# Patient Record
Sex: FEMALE | Race: WHITE | Employment: FULL TIME | URBAN - NONMETROPOLITAN AREA
[De-identification: names, ages, dates, MRNs, and addresses within clinical notes are randomized per-mention and may not be internally consistent; named-entity substitution may affect disease eponyms.]

---
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
## 2018-09-16 ENCOUNTER — HOSPITAL ENCOUNTER (EMERGENCY)
Facility: HOSPITAL | Age: 18
Discharge: HOME | End: 2018-09-16
Admitting: EMERGENCY MEDICINE

## 2018-09-16 VITALS
BODY MASS INDEX: 32.1 KG/M2 | HEIGHT: 64 IN | SYSTOLIC BLOOD PRESSURE: 126 MMHG | TEMPERATURE: 98.9 F | WEIGHT: 188 LBS | DIASTOLIC BLOOD PRESSURE: 93 MMHG | OXYGEN SATURATION: 99 % | HEART RATE: 82 BPM | RESPIRATION RATE: 18 BRPM

## 2018-09-16 DIAGNOSIS — H60.392 OTHER INFECTIVE ACUTE OTITIS EXTERNA OF LEFT EAR: Primary | ICD-10-CM

## 2018-09-16 PROCEDURE — 99283 EMERGENCY DEPT VISIT LOW MDM: CPT

## 2018-09-16 RX ORDER — IBUPROFEN 800 MG/1
800 TABLET ORAL ONCE
Status: COMPLETED | OUTPATIENT
Start: 2018-09-16 | End: 2018-09-16

## 2018-09-16 RX ORDER — SULFAMETHOXAZOLE AND TRIMETHOPRIM 800; 160 MG/1; MG/1
1 TABLET ORAL 2 TIMES DAILY
Qty: 20 TABLET | Refills: 0 | Status: SHIPPED | OUTPATIENT
Start: 2018-09-16 | End: 2018-09-26

## 2018-09-16 RX ORDER — TRAMADOL HYDROCHLORIDE 50 MG/1
50 TABLET ORAL ONCE
Status: COMPLETED | OUTPATIENT
Start: 2018-09-16 | End: 2018-09-16

## 2018-09-16 RX ORDER — TRAMADOL HYDROCHLORIDE 50 MG/1
50 TABLET ORAL EVERY 8 HOURS PRN
Qty: 4 TABLET | Refills: 0 | Status: SHIPPED | OUTPATIENT
Start: 2018-09-16 | End: 2022-10-12 | Stop reason: SDUPTHER

## 2018-09-16 RX ORDER — SERTRALINE HYDROCHLORIDE 100 MG/1
100 TABLET, FILM COATED ORAL DAILY
COMMUNITY
End: 2022-12-29

## 2018-09-16 RX ORDER — CIPROFLOXACIN AND DEXAMETHASONE 3; 1 MG/ML; MG/ML
4 SUSPENSION/ DROPS AURICULAR (OTIC) 2 TIMES DAILY
Status: DISCONTINUED | OUTPATIENT
Start: 2018-09-16 | End: 2018-09-16 | Stop reason: HOSPADM

## 2018-09-16 RX ORDER — IBUPROFEN 800 MG/1
800 TABLET ORAL EVERY 8 HOURS PRN
Qty: 30 TABLET | Refills: 0 | Status: SHIPPED | OUTPATIENT
Start: 2018-09-16 | End: 2022-12-29

## 2018-09-16 RX ADMIN — IBUPROFEN 800 MG: 800 TABLET ORAL at 13:12

## 2018-09-16 RX ADMIN — TRAMADOL HYDROCHLORIDE 50 MG: 50 TABLET, COATED ORAL at 13:12

## 2018-09-16 RX ADMIN — CIPROFLOXACIN AND DEXAMETHASONE 4 DROP: 3; 1 SUSPENSION/ DROPS AURICULAR (OTIC) at 13:15

## 2018-09-16 NOTE — DISCHARGE INSTRUCTIONS
Continue to monitor the area, he should see improvement within the next 2448 hrs. after starting antibiotics.  If you continue to have symptoms or if there is a worsening of symptoms follow-up sooner with primary care provider.  Increase your fluid intake, take ibuprofen with food, you may use Ultram as needed for breakthrough pain at nighttime

## 2018-09-16 NOTE — ED PROVIDER NOTES
"Subjective   Mrs. Lloyd is a 18-year-old female patient who presents today for complaints of an acute on set of left ear pain and appearance of lymph nodes over this past week.  Patient states that she has recently got over a dental infection of the left lower molars, having taken amoxicillin.  She states that she began having some swelling to a ear piercing located just outside of the ear canal approximately 2 weeks ago.  She states that she has removed the jewelry.  She noticed that she had a \"knot\" behind her ear last week and saw her primary care provider.  She also had upper risk for infections that she was placed on Zithromax at that time.  She subsequently underwent ultrasound of the area and it was believed to be reactive lymph node.  She states that over this weekend the pain of her left ear has increased significantly in the ear canal itself is now swollen.  She also notices several other not she believes are lymph nodes as well around the left ear.  She denies any known fevers/chills, nausea/vomiting, sore throat and nasal congestion difficulty swallowing.  Patient states that her primary care provider did not start her on antibiotics last week because she feels that her prior amoxicillin and Zithromax would have cleared her from any skin or ear infection.        History provided by:  Patient   used: No        Review of Systems   Constitutional: Negative for chills and fever.   HENT: Positive for ear pain. Negative for congestion, rhinorrhea, sore throat and trouble swallowing.    Eyes: Negative.  Negative for visual disturbance.   Respiratory: Negative.  Negative for cough, chest tightness and shortness of breath.    Cardiovascular: Negative.  Negative for chest pain and palpitations.   Gastrointestinal: Negative for abdominal pain, diarrhea, nausea and vomiting.   Endocrine: Negative.    Genitourinary: Negative for decreased urine volume.   Musculoskeletal: Negative for back pain " "and neck stiffness.   Skin: Negative for wound.   Allergic/Immunologic: Negative.    Neurological: Negative.  Negative for dizziness, weakness and headaches.   Hematological: Negative.    Psychiatric/Behavioral: Negative.        Past Medical History:   Diagnosis Date   • Abdominal pain     r/t gallbladder   • Acid reflux    • Asthma    • Migraines    • Nausea    • Sinusitis        Allergies   Allergen Reactions   • Hydrocodone Itching       Past Surgical History:   Procedure Laterality Date   • CHOLECYSTECTOMY     • CHOLECYSTECTOMY WITH INTRAOPERATIVE CHOLANGIOGRAM N/A 1/20/2017    Procedure: CHOLECYSTECTOMY, LAPAROSCOPIC;  Surgeon: Lo De La Torre MD;  Location: [facility];  Service:        History reviewed. No pertinent family history.    Social History     Social History   • Marital status: Single     Social History Main Topics   • Smoking status: Never Smoker   • Smokeless tobacco: Never Used   • Alcohol use No   • Drug use: No   • Sexual activity: No     Other Topics Concern   • Not on file       /93 (BP Location: Right arm, Patient Position: Sitting)   Pulse 82   Temp 98.9 °F (37.2 °C) (Temporal Artery )   Resp 18   Ht 162.6 cm (64\")   Wt 85.3 kg (188 lb)   SpO2 99%   BMI 32.27 kg/m²       Objective   Physical Exam   Constitutional: She is oriented to person, place, and time. She appears well-developed and well-nourished. No distress.   HENT:   Head: Normocephalic and atraumatic.   Right Ear: External ear normal.   Left Ear: External ear normal. There is tenderness. No drainage. No foreign bodies.   Nose: Nose normal.   Mouth/Throat: Oropharynx is clear and moist.   There is moderate canal edema noted as well as swelling and some redness to the area of piercing just outside of the ear canal.  There is tenderness around this area as well.  They are are pre-and postauricular nodes that are tender to palpation.  There is also some tender left cervical nodes as well   Eyes: Pupils are equal, round, and " reactive to light. EOM are normal.   Neck: Normal range of motion. Neck supple.   Cardiovascular: Normal rate and regular rhythm.    Pulmonary/Chest: Effort normal and breath sounds normal.   Abdominal: Soft. Bowel sounds are normal. There is no tenderness. There is no rebound and no guarding.   Musculoskeletal: Normal range of motion.   Neurological: She is alert and oriented to person, place, and time.   Skin: Skin is warm and dry.   Psychiatric: She has a normal mood and affect.   Nursing note and vitals reviewed.      Procedures           ED Course  ED Course as of Sep 16 1422   Sun Sep 16, 2018   1300 Discussed that I believe that the piercing has caused a infection type reaction to the ear canal.  I recommend that we start a antibiotic it is more tailored to skin and soft tissue infection as well as eardrops for external otitis media.  Patient mother are in agreement.  We will go ahead and start eardrops here and have patient follow up with her primary care provider for reevaluation.  For any new or worsening symptoms May return to emergency room.  [BA]      ED Course User Index  [BA] Moriah Helms, KRISTEN                  [facility]      Final diagnoses:   Other infective acute otitis externa of left ear            Moriah Helms, KRISTEN  09/16/18 142

## 2018-11-05 ENCOUNTER — OFFICE VISIT (OUTPATIENT)
Dept: PRIMARY CARE CLINIC | Age: 18
End: 2018-11-05
Payer: COMMERCIAL

## 2018-11-05 VITALS
TEMPERATURE: 97.5 F | OXYGEN SATURATION: 98 % | DIASTOLIC BLOOD PRESSURE: 82 MMHG | SYSTOLIC BLOOD PRESSURE: 123 MMHG | BODY MASS INDEX: 31.86 KG/M2 | HEIGHT: 64 IN | RESPIRATION RATE: 18 BRPM | HEART RATE: 84 BPM | WEIGHT: 186.6 LBS

## 2018-11-05 DIAGNOSIS — N93.8 DUB (DYSFUNCTIONAL UTERINE BLEEDING): ICD-10-CM

## 2018-11-05 DIAGNOSIS — H92.01 RIGHT EAR PAIN: Primary | ICD-10-CM

## 2018-11-05 DIAGNOSIS — J45.901 MODERATE ASTHMA WITH EXACERBATION, UNSPECIFIED WHETHER PERSISTENT: ICD-10-CM

## 2018-11-05 DIAGNOSIS — F51.04 CHRONIC INSOMNIA: ICD-10-CM

## 2018-11-05 DIAGNOSIS — F41.8 DEPRESSION WITH ANXIETY: ICD-10-CM

## 2018-11-05 PROCEDURE — G8417 CALC BMI ABV UP PARAM F/U: HCPCS | Performed by: FAMILY MEDICINE

## 2018-11-05 PROCEDURE — 1036F TOBACCO NON-USER: CPT | Performed by: FAMILY MEDICINE

## 2018-11-05 PROCEDURE — 99214 OFFICE O/P EST MOD 30 MIN: CPT | Performed by: FAMILY MEDICINE

## 2018-11-05 PROCEDURE — G8484 FLU IMMUNIZE NO ADMIN: HCPCS | Performed by: FAMILY MEDICINE

## 2018-11-05 PROCEDURE — G8427 DOCREV CUR MEDS BY ELIG CLIN: HCPCS | Performed by: FAMILY MEDICINE

## 2018-11-05 RX ORDER — PREDNISONE 20 MG/1
TABLET ORAL
Qty: 7 TABLET | Refills: 0 | Status: SHIPPED | OUTPATIENT
Start: 2018-11-05 | End: 2019-01-04

## 2018-11-05 ASSESSMENT — PATIENT HEALTH QUESTIONNAIRE - PHQ9
SUM OF ALL RESPONSES TO PHQ QUESTIONS 1-9: 1
2. FEELING DOWN, DEPRESSED OR HOPELESS: 1
SUM OF ALL RESPONSES TO PHQ9 QUESTIONS 1 & 2: 1
SUM OF ALL RESPONSES TO PHQ QUESTIONS 1-9: 1
1. LITTLE INTEREST OR PLEASURE IN DOING THINGS: 0

## 2018-11-05 NOTE — PATIENT INSTRUCTIONS
in a peaceful, pleasant scene. Focus on the details and feelings of being in a place that is relaxing. · Get up and do a quiet or boring activity until you feel sleepy. · Don't drink any liquids after 6 p.m. if you wake up often because you have to go to the bathroom. Where can you learn more? Go to [email or web address] org and sign in to your [email or web address]. account. Enter Y031 in the Contract Cloud box to learn more about \"Learning About Sleeping Well. \"     If you do not have an account, please click on the \"Sign Up Now\" link. Current as of: December 7, 2017  Content Version: 11.7  © 8554-7655 [employer]. Care instructions adapted under license by [facility] ([facility]). If you have questions about a medical condition or this instruction, always ask your healthcare professional. [address] any warranty or liability for your use of this information.

## 2018-11-06 ENCOUNTER — NURSE ONLY (OUTPATIENT)
Dept: PRIMARY CARE CLINIC | Age: 18
End: 2018-11-06
Payer: COMMERCIAL

## 2018-11-06 DIAGNOSIS — N92.6 IRREGULAR MENSES: Primary | ICD-10-CM

## 2018-11-06 DIAGNOSIS — G43.009 MIGRAINE WITHOUT AURA AND WITHOUT STATUS MIGRAINOSUS, NOT INTRACTABLE: ICD-10-CM

## 2018-11-06 LAB
ALBUMIN SERPL-MCNC: 4.1 G/DL (ref 3.5–5.2)
ALP BLD-CCNC: 83 U/L (ref 35–104)
ALT SERPL-CCNC: 21 U/L (ref 5–33)
ANION GAP SERPL CALCULATED.3IONS-SCNC: 17 MMOL/L (ref 7–19)
AST SERPL-CCNC: 19 U/L (ref 5–32)
BILIRUB SERPL-MCNC: 0.3 MG/DL (ref 0.2–1.2)
BUN BLDV-MCNC: 8 MG/DL (ref 6–20)
CALCIUM SERPL-MCNC: 9.9 MG/DL (ref 8.6–10)
CHLORIDE BLD-SCNC: 104 MMOL/L (ref 98–111)
CO2: 20 MMOL/L (ref 22–29)
CREAT SERPL-MCNC: 0.6 MG/DL (ref 0.5–0.9)
GFR NON-AFRICAN AMERICAN: >60
GLUCOSE BLD-MCNC: 104 MG/DL (ref 74–109)
HCT VFR BLD CALC: 45.5 % (ref 37–47)
HEMOGLOBIN: 14.5 G/DL (ref 12–16)
MCH RBC QN AUTO: 30.7 PG (ref 27–31)
MCHC RBC AUTO-ENTMCNC: 31.9 G/DL (ref 33–37)
MCV RBC AUTO: 96.4 FL (ref 81–99)
PDW BLD-RTO: 12.7 % (ref 11.5–14.5)
PLATELET # BLD: 317 K/UL (ref 130–400)
PMV BLD AUTO: 9.3 FL (ref 9.4–12.3)
POTASSIUM SERPL-SCNC: 4.2 MMOL/L (ref 3.5–5)
RBC # BLD: 4.72 M/UL (ref 4.2–5.4)
SODIUM BLD-SCNC: 141 MMOL/L (ref 136–145)
TOTAL PROTEIN: 7.4 G/DL (ref 6.6–8.7)
TSH SERPL DL<=0.05 MIU/L-ACNC: 0.74 UIU/ML (ref 0.27–4.2)
WBC # BLD: 11.6 K/UL (ref 4.8–10.8)

## 2018-11-06 PROCEDURE — 36415 COLL VENOUS BLD VENIPUNCTURE: CPT | Performed by: NURSE PRACTITIONER

## 2018-11-18 ASSESSMENT — ENCOUNTER SYMPTOMS
TROUBLE SWALLOWING: 0
SINUS PAIN: 0
RESPIRATORY NEGATIVE: 1
GASTROINTESTINAL NEGATIVE: 1
RHINORRHEA: 0
SINUS PRESSURE: 0

## 2018-11-19 ENCOUNTER — TELEPHONE (OUTPATIENT)
Dept: PRIMARY CARE CLINIC | Age: 18
End: 2018-11-19

## 2018-11-19 NOTE — PROGRESS NOTES
behavior is normal. Judgment and thought content normal. Cognition and memory are normal.   Vitals reviewed. Assessment:      1. Right ear pain    2. DUB (dysfunctional uterine bleeding)    3. Moderate asthma with exacerbation, unspecified whether persistent    4. Depression with anxiety    5. Chronic insomnia            Plan:      MEDICATIONS:  Orders Placed This Encounter   Medications    predniSONE (DELTASONE) 20 MG tablet     Si tablet by mouth once a day for 7 days     Dispense:  7 tablet     Refill:  0   She has albuterol nebulizer solution at home. ORDERS:  No orders of the defined types were placed in this encounter. I feel that she is having a mild asthma flareup. We will treat with prednisone. I feel like she needs to see a counselor. I would like her to be in counseling before we start more medication. At this time I do not see anything in her ear. I really don't detect any enlarged lymph nodes. She could've had a virus. If these things recur she is to let me know. we talked about ways to improve her sleep. I am not a fan of sleeping pills in young people. We could consider Remeron though. Follow-up:  Return in about 5 weeks (around 12/10/2018) for recheck. PATIENT INSTRUCTIONS:  Patient Instructions       Patient Education        Asthma Attack: Care Instructions  Your Care Instructions    During an asthma attack, the airways swell and narrow. This makes it hard to breathe. Severe asthma attacks can be life-threatening, but you can help prevent them by keeping your asthma under control and treating symptoms before they get bad. Symptoms include being short of breath, having chest tightness, coughing, and wheezing. Noting and treating these symptoms can also help you avoid future trips to the emergency room. The doctor has checked you carefully, but problems can develop later. If you notice any problems or new symptoms, get medical treatment right away.   Follow-up care is a key part of your treatment and safety. Be sure to make and go to all appointments, and call your doctor if you are having problems. It's also a good idea to know your test results and keep a list of the medicines you take. How can you care for yourself at home? · Follow your asthma action plan to prevent and treat attacks. If you don't have an asthma action plan, work with your doctor to create one. · Take your asthma medicines exactly as prescribed. Talk to your doctor right away if you have any questions about how to take them. ¨ Use your quick-relief medicine when you have symptoms of an attack. Quick-relief medicine is usually an albuterol inhaler. Some people need to use quick-relief medicine before they exercise. ¨ Take your controller medicine every day, not just when you have symptoms. Controller medicine is usually an inhaled corticosteroid. The goal is to prevent problems before they occur. Don't use your controller medicine to treat an attack that has already started. It doesn't work fast enough to help. ¨ If your doctor prescribed corticosteroid pills to use during an attack, take them exactly as prescribed. It may take hours for the pills to work, but they may make the episode shorter and help you breathe better. ¨ Keep your quick-relief medicine with you at all times. · Talk to your doctor before using other medicines. Some medicines, such as aspirin, can cause asthma attacks in some people. · If you have a peak flow meter, use it to check how well you are breathing. This can help you predict when an asthma attack is going to occur. Then you can take medicine to prevent the asthma attack or make it less severe. · Do not smoke or allow others to smoke around you. Avoid smoky places. Smoking makes asthma worse. If you need help quitting, talk to your doctor about stop-smoking programs and medicines. These can increase your chances of quitting for good.   · Learn what triggers an asthma attack

## 2018-11-19 NOTE — TELEPHONE ENCOUNTER
----- Message from Joelle Valencia MD sent at 11/18/2018 11:09 PM CST -----  Please put this in a phone note. Call mother. I'm afraid that Luis is going to fall through the cracks. Who is she seeing for counseling? I was afraid with her insurance the only people that she could see would be [facility]. I started her on prednisone for her wheezing. Please ask how that is doing. As far as I can tell she is not on any medicine for anxiety and depression.

## 2018-11-26 NOTE — TELEPHONE ENCOUNTER
PT has not had time to go to counseling. States that she is going to call today. PT also states that her wheezing is better and correct she is not taking any medication for the depression and anxiety.

## 2019-01-04 ENCOUNTER — OFFICE VISIT (OUTPATIENT)
Dept: PRIMARY CARE CLINIC | Age: 19
End: 2019-01-04
Payer: COMMERCIAL

## 2019-01-04 VITALS
WEIGHT: 182 LBS | HEIGHT: 64 IN | RESPIRATION RATE: 16 BRPM | HEART RATE: 80 BPM | SYSTOLIC BLOOD PRESSURE: 116 MMHG | DIASTOLIC BLOOD PRESSURE: 86 MMHG | BODY MASS INDEX: 31.07 KG/M2 | TEMPERATURE: 97.3 F

## 2019-01-04 DIAGNOSIS — F41.8 DEPRESSION WITH ANXIETY: Primary | ICD-10-CM

## 2019-01-04 DIAGNOSIS — N94.6 DYSMENORRHEA: ICD-10-CM

## 2019-01-04 PROCEDURE — G8417 CALC BMI ABV UP PARAM F/U: HCPCS | Performed by: FAMILY MEDICINE

## 2019-01-04 PROCEDURE — 1036F TOBACCO NON-USER: CPT | Performed by: FAMILY MEDICINE

## 2019-01-04 PROCEDURE — G8427 DOCREV CUR MEDS BY ELIG CLIN: HCPCS | Performed by: FAMILY MEDICINE

## 2019-01-04 PROCEDURE — 99213 OFFICE O/P EST LOW 20 MIN: CPT | Performed by: FAMILY MEDICINE

## 2019-01-04 PROCEDURE — G8484 FLU IMMUNIZE NO ADMIN: HCPCS | Performed by: FAMILY MEDICINE

## 2019-01-04 RX ORDER — VENLAFAXINE HYDROCHLORIDE 75 MG/1
CAPSULE, EXTENDED RELEASE ORAL
Qty: 60 CAPSULE | Refills: 3 | Status: SHIPPED | OUTPATIENT
Start: 2019-01-04 | End: 2019-10-11

## 2019-01-04 RX ORDER — SUMATRIPTAN 50 MG/1
TABLET, FILM COATED ORAL
Qty: 9 TABLET | Refills: 2 | Status: SHIPPED | OUTPATIENT
Start: 2019-01-04 | End: 2021-09-29

## 2019-01-04 RX ORDER — NORGESTIMATE AND ETHINYL ESTRADIOL 0.25-0.035
KIT ORAL
Qty: 3 PACKET | Refills: 3 | Status: SHIPPED | OUTPATIENT
Start: 2019-01-04 | End: 2019-10-11

## 2019-01-05 ASSESSMENT — ENCOUNTER SYMPTOMS: RESPIRATORY NEGATIVE: 1

## 2019-01-10 ENCOUNTER — TELEPHONE (OUTPATIENT)
Dept: PRIMARY CARE CLINIC | Age: 19
End: 2019-01-10

## 2019-01-11 RX ORDER — ESCITALOPRAM OXALATE 10 MG/1
10 TABLET ORAL DAILY
Qty: 30 TABLET | Refills: 3 | Status: SHIPPED | OUTPATIENT
Start: 2019-01-11 | End: 2021-09-29

## 2019-02-27 ENCOUNTER — OFFICE VISIT (OUTPATIENT)
Dept: PRIMARY CARE CLINIC | Age: 19
End: 2019-02-27
Payer: COMMERCIAL

## 2019-02-27 VITALS
HEART RATE: 82 BPM | SYSTOLIC BLOOD PRESSURE: 100 MMHG | BODY MASS INDEX: 34.19 KG/M2 | DIASTOLIC BLOOD PRESSURE: 80 MMHG | WEIGHT: 185.8 LBS | OXYGEN SATURATION: 99 % | RESPIRATION RATE: 18 BRPM | HEIGHT: 62 IN | TEMPERATURE: 97.3 F

## 2019-02-27 DIAGNOSIS — K29.00 ACUTE GASTRITIS WITHOUT HEMORRHAGE, UNSPECIFIED GASTRITIS TYPE: ICD-10-CM

## 2019-02-27 DIAGNOSIS — F33.42 RECURRENT MAJOR DEPRESSIVE DISORDER, IN FULL REMISSION (HCC): ICD-10-CM

## 2019-02-27 DIAGNOSIS — J02.9 PHARYNGITIS, UNSPECIFIED ETIOLOGY: Primary | ICD-10-CM

## 2019-02-27 LAB — S PYO AG THROAT QL: NORMAL

## 2019-02-27 PROCEDURE — 87880 STREP A ASSAY W/OPTIC: CPT | Performed by: FAMILY MEDICINE

## 2019-02-27 PROCEDURE — 1036F TOBACCO NON-USER: CPT | Performed by: FAMILY MEDICINE

## 2019-02-27 PROCEDURE — G8427 DOCREV CUR MEDS BY ELIG CLIN: HCPCS | Performed by: FAMILY MEDICINE

## 2019-02-27 PROCEDURE — G8484 FLU IMMUNIZE NO ADMIN: HCPCS | Performed by: FAMILY MEDICINE

## 2019-02-27 PROCEDURE — 99214 OFFICE O/P EST MOD 30 MIN: CPT | Performed by: FAMILY MEDICINE

## 2019-02-27 PROCEDURE — G8417 CALC BMI ABV UP PARAM F/U: HCPCS | Performed by: FAMILY MEDICINE

## 2019-02-27 RX ORDER — PANTOPRAZOLE SODIUM 40 MG/1
1 TABLET, DELAYED RELEASE ORAL DAILY
COMMUNITY
Start: 2019-02-06 | End: 2021-09-29

## 2019-02-27 RX ORDER — SUCRALFATE 1 G/1
1 TABLET ORAL 3 TIMES DAILY
COMMUNITY
Start: 2019-02-06 | End: 2019-10-11

## 2019-02-27 RX ORDER — DICYCLOMINE HYDROCHLORIDE 10 MG/1
1 CAPSULE ORAL 3 TIMES DAILY
COMMUNITY
Start: 2019-02-06 | End: 2019-10-11

## 2019-02-27 ASSESSMENT — PATIENT HEALTH QUESTIONNAIRE - PHQ9
SUM OF ALL RESPONSES TO PHQ QUESTIONS 1-9: 0
1. LITTLE INTEREST OR PLEASURE IN DOING THINGS: 0
SUM OF ALL RESPONSES TO PHQ9 QUESTIONS 1 & 2: 0
SUM OF ALL RESPONSES TO PHQ QUESTIONS 1-9: 0
2. FEELING DOWN, DEPRESSED OR HOPELESS: 0

## 2019-02-28 ASSESSMENT — ENCOUNTER SYMPTOMS
RESPIRATORY NEGATIVE: 1
ABDOMINAL PAIN: 1

## 2019-10-11 ENCOUNTER — OFFICE VISIT (OUTPATIENT)
Dept: PRIMARY CARE CLINIC | Age: 19
End: 2019-10-11
Payer: COMMERCIAL

## 2019-10-11 VITALS
WEIGHT: 187.8 LBS | HEART RATE: 93 BPM | OXYGEN SATURATION: 98 % | TEMPERATURE: 98.4 F | RESPIRATION RATE: 18 BRPM | BODY MASS INDEX: 32.06 KG/M2 | HEIGHT: 64 IN

## 2019-10-11 DIAGNOSIS — Z01.419 WELL FEMALE EXAM WITH ROUTINE GYNECOLOGICAL EXAM: Primary | ICD-10-CM

## 2019-10-11 DIAGNOSIS — Z12.4 SCREENING FOR MALIGNANT NEOPLASM OF CERVIX: ICD-10-CM

## 2019-10-11 LAB
ALBUMIN SERPL-MCNC: 4.2 G/DL (ref 3.5–5.2)
ALP BLD-CCNC: 80 U/L (ref 35–104)
ALT SERPL-CCNC: 25 U/L (ref 5–33)
ANION GAP SERPL CALCULATED.3IONS-SCNC: 14 MMOL/L (ref 7–19)
AST SERPL-CCNC: 20 U/L (ref 5–32)
BASOPHILS ABSOLUTE: 0 K/UL (ref 0–0.2)
BASOPHILS RELATIVE PERCENT: 0.6 % (ref 0–1)
BILIRUB SERPL-MCNC: 0.4 MG/DL (ref 0.2–1.2)
BUN BLDV-MCNC: 9 MG/DL (ref 6–20)
CALCIUM SERPL-MCNC: 9.4 MG/DL (ref 8.6–10)
CHLORIDE BLD-SCNC: 105 MMOL/L (ref 98–111)
CO2: 21 MMOL/L (ref 22–29)
CREAT SERPL-MCNC: 0.7 MG/DL (ref 0.5–0.9)
EOSINOPHILS ABSOLUTE: 0.4 K/UL (ref 0–0.6)
EOSINOPHILS RELATIVE PERCENT: 4.8 % (ref 0–5)
GFR NON-AFRICAN AMERICAN: >60
GLUCOSE BLD-MCNC: 99 MG/DL (ref 74–109)
HCT VFR BLD CALC: 45.6 % (ref 37–47)
HEMOGLOBIN: 15.1 G/DL (ref 12–16)
IMMATURE GRANULOCYTES #: 0 K/UL
LYMPHOCYTES ABSOLUTE: 1.2 K/UL (ref 1.1–4.5)
LYMPHOCYTES RELATIVE PERCENT: 17 % (ref 20–40)
MCH RBC QN AUTO: 31 PG (ref 27–31)
MCHC RBC AUTO-ENTMCNC: 33.1 G/DL (ref 33–37)
MCV RBC AUTO: 93.6 FL (ref 81–99)
MONOCYTES ABSOLUTE: 0.6 K/UL (ref 0–0.9)
MONOCYTES RELATIVE PERCENT: 8.3 % (ref 0–10)
NEUTROPHILS ABSOLUTE: 5 K/UL (ref 1.5–7.5)
NEUTROPHILS RELATIVE PERCENT: 69 % (ref 50–65)
PDW BLD-RTO: 13.1 % (ref 11.5–14.5)
PLATELET # BLD: 317 K/UL (ref 130–400)
PMV BLD AUTO: 9.4 FL (ref 9.4–12.3)
POTASSIUM SERPL-SCNC: 3.4 MMOL/L (ref 3.5–5)
RBC # BLD: 4.87 M/UL (ref 4.2–5.4)
SODIUM BLD-SCNC: 140 MMOL/L (ref 136–145)
TOTAL PROTEIN: 7.7 G/DL (ref 6.6–8.7)
TSH SERPL DL<=0.05 MIU/L-ACNC: 1.76 UIU/ML (ref 0.27–4.2)
WBC # BLD: 7.2 K/UL (ref 4.8–10.8)

## 2019-10-11 PROCEDURE — 36415 COLL VENOUS BLD VENIPUNCTURE: CPT | Performed by: FAMILY MEDICINE

## 2019-10-11 PROCEDURE — G8484 FLU IMMUNIZE NO ADMIN: HCPCS | Performed by: FAMILY MEDICINE

## 2019-10-11 PROCEDURE — 99395 PREV VISIT EST AGE 18-39: CPT | Performed by: FAMILY MEDICINE

## 2019-10-11 RX ORDER — LEVONORGESTREL AND ETHINYL ESTRADIOL 0.1-0.02MG
KIT ORAL
Qty: 3 PACKET | Refills: 3 | Status: SHIPPED | OUTPATIENT
Start: 2019-10-11 | End: 2021-09-29

## 2020-09-29 ENCOUNTER — OFFICE VISIT (OUTPATIENT)
Dept: PRIMARY CARE CLINIC | Age: 20
End: 2020-09-29
Payer: MEDICAID

## 2020-09-29 VITALS
SYSTOLIC BLOOD PRESSURE: 130 MMHG | WEIGHT: 193 LBS | HEIGHT: 64 IN | OXYGEN SATURATION: 99 % | RESPIRATION RATE: 20 BRPM | DIASTOLIC BLOOD PRESSURE: 80 MMHG | HEART RATE: 101 BPM | TEMPERATURE: 98.5 F | BODY MASS INDEX: 32.95 KG/M2

## 2020-09-29 PROCEDURE — 99214 OFFICE O/P EST MOD 30 MIN: CPT | Performed by: NURSE PRACTITIONER

## 2020-09-29 RX ORDER — ALBUTEROL SULFATE 1.25 MG/3ML
1 SOLUTION RESPIRATORY (INHALATION) EVERY 6 HOURS PRN
Qty: 120 ML | Refills: 3 | Status: SHIPPED | OUTPATIENT
Start: 2020-09-29 | End: 2020-09-30 | Stop reason: SDUPTHER

## 2020-09-29 RX ORDER — MONTELUKAST SODIUM 10 MG/1
10 TABLET ORAL DAILY
Qty: 30 TABLET | Refills: 3 | Status: SHIPPED | OUTPATIENT
Start: 2020-09-29 | End: 2020-09-30 | Stop reason: SDUPTHER

## 2020-09-29 RX ORDER — LEVOFLOXACIN 500 MG/1
500 TABLET, FILM COATED ORAL DAILY
Qty: 7 TABLET | Refills: 0 | Status: SHIPPED | OUTPATIENT
Start: 2020-09-29 | End: 2020-09-30 | Stop reason: SDUPTHER

## 2020-09-29 RX ORDER — PREDNISONE 10 MG/1
10 TABLET ORAL 2 TIMES DAILY
Qty: 10 TABLET | Refills: 0 | Status: SHIPPED | OUTPATIENT
Start: 2020-09-29 | End: 2020-09-30 | Stop reason: SDUPTHER

## 2020-09-29 ASSESSMENT — ENCOUNTER SYMPTOMS
COUGH: 1
NAUSEA: 1
SHORTNESS OF BREATH: 1
WHEEZING: 1

## 2020-09-29 NOTE — LETTER
[place] [address] [address]  Phone: [phone]  Fax: [phone]    GLORIA Kent CNP        September 29, 2020     Patient: Colten Joshi   YOB: 2000   Date of Visit: 9/29/2020       To Whom It May Concern: It is my medical opinion that Wen Galvan should remain out of work until [employer] returns. If you have any questions or concerns, please don't hesitate to call.     Sincerely,        GLORIA Kent CNP

## 2020-09-29 NOTE — PROGRESS NOTES
[facility] PHYSICIAN SERVICES  [facility]  [address] [place] [address]  [address] [place] [address]  Dept: [phone]  Dept Fax: [phone]  Loc: [phone]    Fish Becerril is a 23 y.o. female who presents today for her medical conditions/complaints as noted below. Fish Becerril is c/o of Cough        HPI:     HPI   Chief Complaint   Patient presents with    Cough   patient works at day care. Fever and congestion last 2 days. Fatigue, body aches and chills. No loss taste or smell. Ear pain bilat. She does have history of asthma and thought at first flare up of asthma. Nebulizer not touching it, cough is deep. Short of breath with cough. Sometimes vomits with cough.    Past Medical History:   Diagnosis Date    Anxiety     Asthma     Gastritis       Past Surgical History:   Procedure Laterality Date    CHOLECYSTECTOMY  02/22/2017       Vitals 9/29/2020 10/11/2019 2/27/2019 1/4/2019 11/5/2018 9/8/3120   SYSTOLIC 758 - 594 485 228 391   DIASTOLIC 80 - 80 86 82 78   Site - - - Right Upper Arm Left Upper Arm Right Arm   Position - - - Sitting Sitting Sitting   Cuff Size - - - Large Adult Medium Adult Medium Adult   Pulse 101 93 82 80 84 84   Temp 98.5 98.4 97.3 97.3 97.5 98.2   Resp 20 18 18 16 18 20   SpO2 99 98 99 - 98 -   Weight 193 lb 187 lb 12.8 oz 185 lb 12.8 oz 182 lb 186 lb 9.6 oz 177 lb   Height 5' 4\" 5' 4\" 5' 2\" 5' 4\" 5' 4\" 5' 4\"   BMI (wt*703/ht~2) 33.12 kg/m2 32.23 kg/m2 33.98 kg/m2 31.24 kg/m2 32.03 kg/m2 30.38 kg/m2   Some recent data might be hidden       Family History   Problem Relation Age of Onset    Rheum Arthritis Mother     Depression Mother     Diabetes Paternal Grandmother     Colon Cancer Maternal Grandmother     Dementia Maternal Grandmother     Heart Disease Maternal Grandmother     Dementia Maternal Grandfather     Heart Disease Maternal Grandfather     Cancer Maternal Grandfather        Social History     Tobacco Use    Smoking status: Never Smoker    Smokeless tobacco: Never Used   Substance Use Topics    Alcohol use: No      Current Outpatient Medications   Medication Sig Dispense Refill    mometasone-formoterol (DULERA) 100-5 MCG/ACT inhaler Inhale 2 puffs into the lungs 2 times daily 1 Inhaler 0    montelukast (SINGULAIR) 10 MG tablet Take 1 tablet by mouth daily 30 tablet 3    levoFLOXacin (LEVAQUIN) 500 MG tablet Take 1 tablet by mouth daily for 7 days 7 tablet 0    predniSONE (DELTASONE) 10 MG tablet Take 1 tablet by mouth 2 times daily for 5 days 10 tablet 0    albuterol (ACCUNEB) 1.25 MG/3ML nebulizer solution Inhale 3 mLs into the lungs every 6 hours as needed for Wheezing 120 mL 3    levonorgestrel-ethinyl estradiol (AVIANE;ALESSE;LESSINA) 0.1-20 MG-MCG per tablet 1 tab po q day 3 packet 3    pantoprazole (PROTONIX) 40 MG tablet Take 1 tablet by mouth daily      escitalopram (LEXAPRO) 10 MG tablet Take 1 tablet by mouth daily 30 tablet 3    SUMAtriptan (IMITREX) 50 MG tablet TAKE ONE TABLET BY MOUTH ONCE AS NEEDED FOR  MIGRAINE  FOR  HEADACHE  NOT  RELIEVED  BY  TYLENOL  OR  MOTRIN. 9 tablet 2    ondansetron (ZOFRAN) 4 MG tablet Take 4 mg by mouth as needed       albuterol (PROVENTIL HFA;VENTOLIN HFA) 108 (90 BASE) MCG/ACT inhaler Inhale 2 puffs into the lungs every 6 hours as needed for Wheezing 1 Inhaler 3     No current facility-administered medications for this visit.       Allergies   Allergen Reactions    Hydrocodone Itching       Health Maintenance   Topic Date Due    Varicella vaccine (1 of 2 - 2-dose childhood series) 06/13/1999    HPV vaccine (1 - 2-dose series) 06/13/2009    HIV screen  06/13/2013    Chlamydia screen  01/14/2017    DTaP/Tdap/Td vaccine (1 - Tdap) 06/13/2017    Flu vaccine (1) 09/01/2020    Cervical cancer screen  10/16/2022    Hepatitis A vaccine  Aged Out    Hepatitis B vaccine  Aged Out    Hib vaccine  Aged Out    Meningococcal (ACWY) vaccine  Aged Out    Pneumococcal 0-64 years Vaccine  Aged Out       Subjective: Review of Systems   Constitutional: Positive for chills and fever. HENT: Positive for congestion and ear pain. Respiratory: Positive for cough, shortness of breath and wheezing. Cardiovascular: Negative. Gastrointestinal: Positive for nausea. Musculoskeletal: Positive for myalgias. Psychiatric/Behavioral: Negative. Objective:     Physical Exam  Vitals signs and nursing note reviewed. Constitutional:       Appearance: She is well-developed. HENT:      Head: Normocephalic. Right Ear: External ear normal. No middle ear effusion. Left Ear: External ear normal. A middle ear effusion is present. Tympanic membrane is erythematous. Nose: Congestion and rhinorrhea present. Right Sinus: No maxillary sinus tenderness. Left Sinus: No maxillary sinus tenderness. Mouth/Throat:      Lips: Pink. Eyes:      Pupils: Pupils are equal, round, and reactive to light. Neck:      Musculoskeletal: Normal range of motion. Cardiovascular:      Rate and Rhythm: Normal rate and regular rhythm. Heart sounds: Normal heart sounds. Pulmonary:      Effort: Respiratory distress present. Breath sounds: Normal breath sounds. No stridor. No wheezing, rhonchi or rales. Chest:      Chest wall: No tenderness. Skin:     General: Skin is warm and dry. Neurological:      Mental Status: She is alert and oriented to person, place, and time. Psychiatric:         Behavior: Behavior normal.         Thought Content: Thought content normal.         Judgment: Judgment normal.       /80   Pulse 101   Temp 98.5 °F (36.9 °C) (Temporal)   Resp 20   Ht 5' 4\" (1.626 m)   Wt 193 lb (87.5 kg)   SpO2 99%   BMI 33.13 kg/m²     Assessment:       Diagnosis Orders   1. Moderate persistent asthma with exacerbation  Culture, Throat   2.  Fever, unspecified fever cause  Culture, Throat         Plan:   More than 50% of the time was spent counseling and coordinating care for a total time of inadvertentlytranscribed.   Although I have reviewed the note for such errors, some may still exist.

## 2020-09-30 ENCOUNTER — TELEPHONE (OUTPATIENT)
Dept: PRIMARY CARE CLINIC | Age: 20
End: 2020-09-30

## 2020-09-30 RX ORDER — LEVOFLOXACIN 500 MG/1
500 TABLET, FILM COATED ORAL DAILY
Qty: 7 TABLET | Refills: 0 | Status: SHIPPED | OUTPATIENT
Start: 2020-09-30 | End: 2020-10-07

## 2020-09-30 RX ORDER — LEVOFLOXACIN 500 MG/1
500 TABLET, FILM COATED ORAL DAILY
Qty: 7 TABLET | Refills: 0 | Status: SHIPPED | OUTPATIENT
Start: 2020-09-30 | End: 2020-09-30 | Stop reason: SDUPTHER

## 2020-09-30 RX ORDER — PREDNISONE 10 MG/1
10 TABLET ORAL 2 TIMES DAILY
Qty: 10 TABLET | Refills: 0 | Status: SHIPPED | OUTPATIENT
Start: 2020-09-30 | End: 2020-09-30 | Stop reason: SDUPTHER

## 2020-09-30 RX ORDER — ALBUTEROL SULFATE 1.25 MG/3ML
1 SOLUTION RESPIRATORY (INHALATION) EVERY 6 HOURS PRN
Qty: 120 ML | Refills: 3 | Status: SHIPPED | OUTPATIENT
Start: 2020-09-30 | End: 2021-09-29

## 2020-09-30 RX ORDER — MONTELUKAST SODIUM 10 MG/1
10 TABLET ORAL DAILY
Qty: 30 TABLET | Refills: 3 | Status: SHIPPED | OUTPATIENT
Start: 2020-09-30

## 2020-09-30 RX ORDER — MONTELUKAST SODIUM 10 MG/1
10 TABLET ORAL DAILY
Qty: 30 TABLET | Refills: 3 | Status: SHIPPED | OUTPATIENT
Start: 2020-09-30 | End: 2020-09-30 | Stop reason: SDUPTHER

## 2020-09-30 RX ORDER — PREDNISONE 10 MG/1
10 TABLET ORAL 2 TIMES DAILY
Qty: 10 TABLET | Refills: 0 | Status: SHIPPED | OUTPATIENT
Start: 2020-09-30 | End: 2020-10-05

## 2020-09-30 NOTE — TELEPHONE ENCOUNTER
Joey in [facility] called stating Dylan Echevarria is not covered and note attached states to give Symbicort. Needs strength.

## 2020-10-01 RX ORDER — BUDESONIDE AND FORMOTEROL FUMARATE DIHYDRATE 80; 4.5 UG/1; UG/1
2 AEROSOL RESPIRATORY (INHALATION) 2 TIMES DAILY
Qty: 1 INHALER | Refills: 3 | Status: SHIPPED | OUTPATIENT
Start: 2020-10-01 | End: 2021-09-29

## 2020-12-09 ENCOUNTER — NURSE TRIAGE (OUTPATIENT)
Dept: OTHER | Facility: CLINIC | Age: 20
End: 2020-12-09

## 2020-12-09 NOTE — TELEPHONE ENCOUNTER
Patient's mother Romana Hernandez) on the phone. Covid Sx started about 2 weeks, was seen at an [facility] and was given abx, but no test. Body aches, runny nose, cough, vomiting, and shortness of breath today. Temp today is 98.9 this morning. Fevers off and on for 2 weeks. ED disposition given due to mother stating patient is worse, not getting any better, and patient have intermittent shortness of breath, chest pain, and hx of asthma. Reason for Disposition   Chest pain or pressure    Answer Assessment - Initial Assessment Questions  1. COVID-19 DIAGNOSIS: \"Who made your Coronavirus (COVID-19) diagnosis? \" \"Was it confirmed by a positive lab test?\" If not diagnosed by a HCP, ask \"Are there lots of cases (community spread) where you live? \" (See public health department website, if unsure)      Not tested. 2. COVID-19 EXPOSURE: \"Was there any known exposure to COVID before the symptoms began? \" CDC Definition of close contact: within 6 feet (2 meters) for a total of 15 minutes or more over a 24-hour period. Unsure    3. ONSET: \"When did the COVID-19 symptoms start? \"       2 weeks ago on and off.    4. WORST SYMPTOM: \"What is your worst symptom? \" (e.g., cough, fever, shortness of breath, muscle aches)      Body Aches    5. COUGH: \"Do you have a cough? \" If so, ask: \"How bad is the cough? \"        Pretty bad. 6. FEVER: \"Do you have a fever? \" If so, ask: \"What is your temperature, how was it measured, and when did it start? \"      None now but has been running low grade. 7. RESPIRATORY STATUS: \"Describe your breathing? \" (e.g., shortness of breath, wheezing, unable to speak)       Exertional shortness of breath. 8. BETTER-SAME-WORSE: \"Are you getting better, staying the same or getting worse compared to yesterday? \"  If getting worse, ask, \"In what way? \"      Worse    9. HIGH RISK DISEASE: \"Do you have any chronic medical problems? \" (e.g., asthma, heart or lung disease, weak immune system, obesity, etc.) Asthma    10. PREGNANCY: \"Is there any chance you are pregnant? \" \"When was your last menstrual period? \"        Denies    11. OTHER SYMPTOMS: \"Do you have any other symptoms? \"  (e.g., chills, fatigue, headache, loss of smell or taste, muscle pain, sore throat; new loss of smell or taste especially support the diagnosis of COVID-19)        See travel screen. Protocols used: CORONAVIRUS (JEDBS-49) DIAGNOSED OR SUSPECTED-ADULT-AH    Patient's mother called pre-service center [facility]) to schedule appointment, with red flag complaint, transferred to RN access for triage. See above questions and answers. Discussed disposition and patient and mother agreeable. Discussed potential consequences for not following disposition recommendation. Aware to call back with any concerns or persistent, worsening, or new symptoms develop. Attention Provider: Thank you for allowing me to participate in the care of your patient. The  patient was connected to triage in response to information provided to the ECC. Please do not respond through this encounter as the response is not directed to a shared pool.

## 2021-09-14 ENCOUNTER — PROCEDURE VISIT (OUTPATIENT)
Dept: ENT CLINIC | Age: 21
End: 2021-09-14
Payer: MEDICAID

## 2021-09-14 ENCOUNTER — OFFICE VISIT (OUTPATIENT)
Dept: ENT CLINIC | Age: 21
End: 2021-09-14
Payer: MEDICAID

## 2021-09-14 VITALS
WEIGHT: 198.2 LBS | HEART RATE: 92 BPM | OXYGEN SATURATION: 100 % | HEIGHT: 64 IN | DIASTOLIC BLOOD PRESSURE: 79 MMHG | SYSTOLIC BLOOD PRESSURE: 123 MMHG | BODY MASS INDEX: 33.84 KG/M2

## 2021-09-14 DIAGNOSIS — H65.91 RIGHT NON-SUPPURATIVE OTITIS MEDIA: ICD-10-CM

## 2021-09-14 DIAGNOSIS — H69.83 EUSTACHIAN TUBE DYSFUNCTION, BILATERAL: Primary | ICD-10-CM

## 2021-09-14 DIAGNOSIS — H92.03 OTALGIA OF BOTH EARS: Primary | ICD-10-CM

## 2021-09-14 DIAGNOSIS — H90.6 MIXED CONDUCTIVE AND SENSORINEURAL HEARING LOSS OF BOTH EARS: ICD-10-CM

## 2021-09-14 PROBLEM — H69.93 EUSTACHIAN TUBE DYSFUNCTION, BILATERAL: Status: ACTIVE | Noted: 2021-09-14

## 2021-09-14 PROCEDURE — 99203 OFFICE O/P NEW LOW 30 MIN: CPT | Performed by: PHYSICIAN ASSISTANT

## 2021-09-14 PROCEDURE — 92553 AUDIOMETRY AIR & BONE: CPT | Performed by: AUDIOLOGIST

## 2021-09-14 PROCEDURE — 92567 TYMPANOMETRY: CPT | Performed by: AUDIOLOGIST

## 2021-09-14 RX ORDER — FLUTICASONE PROPIONATE 50 MCG
2 SPRAY, SUSPENSION (ML) NASAL 2 TIMES DAILY
Qty: 16 G | Refills: 3 | Status: SHIPPED | OUTPATIENT
Start: 2021-09-14

## 2021-09-14 RX ORDER — METHYLPHENIDATE HYDROCHLORIDE 18 MG/1
TABLET ORAL EVERY MORNING
COMMUNITY

## 2021-09-14 RX ORDER — CEFUROXIME AXETIL 500 MG/1
500 TABLET ORAL 2 TIMES DAILY
Qty: 20 TABLET | Refills: 0 | Status: SHIPPED | OUTPATIENT
Start: 2021-09-14 | End: 2021-09-24

## 2021-09-14 RX ORDER — PSEUDOEPHEDRINE HYDROCHLORIDE 30 MG/1
30 TABLET ORAL 3 TIMES DAILY
Qty: 30 TABLET | Refills: 2 | Status: SHIPPED | OUTPATIENT
Start: 2021-09-14 | End: 2022-03-21 | Stop reason: ALTCHOICE

## 2021-09-14 RX ORDER — SERTRALINE HYDROCHLORIDE 100 MG/1
125 TABLET, FILM COATED ORAL DAILY
COMMUNITY

## 2021-09-14 RX ORDER — METHYLPREDNISOLONE 4 MG/1
TABLET ORAL
Qty: 1 KIT | Refills: 0 | Status: SHIPPED | OUTPATIENT
Start: 2021-09-14 | End: 2021-09-20

## 2021-09-14 ASSESSMENT — ENCOUNTER SYMPTOMS
SORE THROAT: 0
TROUBLE SWALLOWING: 0
SINUS PAIN: 0
RHINORRHEA: 0
EYE PAIN: 0
FACIAL SWELLING: 0
SINUS PRESSURE: 0
EYE DISCHARGE: 0
VOICE CHANGE: 0

## 2021-09-14 NOTE — ASSESSMENT & PLAN NOTE
Right otitis media  Plan: I will place the patient on Ceftin 500 mg twice a day for 10 days. We will reassess in 2 weeks.

## 2021-09-14 NOTE — PROGRESS NOTES
History   Jenny Atkins is a 19 y.o. female who presented to the clinic this date with complaints of bilateral ear pain and decreased hearing. She reported symptoms have been ongoing for about a year. She has been treated for multiple ear infections. Summary   Tympanometry consistent with normal TM mobility bilaterally. Pure tone testing indicates mild rising mixed hearing loss bilaterally. Results   Otoscopy:    Right: Clear EAC/Normal TM   Left: Clear EAC/Normal TM    Audiometry:    Right: Mild rising mixed hearing loss   Left: Mild rising mixed hearing loss         Tympanometry:     Right: Type A   Left: Type A    Plan   Results of today's testing were discussed with Ms. Vela and the following recommendations were made:    1. Follow up with ENT as scheduled.         Audiogram and Acoustic Immittance

## 2021-09-14 NOTE — ASSESSMENT & PLAN NOTE
Eustachian tube dysfunction bilaterally  Plan: I will place the patient on a Medrol Dosepak as well as Sudafed and Flonase. Patient is follow-up in 2 weeks for reevaluation.

## 2021-09-14 NOTE — PROGRESS NOTES
Ericka Hicks is a pleasant 49-year-old  female that was referred by Angela Waggoner due to problems with persistent ear pain. She reports that over the last year she has had constant problems with muffled hearing as well as dull achy pain from both ears. She reports that over the last month or 2 she has actually noticed a pulsatile tinnitus with the sound of this occurring under water. He denies any drainage from the ears and also denies any fever or chills. She has been treated with Claritin-D as well as Cefzil with no relief. Allergies: Hydrocodone      Current Outpatient Medications   Medication Sig Dispense Refill    sertraline (ZOLOFT) 100 MG tablet Take 100 mg by mouth daily      methylphenidate (CONCERTA) 18 MG extended release tablet Take by mouth every morning.       Loratadine-Pseudoephedrine (CLARITIN-D 24 HOUR PO) Take by mouth      methylPREDNISolone (MEDROL, TRUDY,) 4 MG tablet Take by mouth as directed 1 kit 0    pseudoephedrine (DECONGESTANT) 30 MG tablet Take 1 tablet by mouth three times daily 30 tablet 2    fluticasone (FLONASE) 50 MCG/ACT nasal spray 2 sprays by Each Nostril route 2 times daily 16 g 3    cefUROXime (CEFTIN) 500 MG tablet Take 1 tablet by mouth 2 times daily for 10 days 20 tablet 0    budesonide-formoterol (SYMBICORT) 80-4.5 MCG/ACT AERO Inhale 2 puffs into the lungs 2 times daily (Patient not taking: Reported on 9/14/2021) 1 Inhaler 3    mometasone-formoterol (DULERA) 100-5 MCG/ACT inhaler Inhale 2 puffs into the lungs 2 times daily (Patient not taking: Reported on 9/14/2021) 1 Inhaler 0    montelukast (SINGULAIR) 10 MG tablet Take 1 tablet by mouth daily 30 tablet 3    albuterol (ACCUNEB) 1.25 MG/3ML nebulizer solution Inhale 3 mLs into the lungs every 6 hours as needed for Wheezing 120 mL 3    levonorgestrel-ethinyl estradiol (AVIANE;ALESSE;LESSINA) 0.1-20 MG-MCG per tablet 1 tab po q day (Patient not taking: Reported on 9/14/2021) 3 packet 3    pantoprazole (PROTONIX) 40 MG tablet Take 1 tablet by mouth daily (Patient not taking: Reported on 9/14/2021)      escitalopram (LEXAPRO) 10 MG tablet Take 1 tablet by mouth daily (Patient not taking: Reported on 9/14/2021) 30 tablet 3    SUMAtriptan (IMITREX) 50 MG tablet TAKE ONE TABLET BY MOUTH ONCE AS NEEDED FOR  MIGRAINE  FOR  HEADACHE  NOT  RELIEVED  BY  TYLENOL  OR  MOTRIN. (Patient not taking: Reported on 9/14/2021) 9 tablet 2    ondansetron (ZOFRAN) 4 MG tablet Take 4 mg by mouth as needed       albuterol (PROVENTIL HFA;VENTOLIN HFA) 108 (90 BASE) MCG/ACT inhaler Inhale 2 puffs into the lungs every 6 hours as needed for Wheezing 1 Inhaler 3     No current facility-administered medications for this visit. Past Surgical History:   Procedure Laterality Date    CHOLECYSTECTOMY  02/22/2017       Past Medical History:   Diagnosis Date    Anxiety     Asthma     Gastritis        Family History   Problem Relation Age of Onset    Rheum Arthritis Mother     Depression Mother     Diabetes Paternal Grandmother     Colon Cancer Maternal Grandmother     Dementia Maternal Grandmother     Heart Disease Maternal Grandmother     Dementia Maternal Grandfather     Heart Disease Maternal Grandfather     Cancer Maternal Grandfather        Social History     Tobacco Use    Smoking status: Never Smoker    Smokeless tobacco: Never Used   Substance Use Topics    Alcohol use: No           REVIEW OF SYSTEMS:  all other systems reviewed and are negative  Review of Systems   Constitutional: Negative for chills and fever. HENT: Negative for congestion, dental problem, ear discharge, ear pain, facial swelling, hearing loss, nosebleeds, postnasal drip, rhinorrhea, sinus pressure, sinus pain, sneezing, sore throat, tinnitus, trouble swallowing and voice change. Eyes: Negative for pain, discharge and visual disturbance. Neurological: Negative for dizziness and headaches.            Comments: PHYSICAL EXAM:    /79   Pulse 92   Ht 5' 4\" (1.626 m)   Wt 198 lb 3.2 oz (89.9 kg)   SpO2 100%   BMI 34.02 kg/m²   Body mass index is 34.02 kg/m². General Appearance: well developed  and well nourished  Head/ Face: normocephalic and atraumatic  Vocal Quality: good/ normal  Ears: Right Ear: External: external ears normal Otoscopy Ear Canal: canal clear Otoscopy TM: TM's sluggish and TM's erythematous Left Ear: External: external ears normal Otoscopy Ear Canal: canal clear Otoscopy TM: TM's dull, TM's sluggish and TM's buldging  Hearing: Rinne A>B: Left, Rinne A>B: Right and Salazar L  Nose: septum midline and turbinates: engorged / congested  Neck: supple and adenopathy none palpable  Thyroid: normal and nodules No    Assessment & Plan:    Problem List Items Addressed This Visit     Eustachian tube dysfunction, bilateral - Primary     Eustachian tube dysfunction bilaterally  Plan: I will place the patient on a Medrol Dosepak as well as Sudafed and Flonase. Patient is follow-up in 2 weeks for reevaluation. Right non-suppurative otitis media     Right otitis media  Plan: I will place the patient on Ceftin 500 mg twice a day for 10 days. We will reassess in 2 weeks. Relevant Medications    cefUROXime (CEFTIN) 500 MG tablet          No orders of the defined types were placed in this encounter.       Orders Placed This Encounter   Medications    methylPREDNISolone (MEDROL, TRUDY,) 4 MG tablet     Sig: Take by mouth as directed     Dispense:  1 kit     Refill:  0    pseudoephedrine (DECONGESTANT) 30 MG tablet     Sig: Take 1 tablet by mouth three times daily     Dispense:  30 tablet     Refill:  2    fluticasone (FLONASE) 50 MCG/ACT nasal spray     Si sprays by Each Nostril route 2 times daily     Dispense:  16 g     Refill:  3    cefUROXime (CEFTIN) 500 MG tablet     Sig: Take 1 tablet by mouth 2 times daily for 10 days     Dispense:  20 tablet     Refill:  0       Electronically signed by Moe Rodney PA-C on 9/14/21 at 2:56 PM CDT          Please note that this chart was generated using dragon dictation software. Although every effort was made to ensure the accuracy of this automated transcription, some errors in transcription may have occurred.

## 2021-09-29 ENCOUNTER — OFFICE VISIT (OUTPATIENT)
Dept: ENT CLINIC | Age: 21
End: 2021-09-29
Payer: MEDICAID

## 2021-09-29 VITALS — WEIGHT: 198 LBS | TEMPERATURE: 98.6 F | BODY MASS INDEX: 33.8 KG/M2 | HEIGHT: 64 IN

## 2021-09-29 DIAGNOSIS — H69.83 EUSTACHIAN TUBE DYSFUNCTION, BILATERAL: Primary | ICD-10-CM

## 2021-09-29 PROCEDURE — 99213 OFFICE O/P EST LOW 20 MIN: CPT | Performed by: PHYSICIAN ASSISTANT

## 2021-09-29 RX ORDER — CLINDAMYCIN HYDROCHLORIDE 300 MG/1
300 CAPSULE ORAL 3 TIMES DAILY
Qty: 30 CAPSULE | Refills: 0 | Status: SHIPPED | OUTPATIENT
Start: 2021-09-29 | End: 2021-10-09

## 2021-09-29 NOTE — PROGRESS NOTES
Maralee Litten is a pleasant 18-year-old  female that presents for evaluation due to eustachian tube dysfunction bilaterally. She was initially noted to have evidence of an ear infection that was treated with Cefzil by her PCP. Unfortunately she developed a rash that required her to take Benadryl. She reports that she was also unable to take the Sudafed due to this triggering her vascular headaches. Currently she complains of otalgia to the left side with the right side being itchy. Physical examination with the microscope revealed the patient to have a dull TM to the left side with mild injection of the TM. The canal was noted be normal.  Mobility of the TM was noted to be normal.  Examination of the right ear demonstrated an injected TM with normal mobility. Neck exam demonstrated no lymphadenopathy to palpation. Impression: Resolved eustachian tube dysfunction bilaterally, persistent bilateral otitis media    Plan: I recommended treated the patient with clindamycin for 10 days. She is to follow-up in 2 weeks for reevaluation.       Electronically signed by Kirsty Fuentes PA-C on 9/29/21 at 3:35 PM CDT

## 2021-10-18 ENCOUNTER — OFFICE VISIT (OUTPATIENT)
Dept: ENT CLINIC | Age: 21
End: 2021-10-18
Payer: MEDICAID

## 2021-10-18 VITALS
BODY MASS INDEX: 33.46 KG/M2 | SYSTOLIC BLOOD PRESSURE: 110 MMHG | OXYGEN SATURATION: 98 % | HEART RATE: 95 BPM | WEIGHT: 196 LBS | DIASTOLIC BLOOD PRESSURE: 86 MMHG | HEIGHT: 64 IN

## 2021-10-18 DIAGNOSIS — H69.83 EUSTACHIAN TUBE DYSFUNCTION, BILATERAL: Primary | ICD-10-CM

## 2021-10-18 PROCEDURE — 99213 OFFICE O/P EST LOW 20 MIN: CPT | Performed by: PHYSICIAN ASSISTANT

## 2022-01-27 ENCOUNTER — OFFICE VISIT (OUTPATIENT)
Dept: ENT CLINIC | Age: 22
End: 2022-01-27
Payer: MEDICAID

## 2022-01-27 VITALS
SYSTOLIC BLOOD PRESSURE: 118 MMHG | BODY MASS INDEX: 33.46 KG/M2 | HEIGHT: 64 IN | WEIGHT: 196 LBS | DIASTOLIC BLOOD PRESSURE: 76 MMHG

## 2022-01-27 DIAGNOSIS — H65.91 RIGHT NON-SUPPURATIVE OTITIS MEDIA: Primary | ICD-10-CM

## 2022-01-27 PROCEDURE — 99214 OFFICE O/P EST MOD 30 MIN: CPT | Performed by: PHYSICIAN ASSISTANT

## 2022-01-27 RX ORDER — CLINDAMYCIN HYDROCHLORIDE 300 MG/1
300 CAPSULE ORAL 3 TIMES DAILY
Qty: 30 CAPSULE | Refills: 0 | Status: SHIPPED | OUTPATIENT
Start: 2022-01-27 | End: 2022-02-06

## 2022-01-27 RX ORDER — GLUCOSAMINE HCL 500 MG
TABLET ORAL
COMMUNITY

## 2022-01-27 NOTE — PROGRESS NOTES
Shawnee Manley is a pleasant 57-year-old  female that was referred by Dr. Vin Paredes due to problems with recurrent right ear pain. Patient has a history of eustachian tube dysfunction and has been previously treated in the past here. Currently she complains of right ear pain as well as constant pressure to this ear. She reports that her hearing is muffled and will alternate between the right and left ear. She admits to running a low-grade temperature of 100 with no chills or any additional problems. Physical examination with the microscope revealed the patient have an injected right TM with cloudy fluid noted behind the TM slightly consistent with otitis media. The canal was noted to be clear. The left ear demonstrated injected TM with no fluid noted behind the TM. Mobility was noted to be normal.  Neck exam demonstrated no lymphadenopathy or thyromegaly. Oral exam demonstrated normal posterior pharynx with no erythema or purulent material.      Impression: Bilateral otitis media    Plan: I will place the patient on clindamycin 300 mg by mouth 3 times a day for 10 days. The patient is to follow-up in 2 weeks for reevaluation. She was reminded to call if she has any questions or problems.       Electronically signed by Michelle Tracy PA-C on 1/27/22 at 3:19 PM CST

## 2022-02-10 ENCOUNTER — OFFICE VISIT (OUTPATIENT)
Dept: ENT CLINIC | Age: 22
End: 2022-02-10
Payer: MEDICAID

## 2022-02-10 VITALS
HEIGHT: 64 IN | DIASTOLIC BLOOD PRESSURE: 66 MMHG | BODY MASS INDEX: 33.46 KG/M2 | WEIGHT: 196 LBS | SYSTOLIC BLOOD PRESSURE: 114 MMHG

## 2022-02-10 DIAGNOSIS — H65.91 RIGHT NON-SUPPURATIVE OTITIS MEDIA: Primary | ICD-10-CM

## 2022-02-10 DIAGNOSIS — H65.91 MIDDLE EAR EFFUSION, RIGHT: ICD-10-CM

## 2022-02-10 PROBLEM — H69.93 EUSTACHIAN TUBE DYSFUNCTION, BILATERAL: Status: RESOLVED | Noted: 2021-09-14 | Resolved: 2022-02-10

## 2022-02-10 PROBLEM — H69.83 EUSTACHIAN TUBE DYSFUNCTION, BILATERAL: Status: RESOLVED | Noted: 2021-09-14 | Resolved: 2022-02-10

## 2022-02-10 PROCEDURE — 99213 OFFICE O/P EST LOW 20 MIN: CPT | Performed by: PHYSICIAN ASSISTANT

## 2022-02-10 RX ORDER — CIPROFLOXACIN 500 MG/1
500 TABLET, FILM COATED ORAL 2 TIMES DAILY
Qty: 20 TABLET | Refills: 0 | Status: SHIPPED | OUTPATIENT
Start: 2022-02-10 | End: 2022-02-20

## 2022-02-10 ASSESSMENT — ENCOUNTER SYMPTOMS
RHINORRHEA: 0
TROUBLE SWALLOWING: 0
EYE PAIN: 0
EYE DISCHARGE: 0
FACIAL SWELLING: 0
SINUS PRESSURE: 0
SORE THROAT: 0
VOICE CHANGE: 0
SINUS PAIN: 0

## 2022-02-10 NOTE — PROGRESS NOTES
Trent Dwyer is a pleasant 49-year-old  female that presents for a 2-week follow-up after treatment for right-sided otitis media and persistent middle ear effusion. Patient was placed on clindamycin for 10 days. Patient reports that she believes that her ear feels worse and continues with muffled hearing. She denies any vertigo or tinnitus. Allergies: Amoxicillin, Ceftin [cefuroxime], Hydrocodone, Hydrocodone-acetaminophen, and Azithromycin      Current Outpatient Medications   Medication Sig Dispense Refill    ciprofloxacin (CIPRO) 500 MG tablet Take 1 tablet by mouth 2 times daily for 10 days 20 tablet 0    Cholecalciferol (VITAMIN D3) 75 MCG (3000 UT) TABS Take by mouth      sertraline (ZOLOFT) 100 MG tablet Take 125 mg by mouth daily       methylphenidate (CONCERTA) 18 MG extended release tablet Take by mouth every morning.  pseudoephedrine (DECONGESTANT) 30 MG tablet Take 1 tablet by mouth three times daily 30 tablet 2    fluticasone (FLONASE) 50 MCG/ACT nasal spray 2 sprays by Each Nostril route 2 times daily 16 g 3    montelukast (SINGULAIR) 10 MG tablet Take 1 tablet by mouth daily 30 tablet 3    albuterol (PROVENTIL HFA;VENTOLIN HFA) 108 (90 BASE) MCG/ACT inhaler Inhale 2 puffs into the lungs every 6 hours as needed for Wheezing 1 Inhaler 3     No current facility-administered medications for this visit.        Past Surgical History:   Procedure Laterality Date    CHOLECYSTECTOMY  02/22/2017       Past Medical History:   Diagnosis Date    Anxiety     Asthma     Gastritis        Family History   Problem Relation Age of Onset    Rheum Arthritis Mother     Depression Mother     Diabetes Paternal Grandmother     Colon Cancer Maternal Grandmother     Dementia Maternal Grandmother     Heart Disease Maternal Grandmother     Dementia Maternal Grandfather     Heart Disease Maternal Grandfather     Cancer Maternal Grandfather        Social History Tobacco Use    Smoking status: Never Smoker    Smokeless tobacco: Never Used   Substance Use Topics    Alcohol use: No           REVIEW OF SYSTEMS:  all other systems reviewed and are negative  Review of Systems   Constitutional: Negative for chills and fever. HENT: Positive for congestion, ear discharge and ear pain. Negative for dental problem, facial swelling, hearing loss, nosebleeds, postnasal drip, rhinorrhea, sinus pressure, sinus pain, sneezing, sore throat, tinnitus, trouble swallowing and voice change. Eyes: Negative for pain and discharge. Neurological: Negative for dizziness and headaches. Comments:     PHYSICAL EXAM:    /66   Ht 5' 4\" (1.626 m)   Wt 196 lb (88.9 kg)   BMI 33.64 kg/m²   Body mass index is 33.64 kg/m². General Appearance: well developed  and well nourished  Head/ Face: normocephalic and atraumatic  Vocal Quality: good/ normal  Ears: Right Ear: External: external ears normal Otoscopy Ear Canal: canal clear Otoscopy TM: TM's buldging and TM's erythematous Left Ear: External: external ears normal Otoscopy Ear Canal: canal clear Otoscopy TM: TM's normal and TM's mobile  Hearing: grossly intact  Nose: nares normal and septum midline  Neck: supple and adenopathy none palpable  Thyroid: normal    Assessment & Plan:    Problem List Items Addressed This Visit     Middle ear effusion, right - Primary     Continuous right otitis media with middle ear effusion  Plan: Due to this persisting I will place her on 10 days of Cipro and have her to see Ni Romero and Dr. Chelsi Haji for consideration of a myringotomy. Patient has a history of having prior myringotomy tubes in the past.               No orders of the defined types were placed in this encounter.       Orders Placed This Encounter   Medications    ciprofloxacin (CIPRO) 500 MG tablet     Sig: Take 1 tablet by mouth 2 times daily for 10 days     Dispense:  20 tablet     Refill:  0       Electronically signed by Khushbu Mae Domo Beverly PA-C on 2/10/22 at 3:59 PM CST          Please note that this chart was generated using dragon dictation software. Although every effort was made to ensure the accuracy of this automated transcription, some errors in transcription may have occurred.

## 2022-02-24 ENCOUNTER — OFFICE VISIT (OUTPATIENT)
Dept: ENT CLINIC | Age: 22
End: 2022-02-24
Payer: MEDICAID

## 2022-02-24 ENCOUNTER — PROCEDURE VISIT (OUTPATIENT)
Dept: ENT CLINIC | Age: 22
End: 2022-02-24
Payer: MEDICAID

## 2022-02-24 VITALS — HEIGHT: 64 IN | WEIGHT: 196 LBS | BODY MASS INDEX: 33.46 KG/M2

## 2022-02-24 DIAGNOSIS — M26.623 BILATERAL TEMPOROMANDIBULAR JOINT PAIN: ICD-10-CM

## 2022-02-24 DIAGNOSIS — H65.91 MIDDLE EAR EFFUSION, RIGHT: Primary | ICD-10-CM

## 2022-02-24 DIAGNOSIS — H65.91 MIDDLE EAR EFFUSION, RIGHT: ICD-10-CM

## 2022-02-24 DIAGNOSIS — H92.03 OTALGIA OF BOTH EARS: ICD-10-CM

## 2022-02-24 DIAGNOSIS — H90.3 BILATERAL SENSORINEURAL HEARING LOSS: ICD-10-CM

## 2022-02-24 LAB — RHEUMATOID FACTOR: <10 IU/ML

## 2022-02-24 PROCEDURE — 92553 AUDIOMETRY AIR & BONE: CPT | Performed by: AUDIOLOGIST

## 2022-02-24 PROCEDURE — 99203 OFFICE O/P NEW LOW 30 MIN: CPT | Performed by: OTOLARYNGOLOGY

## 2022-02-24 PROCEDURE — 92567 TYMPANOMETRY: CPT | Performed by: AUDIOLOGIST

## 2022-02-24 RX ORDER — MOMETASONE FUROATE 1 MG/G
CREAM TOPICAL
Qty: 1 EACH | Refills: 2 | Status: SHIPPED | OUTPATIENT
Start: 2022-02-24

## 2022-02-24 NOTE — PROGRESS NOTES
History   Leanna Morales is a 19 y.o. female who presented to the clinic this date with continued complaints of right aural fullness. She is here today for possible PE tube placement. Summary   Tympanometry consistent with normal TM mobility bilaterally. Pure tone testing indicates mild eo moderate SNHL bilaterally. When compared to audiogram obtained in September, there was a slight (10 dB) drop in high frequency thresholds bilaterally. Based on degree of hearing loss, binaural hearing aids are recommended. Results   Otoscopy:    Right: Clear EAC/Normal TM   Left: Clear EAC/Normal TM    Audiometry:    Right: Mild to moderate sloping SNHL   Left: Mild to moderate sloping SNHL         Tympanometry:     Right: Type A   Left: Type A    Plan   Results of today's testing were discussed with Ms. Aidan Panchal and the following recommendations were made:    1. Follow up with ENT as scheduled. 2. Hearing aid evaluation as desired. 3. Monitor hearing yearly, sooner with changes. 4. Hearing protection as warranted.         Audiogram and Acoustic Immittance

## 2022-02-24 NOTE — ASSESSMENT & PLAN NOTE
Fairly symmetric mild sensorineural hearing loss with dip at 4000 cycle  Noise exposure deniedspecifically loud music and exposure to gunfire. Negative family history.     We will evaluate further with temporal bone CT to look for congenital pathology  We will obtain blood work to evaluate for autoimmune disease

## 2022-02-24 NOTE — ASSESSMENT & PLAN NOTE
Complains of chronic ear discomfort. This is most difficult to sort out in her as she has a number of contributing factors. She has a history of very poor dentition and recently had all of her maxillary teeth extracted. Her remaining mandibular teeth are in poor repair and she is likely going to have those extracted as well. Certainly at least some of her ear discomfort could be referred or related to her dental issues. She also was clearly tender at the TMJ although denied bruxism. Both ear canals were somewhat raw in appearance. She was devoid of wax as is often seen in eczematoid external otitis. Will treat with topical steroid  Currently there is no evidence of middle ear disease. Tympanograms are normal type A patterns and there is no conductive loss either on her audiogram or with a tuning fork.   At this point I would certainly not recommend a tube for her as often this worsens symptoms of aural fullness

## 2022-02-24 NOTE — ASSESSMENT & PLAN NOTE
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 She does wear a bite guard. I discussed with her that often it is difficult to determine whether ear pain is from a middle ear infection or arthritic inflammation related to her TMJ. Recent steroid prescription has given her relief and frankly this could have treated either middle ear disease or TMJ arthritis. For now would not recommend tubes as her ear is completely normal.  This is my first encounter with this nice young lady. I told her that should her ear symptoms recur that we would be happy to evaluate her.

## 2022-02-24 NOTE — PROGRESS NOTES
19 y.o.  female presents today with chronic ear discomfort. She had been seen and evaluated by Herberth Mackay and treated for middle ear disease. She has had multiple courses of antibiotics. Unfortunately she continues to complain of chronic pain and fullness in both ears. She has had abnormal hearing testing in the past.  She returns today for follow-up hearing testing and evaluation as a possible candidate for tube placement. Family History   Problem Relation Age of Onset    Rheum Arthritis Mother     Depression Mother     Diabetes Paternal Grandmother     Colon Cancer Maternal Grandmother     Dementia Maternal Grandmother     Heart Disease Maternal Grandmother     Dementia Maternal Grandfather     Heart Disease Maternal Grandfather     Cancer Maternal Grandfather      Social History     Socioeconomic History    Marital status: Single     Spouse name: Not on file    Number of children: Not on file    Years of education: Not on file    Highest education level: Not on file   Occupational History    Not on file   Tobacco Use    Smoking status: Never Smoker    Smokeless tobacco: Never Used   Substance and Sexual Activity    Alcohol use: No    Drug use: No    Sexual activity: Never   Other Topics Concern    Not on file   Social History Narrative    Not on file     Social Determinants of Health     Financial Resource Strain:     Difficulty of Paying Living Expenses: Not on file   Food Insecurity:     Worried About Running Out of Food in the Last Year: Not on file    Herminio of Food in the Last Year: Not on file   Transportation Needs:     Lack of Transportation (Medical): Not on file    Lack of Transportation (Non-Medical):  Not on file   Physical Activity:     Days of Exercise per Week: Not on file    Minutes of Exercise per Session: Not on file   Stress:     Feeling of Stress : Not on file   Social Connections:     Frequency of Communication with Friends and Family: Not on file    cranial nerves II- XII grossly intact  Psych/ Mood: cooperative and no depression, anxiety or agitation    Assessment & Plan:    Problem List Items Addressed This Visit        ENT Problems    Middle ear effusion, right     On today's exam her right middle ear space was completely clear   Normal tuning fork exam         Bilateral sensorineural hearing loss     Fairly symmetric mild sensorineural hearing loss with dip at 4000 cycle  Noise exposure deniedspecifically loud music and exposure to gunfire. Negative family history. We will evaluate further with temporal bone CT to look for congenital pathology  We will obtain blood work to evaluate for autoimmune disease         Relevant Orders    SYLVIA Screen with Reflex    Rheumatoid Factor    CT IAC POSTERIOR FOSSA WO CONTRAST       Other    Bilateral temporomandibular joint pain     Tender over TMJ. Denies bruxism. Recently had maxillary dentition extracted apparently because of caries. Multiple maxillary caries and broken crowns. Scheduled to have additional treatment upcoming. I think that TMJ arthralgia is a factor in her symptoms. She has no evidence of middle ear disease on today's visit. Otalgia of both ears     Complains of chronic ear discomfort. This is most difficult to sort out in her as she has a number of contributing factors. She has a history of very poor dentition and recently had all of her maxillary teeth extracted. Her remaining mandibular teeth are in poor repair and she is likely going to have those extracted as well. Certainly at least some of her ear discomfort could be referred or related to her dental issues. She also was clearly tender at the TMJ although denied bruxism. Both ear canals were somewhat raw in appearance. She was devoid of wax as is often seen in eczematoid external otitis. Will treat with topical steroid  Currently there is no evidence of middle ear disease.   Tympanograms are normal type A patterns and there is no conductive loss either on her audiogram or with a tuning fork. At this point I would certainly not recommend a tube for her as often this worsens symptoms of aural fullness         Relevant Orders    CT IAC POSTERIOR FOSSA WO CONTRAST          Orders Placed This Encounter   Procedures    CT IAC POSTERIOR FOSSA WO CONTRAST     Standing Status:   Future     Standing Expiration Date:   3/24/2022     Order Specific Question:   Reason for exam:     Answer:   Evaluate for congenital anomaly    SYLVIA Screen with Reflex     Standing Status:   Future     Standing Expiration Date:   2/24/2023    Rheumatoid Factor     Standing Status:   Future     Standing Expiration Date:   2/24/2023       Orders Placed This Encounter   Medications    mometasone (ELOCON) 0.1 % cream     Sig: Apply small amount nightly to opening of external ear canal.  Continue to use nightly until symptoms of dryness and itching improve. Then may taper frequency of use to minimum necessary to control symptoms     Dispense:  1 each     Refill:  2             Please note that this chart was generated using dragon dictation software. Although every effort was made to ensure the accuracy of this automated transcription, some errors in transcription may have occurred.

## 2022-02-24 NOTE — ASSESSMENT & PLAN NOTE
Tender over TMJ. Denies bruxism. Recently had maxillary dentition extracted apparently because of caries. Multiple maxillary caries and broken crowns. Scheduled to have additional treatment upcoming. I think that TMJ arthralgia is a factor in her symptoms. She has no evidence of middle ear disease on today's visit.

## 2022-02-24 NOTE — PROGRESS NOTES
19 y.o.  female presents today with history of recurrent ear infections. She states she was treated 6 or 8 times last calendar year and has already been treated this year for bilateral ear infection. Recently she took antibiotics along with a steroids because of migraine symptoms. Since completing the course of steroids she feels that her ear does feel better. Family History   Problem Relation Age of Onset    Rheum Arthritis Mother     Depression Mother     Diabetes Paternal Grandmother     Colon Cancer Maternal Grandmother     Dementia Maternal Grandmother     Heart Disease Maternal Grandmother     Dementia Maternal Grandfather     Heart Disease Maternal Grandfather     Cancer Maternal Grandfather      Social History     Socioeconomic History    Marital status: Single     Spouse name: Not on file    Number of children: Not on file    Years of education: Not on file    Highest education level: Not on file   Occupational History    Not on file   Tobacco Use    Smoking status: Never Smoker    Smokeless tobacco: Never Used   Substance and Sexual Activity    Alcohol use: No    Drug use: No    Sexual activity: Never   Other Topics Concern    Not on file   Social History Narrative    Not on file     Social Determinants of Health     Financial Resource Strain:     Difficulty of Paying Living Expenses: Not on file   Food Insecurity:     Worried About Running Out of Food in the Last Year: Not on file    Herminio of Food in the Last Year: Not on file   Transportation Needs:     Lack of Transportation (Medical): Not on file    Lack of Transportation (Non-Medical):  Not on file   Physical Activity:     Days of Exercise per Week: Not on file    Minutes of Exercise per Session: Not on file   Stress:     Feeling of Stress : Not on file   Social Connections:     Frequency of Communication with Friends and Family: Not on file    Frequency of Social Gatherings with Friends and Family: Not on file  Attends Denominational Services: Not on file    Active Member of Clubs or Organizations: Not on file    Attends Club or Organization Meetings: Not on file    Marital Status: Not on file   Intimate Partner Violence:     Fear of Current or Ex-Partner: Not on file    Emotionally Abused: Not on file    Physically Abused: Not on file    Sexually Abused: Not on file   Housing Stability:     Unable to Pay for Housing in the Last Year: Not on file    Number of [place] in the Last Year: Not on file    Unstable Housing in the Last Year: Not on file     Past Medical History:   Diagnosis Date    Anxiety     Asthma     Gastritis      Past Surgical History:   Procedure Laterality Date    CHOLECYSTECTOMY  02/22/2017         REVIEW OF SYSTEMS:  all other systems reviewed and are negative  General Health: no change in health status since last visit  Ears: ear pain No Resolved ear popping/ fullness No  none and Resolved  Hearing: denies hearing problems       Comments:     PHYSICAL EXAM:    There were no vitals taken for this visit. There is no height or weight on file to calculate BMI.     General Appearance: well developed , well nourished and no distress  Head/ Face: normocephalic and atraumatic  Vocal Quality: good/ normal  Ears: Right Ear: External: external ears normal and TMJ tender : Yes Otoscopy Ear Canal: canal clear Otoscopy TM: TM's normal, TM's mobile and TM's intact Left Ear: External: external ears normal and TMJ tender : Yes Otoscopy Ear Canal: canal clear Otoscopy TM: TM's normal, TM's mobile and TM's intact  Hearing: grossly intact, Rinne A>B: Left, Rinne A>B: Right and see audiogram  Oral:lips: normal Oropharynx:normal tongue: normal   Dentition: good dentition   Neuro: alert and oriented x3  Psych/ Mood: cooperative and no depression, anxiety or agitation    Assessment & Plan:    Problem List Items Addressed This Visit        ENT Problems    Middle ear effusion, right     On today's exam her right middle ear space was completely clear with normal hearing levels            Other    Bilateral temporomandibular joint pain     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 She does wear a bite guard. I discussed with her that often it is difficult to determine whether ear pain is from a middle ear infection or arthritic inflammation related to her TMJ. Recent steroid prescription has given her relief and frankly this could have treated either middle ear disease or TMJ arthritis. For now would not recommend tubes as her ear is completely normal.  This is my first encounter with this nice young lady. I told her that should her ear symptoms recur that we would be happy to evaluate her. No orders of the defined types were placed in this encounter. No orders of the defined types were placed in this encounter. Please note that this chart was generated using dragon dictation software. Although every effort was made to ensure the accuracy of this automated transcription, some errors in transcription may have occurred.

## 2022-02-27 LAB — ANA IGG, ELISA: NORMAL

## 2022-03-01 ENCOUNTER — TELEPHONE (OUTPATIENT)
Dept: ENT CLINIC | Age: 22
End: 2022-03-01

## 2022-03-01 NOTE — TELEPHONE ENCOUNTER
I called patient back. She said that it shows in Mychart that she has a bill. I looked in her charges. It shows that there are several visits waiting to go through insurance but it is not showing a patient balance at this time. Pt said that is all she needed.

## 2022-03-01 NOTE — TELEPHONE ENCOUNTER
Edith Murphy requests that the office return her call in regards to billing questions. Please return call to patient. The best time to reach her is Anytime. Thank you.

## 2022-03-03 ENCOUNTER — TELEPHONE (OUTPATIENT)
Dept: ENT CLINIC | Age: 22
End: 2022-03-03

## 2022-03-03 ENCOUNTER — HOSPITAL ENCOUNTER (OUTPATIENT)
Dept: CT IMAGING | Age: 22
Discharge: HOME | End: 2022-03-03
Payer: MEDICAID

## 2022-03-03 DIAGNOSIS — H90.3 BILATERAL SENSORINEURAL HEARING LOSS: ICD-10-CM

## 2022-03-03 DIAGNOSIS — H92.03 OTALGIA OF BOTH EARS: ICD-10-CM

## 2022-03-03 PROCEDURE — 70480 CT ORBIT/EAR/FOSSA W/O DYE: CPT

## 2022-03-03 NOTE — TELEPHONE ENCOUNTER
----- Message from Jenna Villatoro MD sent at 3/1/2022  3:01 PM CST -----  Let family know that blood work was negative and did not show any evidence of autoimmune cause for her hearing loss  Forward lab to PCP  ----- Message -----  From: Tahmina Pichardo Incoming Lab Results From [facility]  Sent: 2/24/2022   5:50 PM CST  To: Jenna Villatoro MD

## 2022-03-21 ENCOUNTER — OFFICE VISIT (OUTPATIENT)
Dept: ENT CLINIC | Age: 22
End: 2022-03-21
Payer: MEDICAID

## 2022-03-21 ENCOUNTER — PROCEDURE VISIT (OUTPATIENT)
Dept: ENT CLINIC | Age: 22
End: 2022-03-21
Payer: MEDICAID

## 2022-03-21 VITALS — HEIGHT: 64 IN | WEIGHT: 196 LBS | BODY MASS INDEX: 33.46 KG/M2

## 2022-03-21 DIAGNOSIS — H90.3 SENSORINEURAL HEARING LOSS (SNHL) OF BOTH EARS: Primary | ICD-10-CM

## 2022-03-21 DIAGNOSIS — H90.3 BILATERAL SENSORINEURAL HEARING LOSS: ICD-10-CM

## 2022-03-21 PROCEDURE — 92553 AUDIOMETRY AIR & BONE: CPT | Performed by: AUDIOLOGIST

## 2022-03-21 PROCEDURE — 99212 OFFICE O/P EST SF 10 MIN: CPT | Performed by: OTOLARYNGOLOGY

## 2022-03-21 NOTE — PROGRESS NOTES
78-year-old female returns today for follow-up. She feels that her right ear may have worsened since I last saw her so we will obtain audiometric evaluation on today's visit. Otherwise she is stable. She feels that the cream is helping with the itching and discomfort in her ear canals. Today's ear examination is unremarkable on inspection. SYLVIA levels and rheumatoid factor were both negative. CT scan was negative for any type of temporal bone abnormality. Because she felt that her right hearing levels worsen audiogram was performed today. Audiogram showed slight progression of hearing loss in both ears. Levels were symmetric. Will repeat audiogram in 2 weeks.   If results reproducible refer to [facility] otology group

## 2022-03-21 NOTE — ASSESSMENT & PLAN NOTE
SYLVIA and rheumatoid factor both negative  Today's audiogram showed slight progression of the hearing loss in both ears. No evidence of conductive component  No complaints of tinnitus    We will repeat hearing testing in 2 weeks.   If results consistent and reproducible will refer to [facility] otology group

## 2022-03-21 NOTE — PROGRESS NOTES
History   Jessica Antoine is a 19 y.o. female who presented to the clinic this date with complaints of decreased hearing in her right ear. She was last tested in February and found to have bilateral SNHL. She reported her right ear has gotten worse since last exam.    Summary   Pure tone testing indicates moderate SNHL bilaterally. When compared to audiogram obtained in February a drop was noted in both ears. Results   Audiometry:    Right: Moderate sloping SNHL   Left: Moderate sloping SNHL       Plan   Results of today's testing were discussed with Ms. Megan Cartwright and the following recommendations were made:    1. Follow up with ENT as scheduled. 2. Recheck hearing in 2 weeks.         Audiogram and Acoustic Immittance

## 2022-04-06 ENCOUNTER — PROCEDURE VISIT (OUTPATIENT)
Dept: ENT CLINIC | Age: 22
End: 2022-04-06
Payer: MEDICAID

## 2022-04-06 DIAGNOSIS — H90.3 SENSORINEURAL HEARING LOSS (SNHL) OF BOTH EARS: Primary | ICD-10-CM

## 2022-04-06 PROCEDURE — 92552 PURE TONE AUDIOMETRY AIR: CPT | Performed by: AUDIOLOGIST

## 2022-04-06 NOTE — PROGRESS NOTES
History   Yash Doyle is a 19 y.o. female who presented to the clinic this date for a recheck of hearing to check stability after a drop in right ear hearing. She feels her right ear has further deteriorated since last visit. Summary   Pure tone testing indicates moderate SNHL bilaterally. Hearing continued to fluctuate slightly in both ears. Results   Otoscopy:    Right: Clear EAC/Normal TM   Left: Clear EAC/Normal TM    Audiometry:    Right: Moderate flat SNHL   Left: Moderate flat SNHL         Plan   Results of today's testing were discussed with Ms. Nessa Quiles and the following recommendations were made:    1. Follow up with ENT as scheduled.         Audiogram and Acoustic Immittance

## 2022-05-03 DIAGNOSIS — H90.3 SENSORINEURAL HEARING LOSS (SNHL) OF BOTH EARS: Primary | ICD-10-CM

## 2022-05-05 ENCOUNTER — TELEPHONE (OUTPATIENT)
Dept: ENT CLINIC | Age: 22
End: 2022-05-05

## 2022-05-05 NOTE — TELEPHONE ENCOUNTER
----- Message from Charity Gonzales MD sent at 4/29/2022 10:25 AM CDT -----  This young lady continues to show deterioration of her hearing in both ears  She has had negative testing for autoimmune disease  She needs to be referred to otology. If she has commercial insurance she can be seen by Dr. Hao Ambrose. If she has Medicaid she needs to go to [facility]  ----- Message -----  From: Aisha Mayers  Sent: 4/7/2022   9:11 AM CDT  To: Charity Gonzales MD    This patient came in yesterday for a 2 weeks recheck of hearing. She has bilateral hearing loss but her hearing had dropped significantly in her right ear. She continues to fluctuate a bit in both ears. Your last note said if she was still having problems when I retested her that you were going to refer her to otology.

## 2022-05-05 NOTE — TELEPHONE ENCOUNTER
Pt of Dr Lazarus Minion and was ref to [facility] per pt request and due to insurance. Dr. Juan Carlos Sinclair to follow.

## 2022-10-10 DIAGNOSIS — R11.2 NAUSEA AND VOMITING, UNSPECIFIED VOMITING TYPE: Primary | ICD-10-CM

## 2022-10-10 DIAGNOSIS — R42 VERTIGO: ICD-10-CM

## 2022-10-10 RX ORDER — PROMETHAZINE HYDROCHLORIDE 25 MG/1
25 TABLET ORAL EVERY 6 HOURS PRN
Qty: 10 TABLET | Refills: 2 | Status: SHIPPED | OUTPATIENT
Start: 2022-10-10

## 2022-10-10 RX ORDER — MECLIZINE HYDROCHLORIDE 50 MG/1
50 TABLET ORAL 3 TIMES DAILY PRN
Qty: 90 TABLET | Refills: 0 | Status: SHIPPED | OUTPATIENT
Start: 2022-10-10 | End: 2022-12-29

## 2022-10-12 ENCOUNTER — LAB (OUTPATIENT)
Dept: LAB | Facility: HOSPITAL | Age: 22
End: 2022-10-12

## 2022-10-12 ENCOUNTER — OFFICE VISIT (OUTPATIENT)
Dept: FAMILY MEDICINE CLINIC | Facility: CLINIC | Age: 22
End: 2022-10-12

## 2022-10-12 VITALS
SYSTOLIC BLOOD PRESSURE: 120 MMHG | DIASTOLIC BLOOD PRESSURE: 90 MMHG | WEIGHT: 219 LBS | HEIGHT: 64 IN | BODY MASS INDEX: 37.39 KG/M2

## 2022-10-12 DIAGNOSIS — H66.3X1 CHRONIC SUPPURATIVE OTITIS MEDIA OF RIGHT EAR, UNSPECIFIED OTITIS MEDIA LOCATION: ICD-10-CM

## 2022-10-12 DIAGNOSIS — R42 VERTIGO: ICD-10-CM

## 2022-10-12 DIAGNOSIS — R50.9 FEVER, UNSPECIFIED FEVER CAUSE: ICD-10-CM

## 2022-10-12 DIAGNOSIS — H92.03 ACUTE EAR PAIN, BILATERAL: ICD-10-CM

## 2022-10-12 DIAGNOSIS — R50.9 FEVER, UNSPECIFIED FEVER CAUSE: Primary | ICD-10-CM

## 2022-10-12 LAB
ALBUMIN SERPL-MCNC: 4.7 G/DL (ref 3.5–5)
ALBUMIN/GLOB SERPL: 1.3 G/DL (ref 1.1–2.5)
ALP SERPL-CCNC: 93 U/L (ref 24–120)
ALT SERPL W P-5'-P-CCNC: 23 U/L (ref 0–35)
ANION GAP SERPL CALCULATED.3IONS-SCNC: 10 MMOL/L (ref 4–13)
AST SERPL-CCNC: 24 U/L (ref 7–45)
AUTO MIXED CELLS #: 0.7 10*3/MM3 (ref 0.1–2.6)
AUTO MIXED CELLS %: 8.2 % (ref 0.1–24)
BILIRUB SERPL-MCNC: 0.5 MG/DL (ref 0.1–1)
BILIRUB UR QL STRIP: NEGATIVE
BUN SERPL-MCNC: 13 MG/DL (ref 5–21)
BUN/CREAT SERPL: 15.1
CALCIUM SPEC-SCNC: 9.8 MG/DL (ref 8.4–10.4)
CHLORIDE SERPL-SCNC: 103 MMOL/L (ref 98–110)
CLARITY UR: CLEAR
CO2 SERPL-SCNC: 27 MMOL/L (ref 24–31)
COLOR UR: YELLOW
CREAT SERPL-MCNC: 0.86 MG/DL (ref 0.5–1.4)
EGFRCR SERPLBLD CKD-EPI 2021: 96.9 ML/MIN/1.73
ERYTHROCYTE [DISTWIDTH] IN BLOOD BY AUTOMATED COUNT: 12.4 % (ref 12.3–15.4)
FLUAV AG NPH QL: NEGATIVE
FLUBV AG NPH QL IA: NEGATIVE
GLOBULIN UR ELPH-MCNC: 3.6 GM/DL
GLUCOSE SERPL-MCNC: 102 MG/DL (ref 70–100)
GLUCOSE UR STRIP-MCNC: NEGATIVE MG/DL
HCT VFR BLD AUTO: 41.7 % (ref 34–46.6)
HGB BLD-MCNC: 14.5 G/DL (ref 12–15.9)
HGB UR QL STRIP.AUTO: NEGATIVE
KETONES UR QL STRIP: NEGATIVE
LEUKOCYTE ESTERASE UR QL STRIP.AUTO: NEGATIVE
LYMPHOCYTES # BLD AUTO: 1.7 10*3/MM3 (ref 0.7–3.1)
LYMPHOCYTES NFR BLD AUTO: 18.8 % (ref 19.6–45.3)
MCH RBC QN AUTO: 31.4 PG (ref 26.6–33)
MCHC RBC AUTO-ENTMCNC: 34.8 G/DL (ref 31.5–35.7)
MCV RBC AUTO: 90.3 FL (ref 79–97)
NEUTROPHILS NFR BLD AUTO: 6.6 10*3/MM3 (ref 1.7–7)
NEUTROPHILS NFR BLD AUTO: 73 % (ref 42.7–76)
NITRITE UR QL STRIP: NEGATIVE
PH UR STRIP.AUTO: 6 [PH] (ref 5–8)
PLATELET # BLD AUTO: 298 10*3/MM3 (ref 140–450)
PMV BLD AUTO: 8.2 FL (ref 6–12)
POTASSIUM SERPL-SCNC: 4 MMOL/L (ref 3.5–5.3)
PROT SERPL-MCNC: 8.3 G/DL (ref 6.3–8.7)
PROT UR QL STRIP: NEGATIVE
RBC # BLD AUTO: 4.62 10*6/MM3 (ref 3.77–5.28)
RSV AG SPEC QL: NEGATIVE
SARS-COV-2 RNA PNL SPEC NAA+PROBE: NOT DETECTED
SODIUM SERPL-SCNC: 140 MMOL/L (ref 135–145)
SP GR UR STRIP: 1.02 (ref 1–1.03)
UROBILINOGEN UR QL STRIP: NORMAL
WBC NRBC COR # BLD: 9 10*3/MM3 (ref 3.4–10.8)

## 2022-10-12 PROCEDURE — 87635 SARS-COV-2 COVID-19 AMP PRB: CPT

## 2022-10-12 PROCEDURE — 87807 RSV ASSAY W/OPTIC: CPT

## 2022-10-12 PROCEDURE — 36415 COLL VENOUS BLD VENIPUNCTURE: CPT

## 2022-10-12 PROCEDURE — 80053 COMPREHEN METABOLIC PANEL: CPT

## 2022-10-12 PROCEDURE — 85025 COMPLETE CBC W/AUTO DIFF WBC: CPT

## 2022-10-12 PROCEDURE — 81003 URINALYSIS AUTO W/O SCOPE: CPT

## 2022-10-12 PROCEDURE — 87804 INFLUENZA ASSAY W/OPTIC: CPT

## 2022-10-12 PROCEDURE — 99203 OFFICE O/P NEW LOW 30 MIN: CPT | Performed by: NURSE PRACTITIONER

## 2022-10-12 RX ORDER — DIAZEPAM 2 MG/1
2 TABLET ORAL 2 TIMES DAILY PRN
COMMUNITY
End: 2022-12-29 | Stop reason: SDUPTHER

## 2022-10-12 RX ORDER — TRAMADOL HYDROCHLORIDE 50 MG/1
50 TABLET ORAL EVERY 6 HOURS PRN
Qty: 12 TABLET | Refills: 0 | Status: SHIPPED | OUTPATIENT
Start: 2022-10-12 | End: 2022-10-12

## 2022-10-12 RX ORDER — TRAMADOL HYDROCHLORIDE 50 MG/1
50 TABLET ORAL EVERY 6 HOURS PRN
Qty: 12 TABLET | Refills: 0 | Status: SHIPPED | OUTPATIENT
Start: 2022-10-12 | End: 2022-10-15

## 2022-10-12 RX ORDER — METHYLPHENIDATE HYDROCHLORIDE 54 MG/1
TABLET, EXTENDED RELEASE ORAL
COMMUNITY
Start: 2022-09-30 | End: 2022-12-29

## 2022-10-12 RX ORDER — CEFDINIR 300 MG/1
300 CAPSULE ORAL 2 TIMES DAILY
Qty: 20 CAPSULE | Refills: 0 | Status: SHIPPED | OUTPATIENT
Start: 2022-10-12 | End: 2022-12-29

## 2022-10-12 NOTE — PROGRESS NOTES
"Chief Complaint  Tympanostomy, Dizziness, Nausea, and Shaking    Subjective        Lucrecia Lloyd presents to [facility]  History of Present Illness  Pt is here today for a f/u with Bessy SHANTELLE for nausea, shaking, and dizziness post Tympanostomy. Pt reports Antivert and Zofran did not reduce symptoms.      Objective   Vital Signs:  /90   Ht 162.6 cm (64\")   Wt 99.3 kg (219 lb)   BMI 37.59 kg/m²   Estimated body mass index is 37.59 kg/m² as calculated from the following:    Height as of this encounter: 162.6 cm (64\").    Weight as of this encounter: 99.3 kg (219 lb).    Class 2 Severe Obesity (BMI >=35 and <=39.9). Obesity-related health conditions include the following: GERD. Obesity is newly identified. BMI is is above average; BMI management plan is completed. We discussed portion control and increasing exercise.      Physical Exam  Vitals and nursing note reviewed.   Constitutional:       Appearance: Normal appearance. She is well-developed. She is obese.   HENT:      Head: Normocephalic and atraumatic.      Right Ear: Ear canal normal. Drainage and tenderness present. There is mastoid tenderness. A PE tube is present. Tympanic membrane is erythematous.      Left Ear: Ear canal normal. Drainage and tenderness present. There is mastoid tenderness. A PE tube is present. Tympanic membrane is erythematous.      Nose: Nose normal. No septal deviation, nasal tenderness or congestion.      Mouth/Throat:      Lips: Pink. No lesions.      Mouth: Mucous membranes are moist. No oral lesions.      Dentition: Normal dentition.      Pharynx: Oropharynx is clear. No pharyngeal swelling, oropharyngeal exudate or posterior oropharyngeal erythema.   Eyes:      General: Lids are normal. Vision grossly intact. No scleral icterus.        Right eye: No discharge.         Left eye: No discharge.      Extraocular Movements: Extraocular movements intact.      Conjunctiva/sclera: Conjunctivae " normal.      Right eye: Right conjunctiva is not injected.      Left eye: Left conjunctiva is not injected.      Pupils: Pupils are equal, round, and reactive to light.   Neck:      Thyroid: No thyroid mass.      Trachea: Trachea normal.   Cardiovascular:      Rate and Rhythm: Normal rate and regular rhythm.      Heart sounds: Normal heart sounds. No murmur heard.    No gallop.   Pulmonary:      Effort: Pulmonary effort is normal.      Breath sounds: Normal breath sounds and air entry. No wheezing, rhonchi or rales.   Musculoskeletal:         General: No tenderness or deformity. Normal range of motion.      Cervical back: Full passive range of motion without pain, normal range of motion and neck supple.      Thoracic back: Normal.      Right lower leg: No edema.      Left lower leg: No edema.   Skin:     General: Skin is warm and dry.      Coloration: Skin is not jaundiced.      Findings: No rash.   Neurological:      Mental Status: She is alert and oriented to person, place, and time.      Cranial Nerves: Cranial nerves are intact.      Sensory: Sensation is intact.      Motor: Motor function is intact.      Coordination: Coordination is intact.      Gait: Gait is intact.      Deep Tendon Reflexes: Reflexes are normal and symmetric.   Psychiatric:         Mood and Affect: Mood and affect normal.         Behavior: Behavior normal.         Judgment: Judgment normal.        Result Review :                Assessment and Plan   Diagnoses and all orders for this visit:    1. Fever, unspecified fever cause (Primary)  -     RSV Screen - Swab, Nasopharynx; Future  -     Influenza Antigen, Rapid - Swab, Nasopharynx; Future  -     COVID-19,[employer] [employer] IN-HOUSE,Nasal Swab No Transport Media 3-4 HR TAT - Swab, Nasal Cavity; Future  -     CBC w AUTO Differential; Future  -     Comprehensive metabolic panel; Future  -     Urinalysis With Culture If Indicated - Urine, Clean Catch; Future    2. Vertigo    3. Chronic suppurative otitis  media of right ear, unspecified otitis media location  -     cefdinir (OMNICEF) 300 MG capsule; Take 1 capsule by mouth 2 (Two) Times a Day.  Dispense: 20 capsule; Refill: 0  -     CBC w AUTO Differential; Future  -     Comprehensive metabolic panel; Future  -     Urinalysis With Culture If Indicated - Urine, Clean Catch; Future    4. Acute ear pain, bilateral  -     Discontinue: traMADol (ULTRAM) 50 MG tablet; Take 1 tablet by mouth Every 6 (Six) Hours As Needed for Severe Pain for up to 3 days.  Dispense: 12 tablet; Refill: 0  -     traMADol (ULTRAM) 50 MG tablet; Take 1 tablet by mouth Every 6 (Six) Hours As Needed for Severe Pain for up to 3 days.  Dispense: 12 tablet; Refill: 0    Patient recently had tube placed in her ears for worsening decreased hearing. This was done in [facility]. She has been nauseated, dizzy, weak, chills, and low grade fever. She has attempted to call their office many times but has not heard back from them. On exam, the tubes are noticeable. There is bloody drainage which is to be expected. Ears are very tender to pulling and exam. The TMs themselves are very red and bulging around the tubes. Labs and urine was compelted and completely unremarkable. COVID, Flu, and RSV also negative  Plan:  1. meds sent in for nausea and vertigo in a separate encounted  2. Will add an oral abx. She is to continue the ear drops the surgerion prescribed.  3. Tramadol PRN pain  4. Continue to try and contact the surgeon         Follow Up   Return if symptoms worsen or fail to improve.  Patient was given instructions and counseling regarding her condition or for health maintenance advice. Please see specific information pulled into the AVS if appropriate.

## 2022-10-24 RX ORDER — OFLOXACIN 3 MG/ML
5 SOLUTION AURICULAR (OTIC) DAILY
Qty: 10 ML | Refills: 2 | Status: SHIPPED | OUTPATIENT
Start: 2022-10-24 | End: 2023-02-14

## 2022-11-28 RX ORDER — CIPROFLOXACIN 500 MG/1
500 TABLET, FILM COATED ORAL 2 TIMES DAILY
Qty: 20 TABLET | Refills: 0 | Status: SHIPPED | OUTPATIENT
Start: 2022-11-28 | End: 2022-12-08

## 2022-11-30 ENCOUNTER — DOCUMENTATION (OUTPATIENT)
Dept: INTERNAL MEDICINE | Facility: CLINIC | Age: 22
End: 2022-11-30

## 2022-11-30 RX ORDER — METHYLPREDNISOLONE 4 MG/1
TABLET ORAL
Qty: 21 TABLET | Refills: 0 | Status: SHIPPED | OUTPATIENT
Start: 2022-11-30 | End: 2022-12-29

## 2022-12-29 ENCOUNTER — TELEPHONE (OUTPATIENT)
Dept: FAMILY MEDICINE CLINIC | Facility: CLINIC | Age: 22
End: 2022-12-29

## 2022-12-29 ENCOUNTER — HOSPITAL ENCOUNTER (OUTPATIENT)
Dept: CT IMAGING | Facility: HOSPITAL | Age: 22
Discharge: HOME | End: 2022-12-29

## 2022-12-29 ENCOUNTER — HOSPITAL ENCOUNTER (OUTPATIENT)
Dept: GENERAL RADIOLOGY | Facility: HOSPITAL | Age: 22
Discharge: HOME | End: 2022-12-29

## 2022-12-29 ENCOUNTER — OFFICE VISIT (OUTPATIENT)
Dept: FAMILY MEDICINE CLINIC | Facility: CLINIC | Age: 22
End: 2022-12-29
Payer: OTHER MISCELLANEOUS

## 2022-12-29 VITALS
DIASTOLIC BLOOD PRESSURE: 84 MMHG | TEMPERATURE: 98 F | SYSTOLIC BLOOD PRESSURE: 127 MMHG | WEIGHT: 224 LBS | BODY MASS INDEX: 38.24 KG/M2 | OXYGEN SATURATION: 99 % | HEART RATE: 83 BPM | HEIGHT: 64 IN | RESPIRATION RATE: 20 BRPM

## 2022-12-29 DIAGNOSIS — Z02.6 ENCOUNTER RELATED TO WORKER'S COMPENSATION CLAIM: Primary | ICD-10-CM

## 2022-12-29 DIAGNOSIS — E66.9 CLASS 2 OBESITY WITH BODY MASS INDEX (BMI) OF 38.0 TO 38.9 IN ADULT, UNSPECIFIED OBESITY TYPE, UNSPECIFIED WHETHER SERIOUS COMORBIDITY PRESENT: ICD-10-CM

## 2022-12-29 DIAGNOSIS — M54.2 NECK PAIN: ICD-10-CM

## 2022-12-29 DIAGNOSIS — R51.9 ACUTE NONINTRACTABLE HEADACHE, UNSPECIFIED HEADACHE TYPE: ICD-10-CM

## 2022-12-29 DIAGNOSIS — S09.90XA INJURY OF HEAD, INITIAL ENCOUNTER: ICD-10-CM

## 2022-12-29 PROBLEM — E66.812 CLASS 2 OBESITY WITH BODY MASS INDEX (BMI) OF 38.0 TO 38.9 IN ADULT: Status: ACTIVE | Noted: 2022-12-29

## 2022-12-29 PROCEDURE — 99214 OFFICE O/P EST MOD 30 MIN: CPT | Performed by: NURSE PRACTITIONER

## 2022-12-29 PROCEDURE — 70450 CT HEAD/BRAIN W/O DYE: CPT

## 2022-12-29 PROCEDURE — 72040 X-RAY EXAM NECK SPINE 2-3 VW: CPT

## 2022-12-29 RX ORDER — LAMOTRIGINE 100 MG/1
TABLET ORAL
COMMUNITY
Start: 2022-12-09

## 2022-12-29 RX ORDER — LAMOTRIGINE 25 MG/1
TABLET ORAL
COMMUNITY
Start: 2022-12-09 | End: 2023-03-20

## 2022-12-29 RX ORDER — MONTELUKAST SODIUM 10 MG/1
10 TABLET ORAL DAILY
COMMUNITY

## 2022-12-29 RX ORDER — SERTRALINE HYDROCHLORIDE 150 MG/1
CAPSULE ORAL
COMMUNITY
Start: 2022-12-09 | End: 2023-03-20

## 2022-12-29 RX ORDER — DIAZEPAM 5 MG/1
5 TABLET ORAL DAILY PRN
COMMUNITY

## 2022-12-29 NOTE — TELEPHONE ENCOUNTER
Called patient and informed of Negative cervical spine xray. Patient needs work excuse faxed. Done.

## 2022-12-29 NOTE — PROGRESS NOTES
CT of head shows no acute intracranial process.  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  Discussed this with Dr. Sierra, he does not feel that this is anything concerning and more an incidental finding in nature, but I have sent a secure chat message to Dr. Wilkinson to see if there is any further recommendations for this from his stand point.

## 2022-12-29 NOTE — TELEPHONE ENCOUNTER
----- Message from KRISTEN Ruiz sent at 12/29/2022  4:24 PM CST -----  Negative cervical spine xray.

## 2022-12-29 NOTE — TELEPHONE ENCOUNTER
Called patient and informed of CT of head shows no acute intracranial process.  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  Discussed this with Dr. Sierra, he does not feel that this is anything concerning and more an incidental finding in nature, but I have sent a secure [employer] message to Dr. Wilkinson to see if there is any further recommendations for this from his stand point. Informed patient we will call her when we hear back from him/ VU.
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
## 2022-12-29 NOTE — TELEPHONE ENCOUNTER
----- Message from KRISTEN Ruiz sent at 12/29/2022  4:48 PM CST -----  CT of head shows no acute intracranial process.  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  Discussed this with Dr. Sierra, he does not feel that this is anything concerning and more an incidental finding in nature, but I have sent a secure [employer] message to Dr. Wilkinson to see if there is any further recommendations for this from his stand point.

## 2023-01-20 ENCOUNTER — OFFICE VISIT (OUTPATIENT)
Dept: FAMILY MEDICINE CLINIC | Facility: CLINIC | Age: 23
End: 2023-01-20
Payer: MEDICAID

## 2023-01-20 VITALS
TEMPERATURE: 98.7 F | BODY MASS INDEX: 37.39 KG/M2 | WEIGHT: 219 LBS | HEART RATE: 69 BPM | SYSTOLIC BLOOD PRESSURE: 119 MMHG | HEIGHT: 64 IN | OXYGEN SATURATION: 98 % | RESPIRATION RATE: 20 BRPM | DIASTOLIC BLOOD PRESSURE: 81 MMHG

## 2023-01-20 DIAGNOSIS — J01.90 ACUTE SINUSITIS, RECURRENCE NOT SPECIFIED, UNSPECIFIED LOCATION: Primary | ICD-10-CM

## 2023-01-20 PROCEDURE — 99213 OFFICE O/P EST LOW 20 MIN: CPT | Performed by: NURSE PRACTITIONER

## 2023-01-20 PROCEDURE — 96372 THER/PROPH/DIAG INJ SC/IM: CPT | Performed by: NURSE PRACTITIONER

## 2023-01-20 RX ORDER — DEXAMETHASONE SODIUM PHOSPHATE 4 MG/ML
8 INJECTION, SOLUTION INTRA-ARTICULAR; INTRALESIONAL; INTRAMUSCULAR; INTRAVENOUS; SOFT TISSUE ONCE
Status: COMPLETED | OUTPATIENT
Start: 2023-01-20 | End: 2023-01-20

## 2023-01-20 RX ORDER — DEXTROAMPHETAMINE/AMPHETAMINE 10 MG
CAPSULE, EXT RELEASE 24 HR ORAL
COMMUNITY
Start: 2023-01-06 | End: 2023-03-20

## 2023-01-20 RX ORDER — CEFUROXIME AXETIL 500 MG/1
500 TABLET ORAL 2 TIMES DAILY
Qty: 20 TABLET | Refills: 0 | Status: SHIPPED | OUTPATIENT
Start: 2023-01-20 | End: 2023-02-27

## 2023-01-20 RX ADMIN — DEXAMETHASONE SODIUM PHOSPHATE 8 MG: 4 INJECTION, SOLUTION INTRA-ARTICULAR; INTRALESIONAL; INTRAMUSCULAR; INTRAVENOUS; SOFT TISSUE at 12:05

## 2023-01-20 NOTE — PROGRESS NOTES
After obtaining consent, and per orders of Yvette Fontanez, injection of Decadron 8mg given by Petra Duran LPN. Patient instructed to remain in clinic for 20 minutes afterwards, and to report any adverse reaction to me immediately. Pt tolerated well with no adverse reactions.

## 2023-01-20 NOTE — PROGRESS NOTES
"Chief Complaint  Sore Throat, Cough, Vomiting, Nasal Congestion, and Headache    Subjective    History of Present Illness      Patient presents to [facility] for   History of Present Illness  Pt is here today due to cough, nasal congestion, sore throat x 2 weeks.  Pt also c/o vomiting x 2 days.  Pt reports highest has gotten was 100.2.  No fever at this time.       Review of Systems    I have reviewed and agree with the HPI information as above.  Yvette Salazar, APRN     Objective   Vital Signs:   /81   Pulse 69   Temp 98.7 °F (37.1 °C)   Resp 20   Ht 162.6 cm (64\")   Wt 99.3 kg (219 lb)   SpO2 98%   BMI 37.59 kg/m²     Class 2 Severe Obesity (BMI >=35 and <=39.9). Obesity-related health conditions include the following: none. Obesity is unchanged. BMI is is above average; BMI management plan is completed. We discussed portion control and increasing exercise.      Physical Exam  Vitals and nursing note reviewed.   Constitutional:       Appearance: Normal appearance.   HENT:      Head: Normocephalic and atraumatic.      Right Ear: Tympanic membrane is erythematous and bulging.      Left Ear: Tympanic membrane is erythematous and bulging.      Nose: Congestion present.      Mouth/Throat:      Pharynx: Posterior oropharyngeal erythema present.   Cardiovascular:      Rate and Rhythm: Normal rate and regular rhythm.      Pulses: Normal pulses.      Heart sounds: Normal heart sounds.   Pulmonary:      Effort: Pulmonary effort is normal.   Musculoskeletal:         General: Normal range of motion.      Cervical back: Normal range of motion and neck supple.   Skin:     General: Skin is warm and dry.   Neurological:      General: No focal deficit present.      Mental Status: She is alert and oriented to person, place, and time.   Psychiatric:         Mood and Affect: Mood normal.         Behavior: Behavior normal.          RAJEEV-7:      PHQ-2 Depression Screening  Little " interest or pleasure in doing things?     Feeling down, depressed, or hopeless?     PHQ-2 Total Score       PHQ-9 Depression Screening  Little interest or pleasure in doing things?     Feeling down, depressed, or hopeless?     Trouble falling or staying asleep, or sleeping too much?     Feeling tired or having little energy?     Poor appetite or overeating?     Feeling bad about yourself - or that you are a failure or have let yourself or your family down?     Trouble concentrating on things, such as reading the newspaper or watching television?     Moving or speaking so slowly that other people could have noticed? Or the opposite - being so fidgety or restless that you have been moving around a lot more than usual?     Thoughts that you would be better off dead, or of hurting yourself in some way?     PHQ-9 Total Score     If you checked off any problems, how difficult have these problems made it for you to do your work, take care of things at home, or get along with other people?        Result Review  Data Reviewed:                   Assessment and Plan      Diagnoses and all orders for this visit:    1. Acute sinusitis, recurrence not specified, unspecified location (Primary)  -     dexamethasone (DECADRON) injection 8 mg  -     cefuroxime (CEFTIN) 500 MG tablet; Take 1 tablet by mouth 2 (Two) Times a Day.  Dispense: 20 tablet; Refill: 0    Pt is here today due to cough, nasal congestion, sore throat x 2 weeks.  Pt also c/o vomiting x 2 days.  Pt reports highest has gotten was 100.2.  No fever at this time.          Follow Up   Return if symptoms worsen or fail to improve.  Patient was given instructions and counseling regarding her condition or for health maintenance advice. Please see specific information pulled into the AVS if appropriate.

## 2023-02-14 RX ORDER — OFLOXACIN 3 MG/ML
SOLUTION AURICULAR (OTIC)
Qty: 10 ML | Refills: 2 | Status: SHIPPED | OUTPATIENT
Start: 2023-02-14

## 2023-02-27 RX ORDER — CEFDINIR 300 MG/1
300 CAPSULE ORAL 2 TIMES DAILY
Qty: 20 CAPSULE | Refills: 0 | Status: SHIPPED | OUTPATIENT
Start: 2023-02-27 | End: 2023-03-20

## 2023-03-20 ENCOUNTER — OFFICE VISIT (OUTPATIENT)
Dept: FAMILY MEDICINE CLINIC | Facility: CLINIC | Age: 23
End: 2023-03-20
Payer: MEDICAID

## 2023-03-20 ENCOUNTER — HOSPITAL ENCOUNTER (OUTPATIENT)
Dept: GENERAL RADIOLOGY | Facility: HOSPITAL | Age: 23
Discharge: HOME | End: 2023-03-20
Admitting: NURSE PRACTITIONER
Payer: MEDICAID

## 2023-03-20 VITALS
HEIGHT: 64 IN | SYSTOLIC BLOOD PRESSURE: 108 MMHG | BODY MASS INDEX: 37.39 KG/M2 | TEMPERATURE: 97.5 F | DIASTOLIC BLOOD PRESSURE: 73 MMHG | OXYGEN SATURATION: 97 % | WEIGHT: 219 LBS | HEART RATE: 98 BPM

## 2023-03-20 DIAGNOSIS — J45.41 MODERATE PERSISTENT ASTHMA WITH ACUTE EXACERBATION: ICD-10-CM

## 2023-03-20 DIAGNOSIS — J45.41 MODERATE PERSISTENT ASTHMA WITH ACUTE EXACERBATION: Primary | ICD-10-CM

## 2023-03-20 PROCEDURE — 1159F MED LIST DOCD IN RCRD: CPT | Performed by: NURSE PRACTITIONER

## 2023-03-20 PROCEDURE — 71046 X-RAY EXAM CHEST 2 VIEWS: CPT

## 2023-03-20 PROCEDURE — 1160F RVW MEDS BY RX/DR IN RCRD: CPT | Performed by: NURSE PRACTITIONER

## 2023-03-20 PROCEDURE — 99214 OFFICE O/P EST MOD 30 MIN: CPT | Performed by: NURSE PRACTITIONER

## 2023-03-20 RX ORDER — FLUTICASONE FUROATE AND VILANTEROL 100; 25 UG/1; UG/1
1 POWDER RESPIRATORY (INHALATION)
Qty: 28 EACH | Refills: 5 | Status: SHIPPED | OUTPATIENT
Start: 2023-03-20 | End: 2023-03-24

## 2023-03-20 RX ORDER — SERTRALINE HYDROCHLORIDE 200 MG/1
200 CAPSULE ORAL DAILY
COMMUNITY
Start: 2023-02-10

## 2023-03-20 RX ORDER — DEXAMETHASONE SODIUM PHOSPHATE 4 MG/ML
8 INJECTION, SOLUTION INTRA-ARTICULAR; INTRALESIONAL; INTRAMUSCULAR; INTRAVENOUS; SOFT TISSUE ONCE
Status: SHIPPED | OUTPATIENT
Start: 2023-03-20

## 2023-03-20 NOTE — PROGRESS NOTES
"Chief Complaint  Cough, Dizziness, Shortness of Breath, and Pain With Breathing    Subjective    History of Present Illness      Patient presents to [facility] for   History of Present Illness  Pt c/o coughing, shortness of breath, dyspnea, and dizziness x 4 days/.   Cough  This is a new problem. The current episode started in the past 7 days. The problem has been gradually worsening. The problem occurs constantly. The cough is productive of sputum and productive of brown sputum. Associated symptoms include chest pain, postnasal drip, shortness of breath and wheezing.   Dizziness  This is a new problem. The current episode started in the past 7 days. The problem has been gradually worsening. Associated symptoms include chest pain and coughing.   Shortness of Breath  This is a new problem. The current episode started in the past 7 days. The problem occurs constantly. The problem has been gradually worsening. Associated symptoms include chest pain and wheezing.   Pain With Breathing  This is a new problem. The current episode started in the past 7 days. The problem has been gradually worsening. Associated symptoms include chest pain and coughing.        Review of Systems   HENT: Positive for postnasal drip.    Respiratory: Positive for cough, shortness of breath and wheezing.    Cardiovascular: Positive for chest pain.   Neurological: Positive for dizziness.       I have reviewed and agree with the HPI information as above.  Yvette Salazar, APRN     Objective   Vital Signs:   /73   Pulse 98   Temp 97.5 °F (36.4 °C)   Ht 162.6 cm (64\")   Wt 99.3 kg (219 lb)   SpO2 97%   BMI 37.59 kg/m²     Class 2 Severe Obesity (BMI >=35 and <=39.9). Obesity-related health conditions include the following: asthma. Obesity is unchanged. BMI is is above average; BMI management plan is completed. We discussed portion control and increasing exercise.      Physical Exam  Vitals and nursing " note reviewed.   Constitutional:       Appearance: Normal appearance. She is well-developed.   HENT:      Head: Normocephalic and atraumatic.      Right Ear: Tympanic membrane, ear canal and external ear normal.      Left Ear: Tympanic membrane, ear canal and external ear normal.      Nose: Nose normal. No septal deviation, nasal tenderness or congestion.      Mouth/Throat:      Lips: Pink. No lesions.      Mouth: Mucous membranes are moist. No oral lesions.      Dentition: Normal dentition.      Pharynx: Oropharynx is clear. No pharyngeal swelling, oropharyngeal exudate or posterior oropharyngeal erythema.   Eyes:      General: Lids are normal. Vision grossly intact. No scleral icterus.        Right eye: No discharge.         Left eye: No discharge.      Extraocular Movements: Extraocular movements intact.      Conjunctiva/sclera: Conjunctivae normal.      Right eye: Right conjunctiva is not injected.      Left eye: Left conjunctiva is not injected.      Pupils: Pupils are equal, round, and reactive to light.   Neck:      Thyroid: No thyroid mass.      Trachea: Trachea normal.   Cardiovascular:      Rate and Rhythm: Normal rate and regular rhythm.      Heart sounds: Normal heart sounds. No murmur heard.    No gallop.   Pulmonary:      Effort: Pulmonary effort is normal.      Breath sounds: Normal air entry. Wheezing and rhonchi present. No rales.   Musculoskeletal:         General: No tenderness or deformity. Normal range of motion.      Cervical back: Full passive range of motion without pain, normal range of motion and neck supple.      Thoracic back: Normal.      Right lower leg: No edema.      Left lower leg: No edema.   Skin:     General: Skin is warm and dry.      Coloration: Skin is not jaundiced.      Findings: No rash.   Neurological:      Mental Status: She is alert and oriented to person, place, and time.      Sensory: Sensation is intact.      Motor: Motor function is intact.      Coordination:  Coordination is intact.      Gait: Gait is intact.      Deep Tendon Reflexes: Reflexes are normal and symmetric.   Psychiatric:         Mood and Affect: Mood and affect normal.         Behavior: Behavior normal.         Judgment: Judgment normal.             Result Review  Data Reviewed:                   Assessment and Plan      Diagnoses and all orders for this visit:    1. Moderate persistent asthma with acute exacerbation (Primary)  -     dexamethasone (DECADRON) injection 8 mg  -     XR Chest 2 View; Future  -     Fluticasone Furoate-Vilanterol (Breo Ellipta) 100-25 MCG/ACT aerosol powder ; Inhale 1 puff Daily.  Dispense: 28 each; Refill: 5      Patient is having a significant asthma flair today. Her sats are 97%. However, she is having difficulty breathing and having wheezing and rales. I offered to refer to Dr Norris for evaluation, but she wants me to treat her and see how she does.   Plan:  1. Give patient a steroid shot today  2. Start a daily inhaler  3. Get a cxr today    CXR was completed and reviewed. No signs of pneumonia at this time.         Follow Up   Return if symptoms worsen or fail to improve.  Patient was given instructions and counseling regarding her condition or for health maintenance advice. Please see specific information pulled into the AVS if appropriate.

## 2023-03-21 ENCOUNTER — DOCUMENTATION (OUTPATIENT)
Dept: FAMILY MEDICINE CLINIC | Facility: CLINIC | Age: 23
End: 2023-03-21
Payer: MEDICAID

## 2023-03-21 RX ORDER — SCOLOPAMINE TRANSDERMAL SYSTEM 1 MG/1
1 PATCH, EXTENDED RELEASE TRANSDERMAL
Qty: 12 EACH | Refills: 0 | Status: SHIPPED | OUTPATIENT
Start: 2023-03-21

## 2023-03-23 RX ORDER — ALBUTEROL SULFATE 2.5 MG/3ML
2.5 SOLUTION RESPIRATORY (INHALATION) EVERY 4 HOURS PRN
Qty: 100 ML | Refills: 12 | Status: SHIPPED | OUTPATIENT
Start: 2023-03-23

## 2023-03-24 DIAGNOSIS — J45.41 MODERATE PERSISTENT ASTHMA WITH ACUTE EXACERBATION: Primary | ICD-10-CM

## 2023-03-24 DIAGNOSIS — J01.90 ACUTE SINUSITIS, RECURRENCE NOT SPECIFIED, UNSPECIFIED LOCATION: ICD-10-CM

## 2023-03-24 RX ORDER — DOXYCYCLINE HYCLATE 100 MG/1
100 CAPSULE ORAL 2 TIMES DAILY
Qty: 20 CAPSULE | Refills: 0 | Status: SHIPPED | OUTPATIENT
Start: 2023-03-24

## 2023-03-24 RX ORDER — METHYLPREDNISOLONE 4 MG/1
TABLET ORAL
Qty: 1 EACH | Refills: 0 | Status: SHIPPED | OUTPATIENT
Start: 2023-03-24

## 2023-03-24 RX ORDER — FLUTICASONE PROPIONATE AND SALMETEROL 100; 50 UG/1; UG/1
1 POWDER RESPIRATORY (INHALATION)
Qty: 60 EACH | Refills: 3 | Status: SHIPPED | OUTPATIENT
Start: 2023-03-24

## 2023-04-11 DIAGNOSIS — J45.41 MODERATE PERSISTENT ASTHMA WITH ACUTE EXACERBATION: Primary | ICD-10-CM

## 2023-04-11 DIAGNOSIS — T78.40XD ALLERGY, SUBSEQUENT ENCOUNTER: ICD-10-CM

## 2023-05-25 ENCOUNTER — OFFICE VISIT (OUTPATIENT)
Dept: FAMILY MEDICINE CLINIC | Facility: CLINIC | Age: 23
End: 2023-05-25
Payer: MEDICAID

## 2023-05-25 ENCOUNTER — LAB (OUTPATIENT)
Dept: LAB | Facility: HOSPITAL | Age: 23
End: 2023-05-25
Payer: MEDICAID

## 2023-05-25 VITALS
SYSTOLIC BLOOD PRESSURE: 125 MMHG | BODY MASS INDEX: 36.88 KG/M2 | OXYGEN SATURATION: 99 % | HEIGHT: 64 IN | DIASTOLIC BLOOD PRESSURE: 77 MMHG | RESPIRATION RATE: 20 BRPM | HEART RATE: 73 BPM | WEIGHT: 216 LBS | TEMPERATURE: 98.7 F

## 2023-05-25 DIAGNOSIS — T78.40XD ALLERGY, SUBSEQUENT ENCOUNTER: ICD-10-CM

## 2023-05-25 DIAGNOSIS — R50.81 FEVER IN OTHER DISEASES: Primary | ICD-10-CM

## 2023-05-25 DIAGNOSIS — R50.81 FEVER IN OTHER DISEASES: ICD-10-CM

## 2023-05-25 PROCEDURE — 0202U NFCT DS 22 TRGT SARS-COV-2: CPT

## 2023-05-25 RX ORDER — ATOMOXETINE 40 MG/1
CAPSULE ORAL
COMMUNITY
Start: 2023-04-27

## 2023-05-25 RX ORDER — AZELASTINE HYDROCHLORIDE 137 UG/1
SPRAY, METERED NASAL
COMMUNITY
Start: 2023-04-18

## 2023-05-25 RX ORDER — EPINEPHRINE 0.3 MG/.3ML
INJECTION SUBCUTANEOUS
COMMUNITY
Start: 2023-04-18

## 2023-05-25 NOTE — PROGRESS NOTES
"Chief Complaint  Allergies, Rash, Generalized Body Aches, Headache, and Fatigue    Subjective    History of Present Illness      Patient presents to [facility] for   History of Present Illness  Pt is here today due rash on legs/arms, fatigue, bodyaches, headaches and a low grade temp x 6 days.  No fever detected at this time.    Allergies  Associated symptoms include fatigue, headaches and a rash.   Rash  Associated symptoms include fatigue. Her past medical history is significant for allergies.   Headache  Fatigue  Associated symptoms include fatigue, headaches and a rash.      Review of Systems   Constitutional:  Positive for fatigue.   Skin:  Positive for rash.     I have reviewed and agree with the HPI information as above.  Yvette Salazar, APRN     Objective   Vital Signs:   /77   Pulse 73   Temp 98.7 °F (37.1 °C)   Resp 20   Ht 162.6 cm (64\")   Wt 98 kg (216 lb)   SpO2 99%   BMI 37.08 kg/m²     Class 2 Severe Obesity (BMI >=35 and <=39.9). Obesity-related health conditions include the following: GERD. Obesity is improving with lifestyle modifications. BMI is is above average; BMI management plan is completed. We discussed portion control and increasing exercise.      Physical Exam  Vitals and nursing note reviewed.   Constitutional:       Appearance: Normal appearance. She is well-developed.   HENT:      Head: Normocephalic and atraumatic.      Right Ear: Tympanic membrane, ear canal and external ear normal. A PE tube is present.      Left Ear: Tympanic membrane, ear canal and external ear normal. A PE tube is present.      Nose: Nose normal. No septal deviation, nasal tenderness or congestion.      Mouth/Throat:      Lips: Pink. No lesions.      Mouth: Mucous membranes are moist. No oral lesions.      Dentition: Normal dentition.      Pharynx: Oropharynx is clear. No pharyngeal swelling, oropharyngeal exudate or posterior oropharyngeal erythema.   Eyes:      " General: Lids are normal. Vision grossly intact. No scleral icterus.        Right eye: No discharge.         Left eye: No discharge.      Extraocular Movements: Extraocular movements intact.      Conjunctiva/sclera: Conjunctivae normal.      Right eye: Right conjunctiva is not injected.      Left eye: Left conjunctiva is not injected.      Pupils: Pupils are equal, round, and reactive to light.   Neck:      Thyroid: No thyroid mass.      Trachea: Trachea normal.   Cardiovascular:      Rate and Rhythm: Normal rate and regular rhythm.      Heart sounds: Normal heart sounds. No murmur heard.    No gallop.   Pulmonary:      Effort: Pulmonary effort is normal.      Breath sounds: Normal breath sounds and air entry. No wheezing, rhonchi or rales.   Musculoskeletal:         General: No tenderness or deformity. Normal range of motion.      Cervical back: Full passive range of motion without pain, normal range of motion and neck supple.      Thoracic back: Normal.      Right lower leg: No edema.      Left lower leg: No edema.   Skin:     General: Skin is warm and dry.      Coloration: Skin is not jaundiced.      Findings: Rash present. Rash is urticarial.   Neurological:      Mental Status: She is alert and oriented to person, place, and time.      Sensory: Sensation is intact.      Motor: Motor function is intact.      Coordination: Coordination is intact.      Gait: Gait is intact.      Deep Tendon Reflexes: Reflexes are normal and symmetric.   Psychiatric:         Mood and Affect: Mood and affect normal.         Behavior: Behavior normal.         Judgment: Judgment normal.        RAJEEV-7:      PHQ-2 Depression Screening  Little interest or pleasure in doing things? 0-->not at all   Feeling down, depressed, or hopeless? 0-->not at all   PHQ-2 Total Score 0     PHQ-9 Depression Screening  Little interest or pleasure in doing things? 0-->not at all   Feeling down, depressed, or hopeless? 0-->not at all   Trouble falling or  staying asleep, or sleeping too much?     Feeling tired or having little energy?     Poor appetite or overeating?     Feeling bad about yourself - or that you are a failure or have let yourself or your family down?     Trouble concentrating on things, such as reading the newspaper or watching television?     Moving or speaking so slowly that other people could have noticed? Or the opposite - being so fidgety or restless that you have been moving around a lot more than usual?     Thoughts that you would be better off dead, or of hurting yourself in some way?     PHQ-9 Total Score 0   If you checked off any problems, how difficult have these problems made it for you to do your work, take care of things at home, or get along with other people?        Result Review  Data Reviewed:                   Assessment and Plan      Diagnoses and all orders for this visit:    1. Fever in other diseases (Primary)  -     Respiratory Panel PCR w/COVID-19(SARS-CoV-2) [facility] In-House, NP Swab in UTM/VTM, 3-4 HR TAT - Swab, Nasopharynx; Future    2. Allergy, subsequent encounter    Patient was recently started on allergy shots. She had her second dose on Saturday. She has had body aches, fatigue, and fever since them. She also started developing a rash to the generalized body area. She states that it is very itchy, but it is not hive like. Exam is overall unremarkable. She is also having a mild asthma flair.  Plan:  Will get a resp swab today.   Resp panel negative  Advised to follow up with allergy doctor for recommendations.         Follow Up   Return if symptoms worsen or fail to improve.  Patient was given instructions and counseling regarding her condition or for health maintenance advice. Please see specific information pulled into the AVS if appropriate.

## 2023-05-26 ENCOUNTER — TELEPHONE (OUTPATIENT)
Dept: FAMILY MEDICINE CLINIC | Facility: CLINIC | Age: 23
End: 2023-05-26
Payer: MEDICAID

## 2023-06-08 RX ORDER — OFLOXACIN 3 MG/ML
SOLUTION AURICULAR (OTIC)
Qty: 10 ML | Refills: 2 | Status: SHIPPED | OUTPATIENT
Start: 2023-06-08

## 2023-06-22 PROBLEM — R42 DIZZINESS AND GIDDINESS: Status: ACTIVE | Noted: 2019-09-04

## 2023-06-22 PROBLEM — F90.9 ATTENTION DEFICIT HYPERACTIVITY DISORDER: Status: ACTIVE | Noted: 2020-09-18

## 2023-06-22 PROBLEM — Z86.69 HISTORY OF CHRONIC EAR INFECTION: Status: ACTIVE | Noted: 2020-01-07

## 2023-06-22 PROBLEM — J45.909 ASTHMA: Status: ACTIVE | Noted: 2019-09-04

## 2023-06-22 PROBLEM — H90.3 BILATERAL SENSORINEURAL HEARING LOSS: Status: ACTIVE | Noted: 2022-02-24

## 2023-06-22 PROBLEM — F40.11 GENERALIZED SOCIAL PHOBIA: Status: ACTIVE | Noted: 2018-07-02

## 2023-06-22 PROBLEM — F32.A DEPRESSIVE DISORDER: Status: ACTIVE | Noted: 2017-08-23

## 2023-06-22 PROBLEM — E16.2 HYPOGLYCEMIA: Status: ACTIVE | Noted: 2018-07-02

## 2023-06-22 PROBLEM — K21.9 GASTROESOPHAGEAL REFLUX DISEASE: Status: ACTIVE | Noted: 2020-09-18

## 2023-06-22 PROBLEM — L98.9 DISORDER OF SKIN OR SUBCUTANEOUS TISSUE: Status: ACTIVE | Noted: 2018-06-19

## 2023-06-22 PROBLEM — H93.90 DISORDER OF EAR: Status: ACTIVE | Noted: 2020-01-07

## 2023-06-22 PROBLEM — F41.1 GENERALIZED ANXIETY DISORDER: Status: ACTIVE | Noted: 2017-08-23

## 2023-06-22 PROBLEM — M26.623 BILATERAL TEMPOROMANDIBULAR JOINT PAIN: Status: ACTIVE | Noted: 2022-02-24

## 2023-06-22 PROBLEM — E66.9 OBESITY WITH BODY MASS INDEX 30 OR GREATER: Status: ACTIVE | Noted: 2018-07-02

## 2023-09-12 ENCOUNTER — LAB (OUTPATIENT)
Dept: LAB | Facility: HOSPITAL | Age: 23
End: 2023-09-12
Payer: MEDICAID

## 2023-09-12 ENCOUNTER — OFFICE VISIT (OUTPATIENT)
Dept: FAMILY MEDICINE CLINIC | Facility: CLINIC | Age: 23
End: 2023-09-12
Payer: MEDICAID

## 2023-09-12 VITALS
WEIGHT: 216 LBS | RESPIRATION RATE: 18 BRPM | SYSTOLIC BLOOD PRESSURE: 119 MMHG | DIASTOLIC BLOOD PRESSURE: 78 MMHG | HEART RATE: 77 BPM | BODY MASS INDEX: 36.88 KG/M2 | HEIGHT: 64 IN

## 2023-09-12 DIAGNOSIS — R50.9 FEVER, UNSPECIFIED FEVER CAUSE: Primary | ICD-10-CM

## 2023-09-12 DIAGNOSIS — R50.9 FEVER, UNSPECIFIED FEVER CAUSE: ICD-10-CM

## 2023-09-12 DIAGNOSIS — J02.8 ACUTE PHARYNGITIS DUE TO OTHER SPECIFIED ORGANISMS: ICD-10-CM

## 2023-09-12 LAB
S PYO AG THROAT QL: NEGATIVE
SARS-COV-2 RNA RESP QL NAA+PROBE: NOT DETECTED

## 2023-09-12 PROCEDURE — 87081 CULTURE SCREEN ONLY: CPT

## 2023-09-12 PROCEDURE — 87880 STREP A ASSAY W/OPTIC: CPT

## 2023-09-12 PROCEDURE — 87635 SARS-COV-2 COVID-19 AMP PRB: CPT

## 2023-09-12 PROCEDURE — 99213 OFFICE O/P EST LOW 20 MIN: CPT | Performed by: NURSE PRACTITIONER

## 2023-09-12 RX ORDER — METHYLPREDNISOLONE 4 MG/1
TABLET ORAL
Qty: 1 EACH | Refills: 0 | Status: SHIPPED | OUTPATIENT
Start: 2023-09-12

## 2023-09-12 RX ORDER — CLINDAMYCIN HYDROCHLORIDE 300 MG/1
300 CAPSULE ORAL 2 TIMES DAILY
Qty: 20 CAPSULE | Refills: 0 | Status: SHIPPED | OUTPATIENT
Start: 2023-09-12

## 2023-09-12 NOTE — PROGRESS NOTES
"Chief Complaint  Sore Throat and Generalized Body Aches    Subjective    History of Present Illness      Patient presents to [facility] for   History of Present Illness  Pt c/o body aches and sore throat. Pt reports these s/s started a few days ago. Pt has tried otc medication with no relief. Pt reports low grade fevers at home. Current temp 98.2     Review of Systems    I have reviewed and agree with the HPI information as above.  Yvette Salazar, APRN     Objective   Vital Signs:   /78   Pulse 77   Resp 18   Ht 162.6 cm (64\")   Wt 98 kg (216 lb)   BMI 37.08 kg/m²            Physical Exam  Vitals and nursing note reviewed.   Constitutional:       Appearance: Normal appearance. She is well-developed.   HENT:      Head: Normocephalic and atraumatic.      Right Ear: Tympanic membrane, ear canal and external ear normal.      Left Ear: Tympanic membrane, ear canal and external ear normal.      Nose: Nose normal. Congestion present. No septal deviation or nasal tenderness.      Mouth/Throat:      Lips: Pink. No lesions.      Mouth: Mucous membranes are moist. No oral lesions.      Dentition: Normal dentition.      Pharynx: Oropharynx is clear. Posterior oropharyngeal erythema present. No pharyngeal swelling or oropharyngeal exudate.      Tonsils: Tonsillar exudate present. 2+ on the right. 2+ on the left.   Eyes:      General: Lids are normal. Vision grossly intact. No scleral icterus.        Right eye: No discharge.         Left eye: No discharge.      Extraocular Movements: Extraocular movements intact.      Conjunctiva/sclera: Conjunctivae normal.      Right eye: Right conjunctiva is not injected.      Left eye: Left conjunctiva is not injected.      Pupils: Pupils are equal, round, and reactive to light.   Neck:      Thyroid: No thyroid mass.      Trachea: Trachea normal.   Cardiovascular:      Rate and Rhythm: Normal rate and regular rhythm.      Heart sounds: Normal " heart sounds. No murmur heard.    No gallop.   Pulmonary:      Effort: Pulmonary effort is normal.      Breath sounds: Normal breath sounds and air entry. No wheezing, rhonchi or rales.   Musculoskeletal:         General: No tenderness or deformity. Normal range of motion.      Cervical back: Full passive range of motion without pain, normal range of motion and neck supple.      Thoracic back: Normal.      Right lower leg: No edema.      Left lower leg: No edema.   Skin:     General: Skin is warm and dry.      Coloration: Skin is not jaundiced.      Findings: No rash.   Neurological:      Mental Status: She is alert and oriented to person, place, and time.      Sensory: Sensation is intact.      Motor: Motor function is intact.      Coordination: Coordination is intact.      Gait: Gait is intact.      Deep Tendon Reflexes: Reflexes are normal and symmetric.   Psychiatric:         Mood and Affect: Mood and affect normal.         Behavior: Behavior normal.         Judgment: Judgment normal.        RAJEEV-7:      PHQ-2 Depression Screening  Little interest or pleasure in doing things?     Feeling down, depressed, or hopeless?     PHQ-2 Total Score       PHQ-9 Depression Screening  Little interest or pleasure in doing things?     Feeling down, depressed, or hopeless?     Trouble falling or staying asleep, or sleeping too much?     Feeling tired or having little energy?     Poor appetite or overeating?     Feeling bad about yourself - or that you are a failure or have let yourself or your family down?     Trouble concentrating on things, such as reading the newspaper or watching television?     Moving or speaking so slowly that other people could have noticed? Or the opposite - being so fidgety or restless that you have been moving around a lot more than usual?     Thoughts that you would be better off dead, or of hurting yourself in some way?     PHQ-9 Total Score     If you checked off any problems, how difficult have  these problems made it for you to do your work, take care of things at home, or get along with other people?        Result Review  Data Reviewed:                   Assessment and Plan      Diagnoses and all orders for this visit:    1. Fever, unspecified fever cause (Primary)  -     Rapid Strep A Screen - Swab, Throat; Future  -     COVID-19,[facility] IN-HOUSE,Nasal Swab No Transport Media 3-4 HR TAT - Swab, Nasal Cavity; Future    2. Acute pharyngitis due to other specified organisms  -     clindamycin (Cleocin) 300 MG capsule; Take 1 capsule by mouth 2 (Two) Times a Day.  Dispense: 20 capsule; Refill: 0  -     methylPREDNISolone (MEDROL) 4 MG dose pack; Take as directed on package instructions.  Dispense: 1 each; Refill: 0    Patient has not felt well. Running a low grade fever, sore throat, and fatigue.   COVID and Strep were negative.   Will treat as above.           Follow Up   No follow-ups on file.  Patient was given instructions and counseling regarding her condition or for health maintenance advice. Please see specific information pulled into the AVS if appropriate.

## 2023-09-14 LAB — BACTERIA SPEC AEROBE CULT: NORMAL

## 2023-10-11 RX ORDER — OFLOXACIN 3 MG/ML
SOLUTION AURICULAR (OTIC)
Qty: 10 ML | Refills: 2 | OUTPATIENT
Start: 2023-10-11

## 2023-10-12 RX ORDER — OFLOXACIN 3 MG/ML
SOLUTION AURICULAR (OTIC)
Qty: 10 ML | Refills: 2 | OUTPATIENT
Start: 2023-10-12

## 2023-10-24 ENCOUNTER — TELEMEDICINE (OUTPATIENT)
Dept: FAMILY MEDICINE CLINIC | Facility: CLINIC | Age: 23
End: 2023-10-24
Payer: MEDICAID

## 2023-10-24 VITALS — HEIGHT: 64 IN | WEIGHT: 216 LBS | BODY MASS INDEX: 36.88 KG/M2

## 2023-10-24 DIAGNOSIS — R19.7 DIARRHEA, UNSPECIFIED TYPE: ICD-10-CM

## 2023-10-24 DIAGNOSIS — R10.9 ABDOMINAL PAIN, UNSPECIFIED ABDOMINAL LOCATION: ICD-10-CM

## 2023-10-24 DIAGNOSIS — R11.2 NAUSEA AND VOMITING, UNSPECIFIED VOMITING TYPE: ICD-10-CM

## 2023-10-24 DIAGNOSIS — G43.509 PERSISTENT MIGRAINE AURA WITHOUT CEREBRAL INFARCTION AND WITHOUT STATUS MIGRAINOSUS, NOT INTRACTABLE: Primary | ICD-10-CM

## 2023-10-24 PROCEDURE — 1160F RVW MEDS BY RX/DR IN RCRD: CPT | Performed by: NURSE PRACTITIONER

## 2023-10-24 PROCEDURE — 99214 OFFICE O/P EST MOD 30 MIN: CPT | Performed by: NURSE PRACTITIONER

## 2023-10-24 PROCEDURE — 1159F MED LIST DOCD IN RCRD: CPT | Performed by: NURSE PRACTITIONER

## 2023-10-24 RX ORDER — ATOGEPANT 60 MG/1
60 TABLET ORAL DAILY
Qty: 30 TABLET | Refills: 1 | Status: SHIPPED | OUTPATIENT
Start: 2023-10-24 | End: 2023-10-24 | Stop reason: SDUPTHER

## 2023-10-24 RX ORDER — ATOGEPANT 60 MG/1
60 TABLET ORAL DAILY
Qty: 30 TABLET | Refills: 1 | Status: SHIPPED | OUTPATIENT
Start: 2023-10-24

## 2023-10-24 NOTE — PROGRESS NOTES
"You have chosen to receive care through a telehealth visit.  Do you consent to use a video/audio connection for your medical care today? Yes   This visit was completed by Telehealth using a computer. The location of the provider is [address] [place] [address]. The location of the patient is currently at work.    Chief Complaint  Abdominal Pain and Headache    Subjective    History of Present Illness      Patient presents to [facility] for   History of Present Illness  For the past month she has had an upset stomach. No matter what she eats she will throw up along with diarrhea. She is constantly nauseated. Patient does not have a gallbladder.   Patient is having constant daily headaches. She has been taking tyelnol and excedrin migraine. Patient has tried and failed imitrex for abortive migraines, but did not get much relief. She has never tried anything for preventative migraines.        Objective   Vital Signs:   Ht 162.6 cm (64\")   Wt 98 kg (216 lb)   BMI 37.08 kg/m²       Physical Exam  Vitals and nursing note reviewed.   Constitutional:       Appearance: Normal appearance. She is well-developed.   HENT:      Head: Normocephalic and atraumatic.      Mouth/Throat:      Lips: Pink. No lesions.   Eyes:      General: Lids are normal. Vision grossly intact.      Conjunctiva/sclera: Conjunctivae normal.      Right eye: Right conjunctiva is not injected.      Left eye: Left conjunctiva is not injected.   Pulmonary:      Effort: Pulmonary effort is normal.   Musculoskeletal:         General: Normal range of motion.      Cervical back: Full passive range of motion without pain, normal range of motion and neck supple.   Skin:     General: Skin is dry.   Neurological:      Mental Status: She is alert and oriented to person, place, and time.      Motor: Motor function is intact.   Psychiatric:         Mood and Affect: Mood and affect normal.         Judgment: Judgment normal.         "     Result Review  Data Reviewed:                   Assessment and Plan    Problem List Items Addressed This Visit    None  Visit Diagnoses       Persistent migraine aura without cerebral infarction and without status migrainosus, not intractable    -  Primary    Relevant Medications    Atogepant (Qulipta) 60 MG tablet    ubrogepant (Ubrelvy) 100 MG tablet    Abdominal pain, unspecified abdominal location        Relevant Orders    CBC Auto Differential    Comprehensive Metabolic Panel    Amylase    C-reactive Protein    CT Abdomen Pelvis Without Contrast    Sedimentation Rate    Lipase    Urinalysis With Culture If Indicated -    Nausea and vomiting, unspecified vomiting type        Relevant Orders    CBC Auto Differential    Comprehensive Metabolic Panel    Amylase    C-reactive Protein    CT Abdomen Pelvis Without Contrast    Sedimentation Rate    Lipase    Urinalysis With Culture If Indicated -    Diarrhea, unspecified type        Relevant Orders    CBC Auto Differential    Comprehensive Metabolic Panel    Amylase    C-reactive Protein    CT Abdomen Pelvis Without Contrast    Sedimentation Rate    Lipase    Urinalysis With Culture If Indicated -          For the past month she has had an upset stomach. No matter what she eats she will throw up along with diarrhea. She is constantly nauseated. Patient does not have a gallbladder.   Patient is having constant daily headaches. She has been taking tyelnol and excedrin migraine. Patient has tried and failed imitrex for abortive migraines, but did not get much relief. She has never tried anything for preventative migraines.   Plan:  Order labs and CT abdomen  Start Qulipta 60 mg  Ubrevly PRN migraine        Follow Up   Return in about 3 months (around 1/24/2024) for Recheck.  Patient was given instructions and counseling regarding her condition or for health maintenance advice. Please see specific information pulled into the AVS if appropriate.

## 2023-10-26 ENCOUNTER — TELEPHONE (OUTPATIENT)
Dept: FAMILY MEDICINE CLINIC | Facility: CLINIC | Age: 23
End: 2023-10-26
Payer: MEDICAID

## 2023-10-26 NOTE — TELEPHONE ENCOUNTER
Received fax request to submit pa for pt's ubrelvy. Pt's insurance has quantity limit of #10 per 30 days. Routing to provider.

## 2023-10-27 NOTE — TELEPHONE ENCOUNTER
Submitted pa via covermymeds for pt's ubrelvy. Received approval for 10/26/23-1/25/24. Sent pt Xue[employer] message informing.

## 2023-10-31 ENCOUNTER — TELEPHONE (OUTPATIENT)
Dept: FAMILY MEDICINE CLINIC | Facility: CLINIC | Age: 23
End: 2023-10-31
Payer: MEDICAID

## 2023-10-31 ENCOUNTER — HOSPITAL ENCOUNTER (OUTPATIENT)
Dept: CT IMAGING | Facility: HOSPITAL | Age: 23
Discharge: HOME | End: 2023-10-31
Admitting: NURSE PRACTITIONER
Payer: MEDICAID

## 2023-10-31 ENCOUNTER — LAB (OUTPATIENT)
Dept: LAB | Facility: HOSPITAL | Age: 23
End: 2023-10-31
Payer: MEDICAID

## 2023-10-31 DIAGNOSIS — Z11.59 ENCOUNTER FOR HEPATITIS C SCREENING TEST FOR LOW RISK PATIENT: ICD-10-CM

## 2023-10-31 DIAGNOSIS — R10.9 ABDOMINAL PAIN, UNSPECIFIED ABDOMINAL LOCATION: Primary | ICD-10-CM

## 2023-10-31 DIAGNOSIS — R10.9 ABDOMINAL PAIN, UNSPECIFIED ABDOMINAL LOCATION: ICD-10-CM

## 2023-10-31 DIAGNOSIS — R19.7 DIARRHEA, UNSPECIFIED TYPE: ICD-10-CM

## 2023-10-31 DIAGNOSIS — R11.2 NAUSEA AND VOMITING, UNSPECIFIED VOMITING TYPE: ICD-10-CM

## 2023-10-31 LAB
ALBUMIN SERPL-MCNC: 4.3 G/DL (ref 3.5–5)
ALBUMIN/GLOB SERPL: 1.3 G/DL (ref 1.1–2.5)
ALP SERPL-CCNC: 81 U/L (ref 24–120)
ALT SERPL W P-5'-P-CCNC: 28 U/L (ref 0–35)
AMYLASE SERPL-CCNC: 54 U/L (ref 30–110)
ANION GAP SERPL CALCULATED.3IONS-SCNC: 7 MMOL/L (ref 4–13)
AST SERPL-CCNC: 30 U/L (ref 7–45)
AUTO MIXED CELLS #: 0.5 10*3/MM3 (ref 0.1–2.6)
AUTO MIXED CELLS %: 9.3 % (ref 0.1–24)
BILIRUB SERPL-MCNC: 0.6 MG/DL (ref 0.1–1)
BILIRUB UR QL STRIP: NEGATIVE
BUN SERPL-MCNC: 9 MG/DL (ref 5–21)
BUN/CREAT SERPL: 11.7
CALCIUM SPEC-SCNC: 9.9 MG/DL (ref 8.4–10.4)
CHLORIDE SERPL-SCNC: 104 MMOL/L (ref 98–110)
CLARITY UR: CLEAR
CO2 SERPL-SCNC: 27 MMOL/L (ref 24–31)
COLOR UR: YELLOW
CREAT SERPL-MCNC: 0.77 MG/DL (ref 0.5–1.4)
EGFRCR SERPLBLD CKD-EPI 2021: 109.9 ML/MIN/1.73
ERYTHROCYTE [DISTWIDTH] IN BLOOD BY AUTOMATED COUNT: 12.7 % (ref 12.3–15.4)
GLOBULIN UR ELPH-MCNC: 3.3 GM/DL
GLUCOSE SERPL-MCNC: 92 MG/DL (ref 70–100)
GLUCOSE UR STRIP-MCNC: NEGATIVE MG/DL
HCT VFR BLD AUTO: 39.9 % (ref 34–46.6)
HGB BLD-MCNC: 13.7 G/DL (ref 12–15.9)
HGB UR QL STRIP.AUTO: NEGATIVE
KETONES UR QL STRIP: NEGATIVE
LEUKOCYTE ESTERASE UR QL STRIP.AUTO: NEGATIVE
LIPASE SERPL-CCNC: 56 U/L (ref 23–203)
LYMPHOCYTES # BLD AUTO: 1.5 10*3/MM3 (ref 0.7–3.1)
LYMPHOCYTES NFR BLD AUTO: 25.9 % (ref 19.6–45.3)
MCH RBC QN AUTO: 31.3 PG (ref 26.6–33)
MCHC RBC AUTO-ENTMCNC: 34.3 G/DL (ref 31.5–35.7)
MCV RBC AUTO: 91.1 FL (ref 79–97)
NEUTROPHILS NFR BLD AUTO: 3.8 10*3/MM3 (ref 1.7–7)
NEUTROPHILS NFR BLD AUTO: 64.8 % (ref 42.7–76)
NITRITE UR QL STRIP: NEGATIVE
PH UR STRIP.AUTO: 6 [PH] (ref 5–8)
PLATELET # BLD AUTO: 298 10*3/MM3 (ref 140–450)
PMV BLD AUTO: 7.7 FL (ref 6–12)
POTASSIUM SERPL-SCNC: 3.9 MMOL/L (ref 3.5–5.3)
PROT SERPL-MCNC: 7.6 G/DL (ref 6.3–8.7)
PROT UR QL STRIP: ABNORMAL
RBC # BLD AUTO: 4.38 10*6/MM3 (ref 3.77–5.28)
SODIUM SERPL-SCNC: 138 MMOL/L (ref 135–145)
SP GR UR STRIP: 1.01 (ref 1–1.03)
UROBILINOGEN UR QL STRIP: ABNORMAL
WBC NRBC COR # BLD: 5.8 10*3/MM3 (ref 3.4–10.8)

## 2023-10-31 PROCEDURE — 81003 URINALYSIS AUTO W/O SCOPE: CPT | Performed by: NURSE PRACTITIONER

## 2023-10-31 PROCEDURE — 82150 ASSAY OF AMYLASE: CPT

## 2023-10-31 PROCEDURE — 80053 COMPREHEN METABOLIC PANEL: CPT

## 2023-10-31 PROCEDURE — 86140 C-REACTIVE PROTEIN: CPT

## 2023-10-31 PROCEDURE — 83690 ASSAY OF LIPASE: CPT

## 2023-10-31 PROCEDURE — 85652 RBC SED RATE AUTOMATED: CPT

## 2023-10-31 PROCEDURE — 85025 COMPLETE CBC W/AUTO DIFF WBC: CPT

## 2023-10-31 PROCEDURE — 36415 COLL VENOUS BLD VENIPUNCTURE: CPT

## 2023-10-31 PROCEDURE — 86803 HEPATITIS C AB TEST: CPT

## 2023-10-31 PROCEDURE — 74176 CT ABD & PELVIS W/O CONTRAST: CPT

## 2023-10-31 NOTE — TELEPHONE ENCOUNTER
Pt came in to office requesting clarification on her Ubrelvy and Qulipta prescriptions. She stated the pharmacy is telling her that her PA has not gone through for either med. According to our records, the Ubrelvy was approved. Called pharmacy to inquire; changed the dispense quantity to 10 so insurance would cover. Also inquired with pharmacy if Qulipta needs a PA. They confirmed that it does. MARY Villalba, informed. Sent pt [employer] message informing.

## 2023-11-01 LAB
CRP SERPL-MCNC: <0.3 MG/DL (ref 0–0.5)
ERYTHROCYTE [SEDIMENTATION RATE] IN BLOOD: 7 MM/HR (ref 0–20)
HCV AB SER DONR QL: NORMAL

## 2023-11-03 RX ORDER — OFLOXACIN 3 MG/ML
SOLUTION AURICULAR (OTIC)
Qty: 10 ML | Refills: 2 | OUTPATIENT
Start: 2023-11-03

## 2023-11-06 NOTE — TELEPHONE ENCOUNTER
Received fax request to submit further information for pt's qulipta pa. Completed and faxed back. Awaiting further.

## 2023-11-15 DIAGNOSIS — G43.509 PERSISTENT MIGRAINE AURA WITHOUT CEREBRAL INFARCTION AND WITHOUT STATUS MIGRAINOSUS, NOT INTRACTABLE: Primary | ICD-10-CM

## 2023-11-15 RX ORDER — GALCANEZUMAB 120 MG/ML
240 INJECTION, SOLUTION SUBCUTANEOUS ONCE
Qty: 2 ML | Refills: 0 | Status: SHIPPED | OUTPATIENT
Start: 2023-11-15 | End: 2023-11-15

## 2023-11-15 RX ORDER — GALCANEZUMAB 120 MG/ML
120 INJECTION, SOLUTION SUBCUTANEOUS
Qty: 1.12 ML | Refills: 5 | Status: SHIPPED | OUTPATIENT
Start: 2023-12-15

## 2023-11-15 NOTE — TELEPHONE ENCOUNTER
Insurance had denied the Qulipta. Approved medications are Emgality. Will send a script at this time.

## 2023-11-16 ENCOUNTER — PATIENT MESSAGE (OUTPATIENT)
Dept: FAMILY MEDICINE CLINIC | Facility: CLINIC | Age: 23
End: 2023-11-16
Payer: MEDICAID

## 2023-11-22 ENCOUNTER — OFFICE VISIT (OUTPATIENT)
Dept: GASTROENTEROLOGY | Age: 23
End: 2023-11-22
Payer: MEDICAID

## 2023-11-22 VITALS
BODY MASS INDEX: 36.54 KG/M2 | HEART RATE: 99 BPM | HEIGHT: 64 IN | OXYGEN SATURATION: 98 % | WEIGHT: 214 LBS | DIASTOLIC BLOOD PRESSURE: 80 MMHG | SYSTOLIC BLOOD PRESSURE: 120 MMHG

## 2023-11-22 DIAGNOSIS — R11.2 NAUSEA AND VOMITING, UNSPECIFIED VOMITING TYPE: ICD-10-CM

## 2023-11-22 DIAGNOSIS — R10.13 EPIGASTRIC PAIN: Primary | ICD-10-CM

## 2023-11-22 PROCEDURE — 99204 OFFICE O/P NEW MOD 45 MIN: CPT | Performed by: NURSE PRACTITIONER

## 2023-11-22 RX ORDER — UBROGEPANT 100 MG/1
TABLET ORAL PRN
COMMUNITY

## 2023-11-22 RX ORDER — SUCRALFATE 1 G/1
1 TABLET ORAL 4 TIMES DAILY
Qty: 120 TABLET | Refills: 3 | Status: SHIPPED | OUTPATIENT
Start: 2023-11-22

## 2023-11-22 RX ORDER — PANTOPRAZOLE SODIUM 40 MG/1
40 TABLET, DELAYED RELEASE ORAL
Qty: 30 TABLET | Refills: 11 | Status: SHIPPED | OUTPATIENT
Start: 2023-11-22

## 2023-11-22 RX ORDER — SERTRALINE HYDROCHLORIDE 150 MG/1
150 CAPSULE ORAL DAILY
COMMUNITY

## 2023-11-22 RX ORDER — GALCANEZUMAB 120 MG/ML
120 INJECTION, SOLUTION SUBCUTANEOUS
COMMUNITY

## 2023-11-22 NOTE — PROGRESS NOTES
General: Skin is warm and dry. Coloration: Skin is not pale. Neurological:      Mental Status: She is alert and oriented to person, place, and time.    Psychiatric:         Behavior: Behavior normal.

## 2023-11-25 ASSESSMENT — ENCOUNTER SYMPTOMS
ABDOMINAL DISTENTION: 0
ABDOMINAL PAIN: 1
COUGH: 0
SHORTNESS OF BREATH: 0
ANAL BLEEDING: 0
BLOOD IN STOOL: 0
TROUBLE SWALLOWING: 0
CONSTIPATION: 0
CHOKING: 0
RECTAL PAIN: 0
VOMITING: 1
NAUSEA: 1
DIARRHEA: 0

## 2023-11-29 ENCOUNTER — OFFICE VISIT (OUTPATIENT)
Dept: FAMILY MEDICINE CLINIC | Facility: CLINIC | Age: 23
End: 2023-11-29
Payer: MEDICAID

## 2023-11-29 ENCOUNTER — LAB (OUTPATIENT)
Dept: LAB | Facility: HOSPITAL | Age: 23
End: 2023-11-29
Payer: MEDICAID

## 2023-11-29 VITALS
HEIGHT: 64 IN | WEIGHT: 221 LBS | OXYGEN SATURATION: 99 % | HEART RATE: 81 BPM | BODY MASS INDEX: 37.73 KG/M2 | DIASTOLIC BLOOD PRESSURE: 78 MMHG | SYSTOLIC BLOOD PRESSURE: 113 MMHG

## 2023-11-29 DIAGNOSIS — R06.2 WHEEZING: Primary | ICD-10-CM

## 2023-11-29 DIAGNOSIS — J45.41 MODERATE PERSISTENT ASTHMA WITH ACUTE EXACERBATION: ICD-10-CM

## 2023-11-29 DIAGNOSIS — R06.2 WHEEZING: ICD-10-CM

## 2023-11-29 PROCEDURE — 0202U NFCT DS 22 TRGT SARS-COV-2: CPT

## 2023-11-29 PROCEDURE — 1160F RVW MEDS BY RX/DR IN RCRD: CPT | Performed by: NURSE PRACTITIONER

## 2023-11-29 PROCEDURE — 99213 OFFICE O/P EST LOW 20 MIN: CPT | Performed by: NURSE PRACTITIONER

## 2023-11-29 PROCEDURE — 1159F MED LIST DOCD IN RCRD: CPT | Performed by: NURSE PRACTITIONER

## 2023-11-29 RX ORDER — DOXYCYCLINE HYCLATE 100 MG/1
100 CAPSULE ORAL 2 TIMES DAILY
Qty: 20 CAPSULE | Refills: 0 | Status: SHIPPED | OUTPATIENT
Start: 2023-11-29

## 2023-11-29 RX ORDER — PANTOPRAZOLE SODIUM 40 MG/1
40 TABLET, DELAYED RELEASE ORAL
COMMUNITY
Start: 2023-11-22

## 2023-11-29 RX ORDER — DEXAMETHASONE SODIUM PHOSPHATE 4 MG/ML
8 INJECTION, SOLUTION INTRA-ARTICULAR; INTRALESIONAL; INTRAMUSCULAR; INTRAVENOUS; SOFT TISSUE ONCE
Status: SHIPPED | OUTPATIENT
Start: 2023-11-29

## 2023-11-29 NOTE — PROGRESS NOTES
"Chief Complaint  Cough, URI, Headache, Dizziness, Shortness of Breath, Earache, and Generalized Body Aches    Subjective    History of Present Illness      Patient presents to [facility] for   History of Present Illness  Pt presents today with bilateral earache, shortness of breath, bodyaches, dizziness, cough, congestion and headache. Ongoing since 11/28, tried taking nyquil to relief some symptoms       Review of Systems    I have reviewed and agree with the HPI information as above.  Yvette Salazar, APRN     Objective   Vital Signs:   /78   Pulse 81   Ht 162.6 cm (64.02\")   Wt 100 kg (221 lb)   SpO2 99%   BMI 37.92 kg/m²            Physical Exam  Vitals and nursing note reviewed.   Constitutional:       Appearance: Normal appearance.   HENT:      Head: Normocephalic and atraumatic.      Right Ear: Tympanic membrane is erythematous and bulging.      Left Ear: Tympanic membrane is erythematous and bulging.      Nose: Congestion present.      Mouth/Throat:      Pharynx: Posterior oropharyngeal erythema present.   Cardiovascular:      Rate and Rhythm: Normal rate and regular rhythm.      Pulses: Normal pulses.      Heart sounds: Normal heart sounds.   Pulmonary:      Effort: Pulmonary effort is normal.      Breath sounds: Wheezing present.   Musculoskeletal:         General: Normal range of motion.      Cervical back: Normal range of motion and neck supple.   Skin:     General: Skin is warm and dry.   Neurological:      General: No focal deficit present.      Mental Status: She is alert and oriented to person, place, and time.   Psychiatric:         Mood and Affect: Mood normal.         Behavior: Behavior normal.             Result Review  Data Reviewed:                   Assessment and Plan      Diagnoses and all orders for this visit:    1. Wheezing (Primary)  -     Respiratory Panel PCR w/COVID-19(SARS-CoV-2) [facility]UAN MIGUEL/[facility] In-House, NP Swab in UTM/VTM, 2 HR " TAT - Swab, Nasopharynx; Future    2. Moderate persistent asthma with acute exacerbation  -     dexAMETHasone (DECADRON) injection 8 mg  -     doxycycline (VIBRAMYCIN) 100 MG capsule; Take 1 capsule by mouth 2 (Two) Times a Day.  Dispense: 20 capsule; Refill: 0    Patient presents today with not feeling well, cough, exacerbation of her asthma, and congestion.  She does work in a  and RSV has been going around.  Patient denies fever at this time.  Will get a respiratory swab at this time and call patient with results tomorrow.  Will also treat as above.        Follow Up   Return if symptoms worsen or fail to improve.  Patient was given instructions and counseling regarding her condition or for health maintenance advice. Please see specific information pulled into the AVS if appropriate.

## 2023-11-30 ENCOUNTER — TELEPHONE (OUTPATIENT)
Dept: FAMILY MEDICINE CLINIC | Facility: CLINIC | Age: 23
End: 2023-11-30
Payer: MEDICAID

## 2023-11-30 LAB
B PARAPERT DNA SPEC QL NAA+PROBE: NOT DETECTED
B PERT DNA SPEC QL NAA+PROBE: NOT DETECTED
C PNEUM DNA NPH QL NAA+NON-PROBE: NOT DETECTED
FLUAV SUBTYP SPEC NAA+PROBE: NOT DETECTED
FLUBV RNA ISLT QL NAA+PROBE: NOT DETECTED
HADV DNA SPEC NAA+PROBE: NOT DETECTED
HCOV 229E RNA SPEC QL NAA+PROBE: NOT DETECTED
HCOV HKU1 RNA SPEC QL NAA+PROBE: NOT DETECTED
HCOV NL63 RNA SPEC QL NAA+PROBE: NOT DETECTED
HCOV OC43 RNA SPEC QL NAA+PROBE: NOT DETECTED
HMPV RNA NPH QL NAA+NON-PROBE: NOT DETECTED
HPIV1 RNA ISLT QL NAA+PROBE: NOT DETECTED
HPIV2 RNA SPEC QL NAA+PROBE: NOT DETECTED
HPIV3 RNA NPH QL NAA+PROBE: NOT DETECTED
HPIV4 P GENE NPH QL NAA+PROBE: NOT DETECTED
M PNEUMO IGG SER IA-ACNC: NOT DETECTED
RHINOVIRUS RNA SPEC NAA+PROBE: DETECTED
RSV RNA NPH QL NAA+NON-PROBE: NOT DETECTED
SARS-COV-2 RNA NPH QL NAA+NON-PROBE: NOT DETECTED

## 2023-11-30 NOTE — TELEPHONE ENCOUNTER
----- Message from KRISTEN Pickett sent at 11/30/2023  3:17 PM CST -----  Positive for rhino virus ( common cold)

## 2023-12-08 ENCOUNTER — TELEPHONE (OUTPATIENT)
Dept: GASTROENTEROLOGY | Age: 23
End: 2023-12-08

## 2023-12-13 ENCOUNTER — ANESTHESIA EVENT (OUTPATIENT)
Dept: OPERATING ROOM | Age: 23
End: 2023-12-13

## 2023-12-13 ENCOUNTER — APPOINTMENT (OUTPATIENT)
Dept: OPERATING ROOM | Age: 23
End: 2023-12-13
Attending: INTERNAL MEDICINE

## 2023-12-13 ENCOUNTER — HOSPITAL ENCOUNTER (OUTPATIENT)
Age: 23
Setting detail: SPECIMEN
Discharge: HOME | End: 2023-12-13
Payer: MEDICAID

## 2023-12-13 ENCOUNTER — ANESTHESIA (OUTPATIENT)
Dept: OPERATING ROOM | Age: 23
End: 2023-12-13

## 2023-12-13 ENCOUNTER — HOSPITAL ENCOUNTER (OUTPATIENT)
Age: 23
Setting detail: OUTPATIENT SURGERY
Discharge: HOME | End: 2023-12-13
Attending: INTERNAL MEDICINE | Admitting: INTERNAL MEDICINE
Payer: MEDICAID

## 2023-12-13 VITALS
RESPIRATION RATE: 12 BRPM | WEIGHT: 214 LBS | HEART RATE: 62 BPM | BODY MASS INDEX: 36.54 KG/M2 | SYSTOLIC BLOOD PRESSURE: 113 MMHG | OXYGEN SATURATION: 98 % | TEMPERATURE: 97.8 F | DIASTOLIC BLOOD PRESSURE: 67 MMHG | HEIGHT: 64 IN

## 2023-12-13 LAB
CONTROL: NORMAL
PREGNANCY TEST URINE, POC: NEGATIVE

## 2023-12-13 PROCEDURE — G8907 PT DOC NO EVENTS ON DISCHARG: HCPCS

## 2023-12-13 PROCEDURE — 43239 EGD BIOPSY SINGLE/MULTIPLE: CPT | Performed by: INTERNAL MEDICINE

## 2023-12-13 PROCEDURE — G8917 PT W IV AB NOT GIVEN ON TIME: HCPCS

## 2023-12-13 PROCEDURE — 43239 EGD BIOPSY SINGLE/MULTIPLE: CPT

## 2023-12-13 PROCEDURE — 88305 TISSUE EXAM BY PATHOLOGIST: CPT

## 2023-12-13 RX ORDER — FENTANYL CITRATE 50 UG/ML
INJECTION, SOLUTION INTRAMUSCULAR; INTRAVENOUS PRN
Status: DISCONTINUED | OUTPATIENT
Start: 2023-12-13 | End: 2023-12-13 | Stop reason: SDUPTHER

## 2023-12-13 RX ORDER — SODIUM CHLORIDE, SODIUM LACTATE, POTASSIUM CHLORIDE, CALCIUM CHLORIDE 600; 310; 30; 20 MG/100ML; MG/100ML; MG/100ML; MG/100ML
INJECTION, SOLUTION INTRAVENOUS CONTINUOUS
Status: DISCONTINUED | OUTPATIENT
Start: 2023-12-13 | End: 2023-12-13 | Stop reason: HOSPADM

## 2023-12-13 RX ORDER — LIDOCAINE HYDROCHLORIDE 10 MG/ML
INJECTION, SOLUTION INFILTRATION; PERINEURAL PRN
Status: DISCONTINUED | OUTPATIENT
Start: 2023-12-13 | End: 2023-12-13 | Stop reason: SDUPTHER

## 2023-12-13 RX ORDER — PROPOFOL 10 MG/ML
INJECTION, EMULSION INTRAVENOUS CONTINUOUS PRN
Status: DISCONTINUED | OUTPATIENT
Start: 2023-12-13 | End: 2023-12-13 | Stop reason: SDUPTHER

## 2023-12-13 RX ADMIN — FENTANYL CITRATE 50 MCG: 50 INJECTION, SOLUTION INTRAMUSCULAR; INTRAVENOUS at 09:18

## 2023-12-13 RX ADMIN — PROPOFOL 150 MCG/KG/MIN: 10 INJECTION, EMULSION INTRAVENOUS at 09:27

## 2023-12-13 RX ADMIN — LIDOCAINE HYDROCHLORIDE 50 MG: 10 INJECTION, SOLUTION INFILTRATION; PERINEURAL at 09:27

## 2023-12-13 RX ADMIN — SODIUM CHLORIDE, SODIUM LACTATE, POTASSIUM CHLORIDE, CALCIUM CHLORIDE: 600; 310; 30; 20 INJECTION, SOLUTION INTRAVENOUS at 08:13

## 2023-12-13 ASSESSMENT — PAIN - FUNCTIONAL ASSESSMENT
PAIN_FUNCTIONAL_ASSESSMENT: NONE - DENIES PAIN

## 2023-12-13 NOTE — DISCHARGE INSTRUCTIONS
RECOMMENDATIONS:    1. Await path results, the patient will be contacted in 7-10 days with biopsy results. 2.  Small frequent meals with low-fat diet  3.  - Resume previous meds including for empirical therapy of GERD   4. Consider a CT scan of the abdomen and pelvis with and without IV and p.o. contrast if not done previously if her symptoms persist  - GI clinic f/u 6-8 weeks    - Keep scheduled f/u appts with other MDs      - NO ASA/NSAIDs x 2 weeks     POST-OP ORDERS: ENDOSCOPY & COLONOSCOPY:    1. Rest today. 2. DO NOT eat or drink until wide awake; eat your usual diet today in moderate amount only. 3. DO NOT drive today. 4. Call physician if you have severe pain, vomiting, fever, rectal bleeding or black bowel movements. 5.  If a biopsy was taken or a polyp removed, you should expect to hear results in about 7-10 days. If you have heard nothing from your physician by then, call the office for results. 6.  Discharge home when patient awake, vitals signs stable and tolerating liquids. 7. Call with questions or concerns [phone]. NSAIDS Non-steroidal Anti-Inflammatory  You have been directed by your physician to avoid any NSAID's; the following medications are a list of those to avoid. If you think that you are taking any NSAID's notify your physician.    Over The Counter  Advil                      Motrin  Nuprin                   Ibuprofen  Midol                     Aleve  Naproxen              Orudis  Aspirin                   Nhung-Aryan  Prescriptions and Generics  Cataflam              Relafen  Voltaren               Clinoril  Indocin                 Naproxen  Arthrotec              Lodine  Daypro                 Nalfon  Toradol                Ansaid  Feldene               Meclofenamate  Fenoprofen          Ponstel  Mobic                   Celebrex  Vioxx

## 2023-12-13 NOTE — ADDENDUM NOTE
Addendum  created 12/13/23 1500 by GLORIA Smith Se, CRNA    Intraprocedure Meds edited, Orders acknowledged in Narrator

## 2023-12-13 NOTE — ANESTHESIA POSTPROCEDURE EVALUATION
Department of Anesthesiology  Postprocedure Note    Patient: Emilee Rodas  MRN: [id number]  YOB: 2000  Date of evaluation: 12/13/2023    Procedure Summary     Date: 12/13/23 Room / Location: [facility] 02 / [address]    Anesthesia Start: 7349 Anesthesia Stop:     Procedure: EGD BIOPSY (Abdomen) Diagnosis:       Epigastric pain      Nausea and vomiting, unspecified vomiting type      (Epigastric pain [R10.13])      (Nausea and vomiting, unspecified vomiting type [R11.2])    Surgeons: Nancie Alaniz MD Responsible Provider: GLORIA Montgomery CRNA    Anesthesia Type: General, TIVA ASA Status: 2          Anesthesia Type: General, TIVA    Blayne Phase I: Blayne Score: 10    Blayne Phase II:      Anesthesia Post Evaluation    Patient location during evaluation: bedside  Patient participation: complete - patient participated  Level of consciousness: awake  Pain score: 0  Airway patency: patent  Nausea & Vomiting: no nausea and no vomiting  Cardiovascular status: hemodynamically stable  Respiratory status: acceptable  Hydration status: stable  Pain management: adequate        No notable events documented.

## 2023-12-13 NOTE — DISCHARGE INSTR - MEDS
1. Await path results, the patient will be contacted in 7-10 days with biopsy results. 2.  Small frequent meals with low-fat diet  3.  - Resume previous meds including for empirical therapy of GERD   4.   Consider a CT scan of the abdomen and pelvis with and without IV and p.o. contrast if not done previously if her symptoms persist  - GI clinic f/u 6-8 weeks    - Keep scheduled f/u appts with other MDs     - NO ASA/NSAIDs x 2 weeks

## 2023-12-13 NOTE — OP NOTE
Endoscopic Procedure Note    Patient: Daniela Dunlap : 2000  [facility] Rec#: [id number] Acc#: [id number]     Primary Care Provider Damian Mast MD    Endoscopist: Hamida Galicia MD, MD    Date of Procedure:  2023    Procedure:   1. EGD with cold biopsies    Indications:   1. Epigastric pain  2. Nausea and vomiting, unspecified vomiting type     Anesthesia:  Sedation was administered by anesthesia who monitored the patient during the procedure. Estimated Blood Loss: minimal    Procedure:   After reviewing the patient's chart and obtaining informed consent, the patient was placed in the left lateral decubitus position. A forward-viewing Olympus endoscope was lubricated and inserted through the mouth into the oropharynx. Under direct visualization, the upper esophagus was intubated. The scope was advanced to the level of the third portion of duodenum. Scope was slowly withdrawn with careful inspection of the mucosal surfaces. The scope was retroflexed for inspection of the gastric fundus and incisura. Findings and maneuvers are listed in impression below. The patient tolerated the procedure well. The scope was removed. There were no immediate complications. Findings/IMPRESSION:  Esophagus: normal and a normal EG junction at 38 cm. NO erosions or ulcers or nodules or strictures or webs or rings or mass lesions or extrinsic compression or diverticula noted. There is no obvious hiatal hernia present. Stomach:  normal.    NO ulcers or masses or gastric outlet obstruction or retained food or fluid. Rugae were normal and lumen distended well with insufflation. Retroflexed views otherwise revealed a normal GE junction, fundus and cardia as well. Duodenum: Normal including the major duodenal papilla. Random biopsies were taken to check for Celiac disease and other causes of villous atrophy.      No clear-cut etiology to explain the patient's upper GI symptoms were

## 2023-12-28 ENCOUNTER — OFFICE VISIT (OUTPATIENT)
Dept: PRIMARY CARE CLINIC | Age: 23
End: 2023-12-28
Payer: MEDICAID

## 2023-12-28 VITALS
TEMPERATURE: 98.3 F | WEIGHT: 221 LBS | SYSTOLIC BLOOD PRESSURE: 122 MMHG | BODY MASS INDEX: 37.93 KG/M2 | DIASTOLIC BLOOD PRESSURE: 70 MMHG | HEART RATE: 83 BPM | OXYGEN SATURATION: 97 %

## 2023-12-28 DIAGNOSIS — J02.9 SORE THROAT: ICD-10-CM

## 2023-12-28 DIAGNOSIS — J06.9 VIRAL URI: Primary | ICD-10-CM

## 2023-12-28 LAB
INFLUENZA A ANTIBODY: NORMAL
INFLUENZA B ANTIBODY: NORMAL
S PYO AG THROAT QL: NORMAL

## 2023-12-28 PROCEDURE — 99203 OFFICE O/P NEW LOW 30 MIN: CPT | Performed by: NURSE PRACTITIONER

## 2024-01-19 DIAGNOSIS — J45.41 MODERATE PERSISTENT ASTHMA WITH ACUTE EXACERBATION: ICD-10-CM

## 2024-01-19 RX ORDER — DEXTROMETHORPHAN HYDROBROMIDE AND PROMETHAZINE HYDROCHLORIDE 15; 6.25 MG/5ML; MG/5ML
5 SYRUP ORAL 4 TIMES DAILY PRN
Qty: 118 ML | Refills: 0 | Status: SHIPPED | OUTPATIENT
Start: 2024-01-19

## 2024-01-19 RX ORDER — DOXYCYCLINE HYCLATE 100 MG/1
100 CAPSULE ORAL 2 TIMES DAILY
Qty: 20 CAPSULE | Refills: 0 | Status: SHIPPED | OUTPATIENT
Start: 2024-01-19

## 2024-01-19 RX ORDER — METHYLPREDNISOLONE 4 MG/1
TABLET ORAL
Qty: 1 EACH | Refills: 0 | Status: SHIPPED | OUTPATIENT
Start: 2024-01-19

## 2024-01-26 ENCOUNTER — DOCUMENTATION (OUTPATIENT)
Dept: FAMILY MEDICINE CLINIC | Facility: CLINIC | Age: 24
End: 2024-01-26
Payer: MEDICAID

## 2024-01-26 RX ORDER — ALBUTEROL SULFATE 90 UG/1
2 AEROSOL, METERED RESPIRATORY (INHALATION) AS NEEDED
Qty: 18 G | Refills: 3 | Status: SHIPPED | OUTPATIENT
Start: 2024-01-26

## 2024-01-29 ENCOUNTER — HOSPITAL ENCOUNTER (OUTPATIENT)
Dept: GENERAL RADIOLOGY | Age: 24
Discharge: HOME | End: 2024-01-29
Payer: MEDICAID

## 2024-01-29 ENCOUNTER — OFFICE VISIT (OUTPATIENT)
Dept: PRIMARY CARE CLINIC | Age: 24
End: 2024-01-29
Payer: MEDICAID

## 2024-01-29 VITALS
OXYGEN SATURATION: 98 % | TEMPERATURE: 98.6 F | BODY MASS INDEX: 38.11 KG/M2 | DIASTOLIC BLOOD PRESSURE: 76 MMHG | SYSTOLIC BLOOD PRESSURE: 110 MMHG | HEART RATE: 97 BPM | WEIGHT: 222 LBS

## 2024-01-29 DIAGNOSIS — R06.2 WHEEZING: ICD-10-CM

## 2024-01-29 DIAGNOSIS — R05.1 ACUTE COUGH: ICD-10-CM

## 2024-01-29 DIAGNOSIS — J45.901 MODERATE ASTHMA WITH EXACERBATION, UNSPECIFIED WHETHER PERSISTENT: Primary | ICD-10-CM

## 2024-01-29 PROCEDURE — 71046 X-RAY EXAM CHEST 2 VIEWS: CPT

## 2024-01-29 PROCEDURE — 99213 OFFICE O/P EST LOW 20 MIN: CPT | Performed by: NURSE PRACTITIONER

## 2024-01-29 ASSESSMENT — ENCOUNTER SYMPTOMS
SORE THROAT: 0
CHEST TIGHTNESS: 1
CONSTIPATION: 0
SINUS PRESSURE: 0
BLOOD IN STOOL: 0
EYE DISCHARGE: 0
SHORTNESS OF BREATH: 1
RHINORRHEA: 0
DIARRHEA: 0
VOMITING: 0
NAUSEA: 0
COUGH: 1
COLOR CHANGE: 0
WHEEZING: 1
EYE ITCHING: 0
ABDOMINAL PAIN: 0

## 2024-01-29 NOTE — PATIENT INSTRUCTIONS
- Chest xray today; will call with results once received and treat if necessary.  - Use albuterol as needed for wheezing  - Pulmicort as prescribed  - Encouraged OTC mucinex as directed, zyrtec or claritin daily, and flonase twice daily.  - Increase fluid intake  - The patient is to follow up with PCP or return to clinic if symptoms worsen/fail to improve.

## 2024-01-29 NOTE — PROGRESS NOTES
[facility]  [facility] [address]  [address]  [place] [address]  Dept: [phone]  Dept Fax: [phone]  Loc: [phone]    Autumn Burt is a 23 y.o. female who presents today for her medical conditions/complaints as noted below.  Autumn Burt is complaining of Headache, Congestion (X2 weeks, has already finished a antibiotic, steroid pack and steroid shot prescribed by another clinic. Has also been doing her breathing treatments at home), and Cough        HPI:   Cough  This is a new problem. The current episode started 1 to 4 weeks ago (2 weeks ago). The problem has been waxing and waning. The problem occurs every few minutes. The cough is Non-productive. Associated symptoms include headaches, nasal congestion, shortness of breath (mild, intermittent) and wheezing. Pertinent negatives include no chest pain, chills, ear pain, fever, myalgias, rash, rhinorrhea or sore throat. The symptoms are aggravated by lying down. She has tried nothing for the symptoms.   Reports she had flu several weeks ago but then 2 weeks ago, she was diagnosed with a sinus infection and treated with antibiotics, steroids, and a steroid injection and finished medication last week. Cough is still lingering with mild SOA.    Past Medical History:   Diagnosis Date    Anxiety     Asthma     Gastritis        Past Surgical History:   Procedure Laterality Date    CHOLECYSTECTOMY  02/22/2017    ENDOSCOPY, COLON, DIAGNOSTIC      UPPER GASTROINTESTINAL ENDOSCOPY  2017    [facility]- normal per pt's mom    UPPER GASTROINTESTINAL ENDOSCOPY  12/13/2023    UPPER GASTROINTESTINAL ENDOSCOPY N/A 12/13/2023    Dr Eid, (-) Sprue, no lesions to explains symptoms,       Family History   Problem Relation Age of Onset    Rheum Arthritis Mother     Depression Mother     Colon Cancer Maternal Grandmother 69    Dementia Maternal Grandmother     Heart Disease Maternal Grandmother

## 2024-01-30 DIAGNOSIS — J45.41 MODERATE PERSISTENT ASTHMA WITH ACUTE EXACERBATION: Primary | ICD-10-CM

## 2024-01-30 RX ORDER — FLUTICASONE PROPIONATE AND SALMETEROL 250; 50 UG/1; UG/1
1 POWDER RESPIRATORY (INHALATION)
Qty: 60 EACH | Refills: 2 | Status: SHIPPED | OUTPATIENT
Start: 2024-01-30

## 2024-02-01 ENCOUNTER — OFFICE VISIT (OUTPATIENT)
Dept: GASTROENTEROLOGY | Age: 24
End: 2024-02-01
Payer: MEDICAID

## 2024-02-01 VITALS
HEIGHT: 65 IN | HEART RATE: 84 BPM | WEIGHT: 222 LBS | SYSTOLIC BLOOD PRESSURE: 120 MMHG | OXYGEN SATURATION: 98 % | DIASTOLIC BLOOD PRESSURE: 80 MMHG | BODY MASS INDEX: 36.99 KG/M2

## 2024-02-01 DIAGNOSIS — R11.2 NAUSEA AND VOMITING, UNSPECIFIED VOMITING TYPE: ICD-10-CM

## 2024-02-01 DIAGNOSIS — K59.00 CONSTIPATION, UNSPECIFIED CONSTIPATION TYPE: ICD-10-CM

## 2024-02-01 DIAGNOSIS — R10.13 EPIGASTRIC PAIN: Primary | ICD-10-CM

## 2024-02-01 PROCEDURE — 99214 OFFICE O/P EST MOD 30 MIN: CPT | Performed by: NURSE PRACTITIONER

## 2024-02-05 ASSESSMENT — ENCOUNTER SYMPTOMS
RECTAL PAIN: 0
ANAL BLEEDING: 0
DIARRHEA: 0
SHORTNESS OF BREATH: 0
CHOKING: 0
ABDOMINAL PAIN: 0
BLOOD IN STOOL: 0
NAUSEA: 0
VOMITING: 0
ABDOMINAL DISTENTION: 0
COUGH: 0
CONSTIPATION: 1
TROUBLE SWALLOWING: 0

## 2024-02-14 ENCOUNTER — TELEPHONE (OUTPATIENT)
Dept: FAMILY MEDICINE CLINIC | Facility: CLINIC | Age: 24
End: 2024-02-14
Payer: MEDICAID

## 2024-02-23 ENCOUNTER — TELEPHONE (OUTPATIENT)
Dept: FAMILY MEDICINE CLINIC | Facility: CLINIC | Age: 24
End: 2024-02-23
Payer: MEDICAID

## 2024-02-23 NOTE — TELEPHONE ENCOUNTER
Patient has been on emgality for 3 months now. Before being on emgality, she was getting several migraines a month and missing several days of work. Once the Emgality was started, the migraines decreased to once a month, typically right before the injection was due. The patient has a better quality of life. She is missing less work and is doing very well.

## 2024-03-04 RX ORDER — OFLOXACIN 3 MG/ML
SOLUTION AURICULAR (OTIC)
Qty: 10 ML | Refills: 2 | OUTPATIENT
Start: 2024-03-04

## 2024-03-12 ENCOUNTER — OFFICE VISIT (OUTPATIENT)
Dept: GASTROENTEROLOGY | Age: 24
End: 2024-03-12
Payer: MEDICAID

## 2024-03-12 VITALS
OXYGEN SATURATION: 99 % | HEART RATE: 82 BPM | DIASTOLIC BLOOD PRESSURE: 70 MMHG | BODY MASS INDEX: 37.9 KG/M2 | WEIGHT: 222 LBS | SYSTOLIC BLOOD PRESSURE: 130 MMHG | HEIGHT: 64 IN

## 2024-03-12 DIAGNOSIS — K58.1 IRRITABLE BOWEL SYNDROME WITH CONSTIPATION: Primary | ICD-10-CM

## 2024-03-12 PROCEDURE — 99214 OFFICE O/P EST MOD 30 MIN: CPT | Performed by: NURSE PRACTITIONER

## 2024-03-12 RX ORDER — ARIPIPRAZOLE 5 MG/1
5 TABLET ORAL DAILY
COMMUNITY

## 2024-03-12 RX ORDER — EPINEPHRINE 0.3 MG/.3ML
0.3 INJECTION SUBCUTANEOUS ONCE
COMMUNITY
Start: 2023-04-18

## 2024-03-12 RX ORDER — ZOLPIDEM TARTRATE 5 MG/1
5 TABLET ORAL NIGHTLY PRN
COMMUNITY

## 2024-03-12 NOTE — PROGRESS NOTES
Subjective:     Patient ID: Autumn Burt is a 23 y.o. female  PCP: Martine Olson MD  Referring Provider: No ref. provider found    HPI  Patient presents to the office today with the following complaints: Follow-up (2 month follow up)      Patient seen in the office today for follow up on her constipation  I saw her last month and we discussed her constipation and we decided to try amitiza and increasing her water intake  Today she states the amitiza was too much for her it gave her diarrhea \"all the time\"   We discussed trying another medication and we are going to try Linzess 72 mcg and I will give her samples today  States she is increasing her water intake       Assessment:     1. Irritable bowel syndrome with constipation       Review of Systems   Constitutional:  Negative for activity change, appetite change, fatigue, fever and unexpected weight change.   HENT:  Negative for trouble swallowing.    Respiratory:  Negative for cough, choking and shortness of breath.    Cardiovascular:  Negative for chest pain.   Gastrointestinal:  Positive for constipation. Negative for abdominal distention, abdominal pain, anal bleeding, blood in stool, diarrhea, nausea, rectal pain and vomiting.   Allergic/Immunologic: Negative for food allergies.   All other systems reviewed and are negative.      Plan:   Continue miralax daily   Linzess 72 mcg samples x3 given   She will call and let me know how the linzess works for her   Orders  No orders of the defined types were placed in this encounter.    Medications  No orders of the defined types were placed in this encounter.        Patient History:     Past Medical History:   Diagnosis Date    Anxiety     Asthma     Gastritis        Past Surgical History:   Procedure Laterality Date    CHOLECYSTECTOMY  02/22/2017    UPPER GASTROINTESTINAL ENDOSCOPY  2017    [facility]- normal per pt's mom    UPPER GASTROINTESTINAL ENDOSCOPY N/A 12/13/2023    Dr Eid,

## 2024-03-13 ASSESSMENT — ENCOUNTER SYMPTOMS
RECTAL PAIN: 0
VOMITING: 0
CONSTIPATION: 1
ANAL BLEEDING: 0
NAUSEA: 0
ABDOMINAL PAIN: 0
DIARRHEA: 0
TROUBLE SWALLOWING: 0
CHOKING: 0
ABDOMINAL DISTENTION: 0
COUGH: 0
BLOOD IN STOOL: 0
SHORTNESS OF BREATH: 0

## 2024-03-22 ENCOUNTER — OFFICE VISIT (OUTPATIENT)
Dept: PRIMARY CARE CLINIC | Age: 24
End: 2024-03-22
Payer: MEDICAID

## 2024-03-22 VITALS
OXYGEN SATURATION: 98 % | BODY MASS INDEX: 38.45 KG/M2 | HEART RATE: 86 BPM | WEIGHT: 224 LBS | TEMPERATURE: 98.5 F | RESPIRATION RATE: 16 BRPM | SYSTOLIC BLOOD PRESSURE: 110 MMHG | DIASTOLIC BLOOD PRESSURE: 68 MMHG

## 2024-03-22 DIAGNOSIS — J02.0 STREP THROAT: Primary | ICD-10-CM

## 2024-03-22 DIAGNOSIS — J02.9 SORE THROAT: ICD-10-CM

## 2024-03-22 LAB — S PYO AG THROAT QL: POSITIVE

## 2024-03-22 PROCEDURE — 99213 OFFICE O/P EST LOW 20 MIN: CPT

## 2024-03-22 PROCEDURE — 87880 STREP A ASSAY W/OPTIC: CPT

## 2024-03-22 RX ORDER — CEFDINIR 300 MG/1
300 CAPSULE ORAL 2 TIMES DAILY
Qty: 20 CAPSULE | Refills: 0 | Status: SHIPPED | OUTPATIENT
Start: 2024-03-22 | End: 2024-04-01

## 2024-03-22 ASSESSMENT — ENCOUNTER SYMPTOMS
SINUS PAIN: 0
VOMITING: 0
SORE THROAT: 1
SHORTNESS OF BREATH: 0
COUGH: 0
SINUS PRESSURE: 0
RHINORRHEA: 0
NAUSEA: 0

## 2024-03-22 NOTE — PROGRESS NOTES
[facility]  [facility] [address]  [address]  [place] [address]  Dept: [phone]  Dept Fax: [phone]  Loc: [phone]    Autumn Burt is a 23 y.o. female who presents today for her medical conditions/complaints as noted below.  Autumn Burt is complaining of Otalgia and Pharyngitis        HPI:   Otalgia   There is pain in both ears. This is a new problem. The current episode started in the past 7 days. The problem occurs every few minutes. The problem has been waxing and waning. There has been no fever. The pain is mild. Associated symptoms include a sore throat. Pertinent negatives include no coughing, diarrhea, headaches, rhinorrhea or vomiting. Her past medical history is significant for a chronic ear infection and a tympanostomy tube.   Pharyngitis  This is a new problem. The current episode started in the past 7 days. The problem occurs intermittently. The problem has been waxing and waning. Associated symptoms include a sore throat. Pertinent negatives include no congestion, coughing, fever, headaches, nausea or vomiting. The symptoms are aggravated by drinking, eating and swallowing. She has tried rest for the symptoms.       Past Medical History:   Diagnosis Date    Anxiety     Asthma     Gastritis        Past Surgical History:   Procedure Laterality Date    CHOLECYSTECTOMY  02/22/2017    UPPER GASTROINTESTINAL ENDOSCOPY  2017    [facility]- normal per pt's mom    UPPER GASTROINTESTINAL ENDOSCOPY N/A 12/13/2023    Dr Eid, (-) Sprue, no lesions to explains symptoms,       Family History   Problem Relation Age of Onset    Rheum Arthritis Mother     Depression Mother     Colon Cancer Maternal Grandmother 69    Dementia Maternal Grandmother     Heart Disease Maternal Grandmother     Dementia Maternal Grandfather     Heart Disease Maternal Grandfather     Cancer Maternal Grandfather     Diabetes Paternal Grandmother     Colon

## 2024-04-02 ENCOUNTER — OFFICE VISIT (OUTPATIENT)
Dept: PRIMARY CARE CLINIC | Age: 24
End: 2024-04-02
Payer: MEDICAID

## 2024-04-02 VITALS
WEIGHT: 222 LBS | TEMPERATURE: 97.9 F | SYSTOLIC BLOOD PRESSURE: 120 MMHG | BODY MASS INDEX: 38.11 KG/M2 | DIASTOLIC BLOOD PRESSURE: 62 MMHG | HEART RATE: 103 BPM | OXYGEN SATURATION: 98 % | RESPIRATION RATE: 16 BRPM

## 2024-04-02 DIAGNOSIS — J06.9 VIRAL URI: Primary | ICD-10-CM

## 2024-04-02 DIAGNOSIS — J02.9 SORE THROAT: ICD-10-CM

## 2024-04-02 LAB — S PYO AG THROAT QL: NORMAL

## 2024-04-02 PROCEDURE — 87880 STREP A ASSAY W/OPTIC: CPT | Performed by: NURSE PRACTITIONER

## 2024-04-02 PROCEDURE — 99213 OFFICE O/P EST LOW 20 MIN: CPT | Performed by: NURSE PRACTITIONER

## 2024-04-02 ASSESSMENT — ENCOUNTER SYMPTOMS
COUGH: 1
EYE ITCHING: 0
COLOR CHANGE: 0
EYE DISCHARGE: 0
RHINORRHEA: 0
SINUS PRESSURE: 0
DIARRHEA: 0
SHORTNESS OF BREATH: 0
NAUSEA: 0
VOMITING: 0
SORE THROAT: 1
ABDOMINAL PAIN: 0
BLOOD IN STOOL: 0
CONSTIPATION: 0

## 2024-04-02 NOTE — PROGRESS NOTES
Pneumococcal 0-64 years Vaccine  Aged Out    Chlamydia/GC screen  Discontinued       Subjective:   Review of Systems   Constitutional:  Negative for activity change, appetite change, chills, fatigue and fever (unmeasured).   HENT:  Positive for congestion, ear pain and sore throat. Negative for rhinorrhea and sinus pressure.    Eyes:  Negative for discharge and itching.   Respiratory:  Positive for cough and wheezing. Negative for shortness of breath.    Cardiovascular:  Negative for chest pain.   Gastrointestinal:  Negative for abdominal pain, blood in stool, constipation, diarrhea, nausea and vomiting.   Musculoskeletal:  Negative for myalgias.   Skin:  Negative for color change and rash.   Neurological:  Negative for dizziness and headaches.   All other systems reviewed and are negative.      Objective    Physical Exam  Vitals and nursing note reviewed.   Constitutional:       General: She is not in acute distress.     Appearance: Normal appearance.   HENT:      Head: Normocephalic and atraumatic.      Right Ear: Ear canal normal. A middle ear effusion is present. A PE tube is present.      Left Ear: Ear canal normal. A middle ear effusion is present. A PE tube is present.      Mouth/Throat:      Mouth: Mucous membranes are moist.      Pharynx: No posterior oropharyngeal erythema.      Comments: Clear post nasal drip noted    Eyes:      Extraocular Movements: Extraocular movements intact.      Pupils: Pupils are equal, round, and reactive to light.   Cardiovascular:      Rate and Rhythm: Normal rate and regular rhythm.      Pulses: Normal pulses.      Heart sounds: Normal heart sounds. No murmur heard.  Pulmonary:      Effort: Pulmonary effort is normal. No respiratory distress.      Breath sounds: Normal breath sounds. No wheezing.   Skin:     General: Skin is warm.      Capillary Refill: Capillary refill takes less than 2 seconds.      Coloration: Skin is not pale.      Findings: No rash.   Neurological:

## 2024-04-02 NOTE — PATIENT INSTRUCTIONS
Recommended supportive care:  - Increase fluid intake  - Encouraged adequate rest  - Recommended OTC claritin or zyrtec and flonase  - Continue prescribed Singulair and inhalers for asthma  - Take OTC motrin/tylenol for fevers/body aches unless contraindicated  - Stay home until at least 24 hours fever free without medications.   - The patient is to follow up with PCP or return to clinic if symptoms worsen/fail to improve.

## 2024-04-12 DIAGNOSIS — J45.41 MODERATE PERSISTENT ASTHMA WITH ACUTE EXACERBATION: ICD-10-CM

## 2024-04-12 DIAGNOSIS — H66.90 ACUTE OTITIS MEDIA, UNSPECIFIED OTITIS MEDIA TYPE: Primary | ICD-10-CM

## 2024-04-12 DIAGNOSIS — R11.2 NAUSEA AND VOMITING, UNSPECIFIED VOMITING TYPE: ICD-10-CM

## 2024-04-12 RX ORDER — ONDANSETRON 4 MG/1
4 TABLET, FILM COATED ORAL EVERY 8 HOURS PRN
Qty: 9 TABLET | Refills: 1 | Status: SHIPPED | OUTPATIENT
Start: 2024-04-12

## 2024-04-12 RX ORDER — CLINDAMYCIN HYDROCHLORIDE 300 MG/1
300 CAPSULE ORAL 2 TIMES DAILY
Qty: 20 CAPSULE | Refills: 0 | Status: SHIPPED | OUTPATIENT
Start: 2024-04-12

## 2024-04-12 RX ORDER — METHYLPREDNISOLONE 4 MG/1
TABLET ORAL
Qty: 1 EACH | Refills: 0 | Status: SHIPPED | OUTPATIENT
Start: 2024-04-12

## 2024-04-12 RX ORDER — PROMETHAZINE HYDROCHLORIDE 25 MG/1
25 TABLET ORAL EVERY 6 HOURS PRN
Qty: 10 TABLET | Refills: 2 | Status: SHIPPED | OUTPATIENT
Start: 2024-04-12

## 2024-04-30 RX ORDER — OFLOXACIN 3 MG/ML
5 SOLUTION AURICULAR (OTIC) DAILY
Qty: 10 ML | Refills: 2 | Status: SHIPPED | OUTPATIENT
Start: 2024-04-30

## 2024-05-02 ENCOUNTER — HOSPITAL ENCOUNTER (EMERGENCY)
Facility: HOSPITAL | Age: 24
Discharge: HOME | End: 2024-05-03
Attending: FAMILY MEDICINE
Payer: COMMERCIAL

## 2024-05-02 ENCOUNTER — APPOINTMENT (OUTPATIENT)
Dept: CT IMAGING | Facility: HOSPITAL | Age: 24
End: 2024-05-02
Payer: COMMERCIAL

## 2024-05-02 ENCOUNTER — APPOINTMENT (OUTPATIENT)
Dept: GENERAL RADIOLOGY | Facility: HOSPITAL | Age: 24
End: 2024-05-02
Payer: COMMERCIAL

## 2024-05-02 DIAGNOSIS — R55 SYNCOPE AND COLLAPSE: Primary | ICD-10-CM

## 2024-05-02 LAB
ALBUMIN SERPL-MCNC: 4.1 G/DL (ref 3.5–5.2)
ALBUMIN/GLOB SERPL: 1.5 G/DL
ALP SERPL-CCNC: 84 U/L (ref 39–117)
ALT SERPL W P-5'-P-CCNC: 19 U/L (ref 1–33)
ANION GAP SERPL CALCULATED.3IONS-SCNC: 8 MMOL/L (ref 5–15)
AST SERPL-CCNC: 14 U/L (ref 1–32)
B-HCG UR QL: NEGATIVE
BASOPHILS # BLD AUTO: 0.08 10*3/MM3 (ref 0–0.2)
BASOPHILS NFR BLD AUTO: 0.7 % (ref 0–1.5)
BILIRUB SERPL-MCNC: 0.2 MG/DL (ref 0–1.2)
BUN SERPL-MCNC: 8 MG/DL (ref 6–20)
BUN/CREAT SERPL: 12.1 (ref 7–25)
CALCIUM SPEC-SCNC: 9.5 MG/DL (ref 8.6–10.5)
CHLORIDE SERPL-SCNC: 107 MMOL/L (ref 98–107)
CO2 SERPL-SCNC: 28 MMOL/L (ref 22–29)
CREAT SERPL-MCNC: 0.66 MG/DL (ref 0.57–1)
D DIMER PPP FEU-MCNC: 0.54 MCGFEU/ML (ref 0–0.5)
DEPRECATED RDW RBC AUTO: 42 FL (ref 37–54)
EGFRCR SERPLBLD CKD-EPI 2021: 125 ML/MIN/1.73
EOSINOPHIL # BLD AUTO: 0.4 10*3/MM3 (ref 0–0.4)
EOSINOPHIL NFR BLD AUTO: 3.3 % (ref 0.3–6.2)
ERYTHROCYTE [DISTWIDTH] IN BLOOD BY AUTOMATED COUNT: 12.7 % (ref 12.3–15.4)
GLOBULIN UR ELPH-MCNC: 2.8 GM/DL
GLUCOSE SERPL-MCNC: 98 MG/DL (ref 65–99)
HCT VFR BLD AUTO: 40.7 % (ref 34–46.6)
HGB BLD-MCNC: 13.8 G/DL (ref 12–15.9)
IMM GRANULOCYTES # BLD AUTO: 0.05 10*3/MM3 (ref 0–0.05)
IMM GRANULOCYTES NFR BLD AUTO: 0.4 % (ref 0–0.5)
LIPASE SERPL-CCNC: 33 U/L (ref 13–60)
LYMPHOCYTES # BLD AUTO: 2.17 10*3/MM3 (ref 0.7–3.1)
LYMPHOCYTES NFR BLD AUTO: 18.2 % (ref 19.6–45.3)
MCH RBC QN AUTO: 30.7 PG (ref 26.6–33)
MCHC RBC AUTO-ENTMCNC: 33.9 G/DL (ref 31.5–35.7)
MCV RBC AUTO: 90.4 FL (ref 79–97)
MONOCYTES # BLD AUTO: 0.78 10*3/MM3 (ref 0.1–0.9)
MONOCYTES NFR BLD AUTO: 6.5 % (ref 5–12)
NEUTROPHILS NFR BLD AUTO: 70.9 % (ref 42.7–76)
NEUTROPHILS NFR BLD AUTO: 8.47 10*3/MM3 (ref 1.7–7)
NRBC BLD AUTO-RTO: 0 /100 WBC (ref 0–0.2)
PLATELET # BLD AUTO: 307 10*3/MM3 (ref 140–450)
PMV BLD AUTO: 8.8 FL (ref 6–12)
POTASSIUM SERPL-SCNC: 3.5 MMOL/L (ref 3.5–5.2)
PROT SERPL-MCNC: 6.9 G/DL (ref 6–8.5)
RBC # BLD AUTO: 4.5 10*6/MM3 (ref 3.77–5.28)
SODIUM SERPL-SCNC: 143 MMOL/L (ref 136–145)
TROPONIN T SERPL HS-MCNC: <6 NG/L
WBC NRBC COR # BLD AUTO: 11.95 10*3/MM3 (ref 3.4–10.8)

## 2024-05-02 PROCEDURE — 25510000001 IOPAMIDOL PER 1 ML: Performed by: EMERGENCY MEDICINE

## 2024-05-02 PROCEDURE — 85379 FIBRIN DEGRADATION QUANT: CPT | Performed by: EMERGENCY MEDICINE

## 2024-05-02 PROCEDURE — 84484 ASSAY OF TROPONIN QUANT: CPT | Performed by: EMERGENCY MEDICINE

## 2024-05-02 PROCEDURE — 93005 ELECTROCARDIOGRAM TRACING: CPT | Performed by: EMERGENCY MEDICINE

## 2024-05-02 PROCEDURE — 81025 URINE PREGNANCY TEST: CPT | Performed by: EMERGENCY MEDICINE

## 2024-05-02 PROCEDURE — 80053 COMPREHEN METABOLIC PANEL: CPT | Performed by: EMERGENCY MEDICINE

## 2024-05-02 PROCEDURE — 71275 CT ANGIOGRAPHY CHEST: CPT

## 2024-05-02 PROCEDURE — 83690 ASSAY OF LIPASE: CPT | Performed by: EMERGENCY MEDICINE

## 2024-05-02 PROCEDURE — 99285 EMERGENCY DEPT VISIT HI MDM: CPT

## 2024-05-02 PROCEDURE — 71045 X-RAY EXAM CHEST 1 VIEW: CPT

## 2024-05-02 PROCEDURE — 85025 COMPLETE CBC W/AUTO DIFF WBC: CPT | Performed by: EMERGENCY MEDICINE

## 2024-05-02 RX ADMIN — IOPAMIDOL 74 ML: 755 INJECTION, SOLUTION INTRAVENOUS at 22:36

## 2024-05-03 ENCOUNTER — OFFICE VISIT (OUTPATIENT)
Dept: FAMILY MEDICINE CLINIC | Facility: CLINIC | Age: 24
End: 2024-05-03
Payer: COMMERCIAL

## 2024-05-03 VITALS
TEMPERATURE: 98.4 F | HEART RATE: 89 BPM | DIASTOLIC BLOOD PRESSURE: 85 MMHG | OXYGEN SATURATION: 99 % | BODY MASS INDEX: 37.9 KG/M2 | RESPIRATION RATE: 18 BRPM | HEIGHT: 64 IN | WEIGHT: 222 LBS | SYSTOLIC BLOOD PRESSURE: 126 MMHG

## 2024-05-03 VITALS
SYSTOLIC BLOOD PRESSURE: 121 MMHG | WEIGHT: 219 LBS | DIASTOLIC BLOOD PRESSURE: 86 MMHG | HEIGHT: 64 IN | OXYGEN SATURATION: 98 % | HEART RATE: 74 BPM | BODY MASS INDEX: 37.39 KG/M2

## 2024-05-03 DIAGNOSIS — R55 SYNCOPE AND COLLAPSE: ICD-10-CM

## 2024-05-03 DIAGNOSIS — R40.20 LOSS OF CONSCIOUSNESS: ICD-10-CM

## 2024-05-03 DIAGNOSIS — H91.92 HEARING LOSS OF LEFT EAR, UNSPECIFIED HEARING LOSS TYPE: ICD-10-CM

## 2024-05-03 DIAGNOSIS — Z86.69 HISTORY OF CHRONIC EAR INFECTION: Primary | ICD-10-CM

## 2024-05-03 LAB
QT INTERVAL: 356 MS
QTC INTERVAL: 395 MS

## 2024-05-03 PROCEDURE — 99214 OFFICE O/P EST MOD 30 MIN: CPT | Performed by: NURSE PRACTITIONER

## 2024-05-03 RX ORDER — ACETAMINOPHEN 500 MG
1000 TABLET ORAL ONCE
Status: COMPLETED | OUTPATIENT
Start: 2024-05-03 | End: 2024-05-03

## 2024-05-03 RX ORDER — NEOMYCIN SULFATE, POLYMYXIN B SULFATE, HYDROCORTISONE 3.5; 10000; 1 MG/ML; [USP'U]/ML; MG/ML
4 SOLUTION/ DROPS AURICULAR (OTIC) 4 TIMES DAILY
Qty: 10 ML | Refills: 1 | Status: SHIPPED | OUTPATIENT
Start: 2024-05-03

## 2024-05-03 RX ADMIN — ACETAMINOPHEN 1000 MG: 500 TABLET, FILM COATED ORAL at 00:10

## 2024-05-03 NOTE — PROGRESS NOTES
"Chief Complaint  Loss of Consciousness, ER Follow Up, changes in vision, and Headache    Subjective    History of Present Illness      Patient presents to [facility] for   History of Present Illness  Pt is here today for ER f/u for syncope and dizziness. She has been seen by [facility] and [facility] without findings. CT and EKG have been normal.   She reports 3 episodes of vomiting that coincide with symptoms.     She is currently taking Cefdinir 500mg for ear infection and Strep as diagnosed by [facility]        Review of Systems    I have reviewed and agree with the HPI information as above.  Yvette Salazar, APRN     Objective   Vital Signs:   /86   Pulse 74   Ht 162.6 cm (64\")   Wt 99.3 kg (219 lb)   SpO2 98%   BMI 37.59 kg/m²            Physical Exam  Vitals and nursing note reviewed.   Constitutional:       Appearance: Normal appearance. She is well-developed. She is obese.   HENT:      Head: Normocephalic and atraumatic.      Right Ear: Tympanic membrane, ear canal and external ear normal. A PE tube is present.      Left Ear: Tympanic membrane, ear canal and external ear normal. Decreased hearing noted. Drainage present. There is mastoid tenderness. Tympanic membrane is bulging.      Nose: Nose normal. No septal deviation, nasal tenderness or congestion.      Mouth/Throat:      Lips: Pink. No lesions.      Mouth: Mucous membranes are moist. No oral lesions.      Dentition: Normal dentition.      Pharynx: Oropharynx is clear. No pharyngeal swelling, oropharyngeal exudate or posterior oropharyngeal erythema.   Eyes:      General: Lids are normal. Vision grossly intact. No scleral icterus.        Right eye: No discharge.         Left eye: No discharge.      Extraocular Movements: Extraocular movements intact.      Conjunctiva/sclera: Conjunctivae normal.      Right eye: Right conjunctiva is not injected.      Left eye: Left conjunctiva is not " injected.      Pupils: Pupils are equal, round, and reactive to light.   Neck:      Thyroid: No thyroid mass.      Trachea: Trachea normal.   Cardiovascular:      Rate and Rhythm: Normal rate and regular rhythm.      Heart sounds: Normal heart sounds. No murmur heard.     No gallop.   Pulmonary:      Effort: Pulmonary effort is normal.      Breath sounds: Normal breath sounds and air entry. No wheezing, rhonchi or rales.   Musculoskeletal:         General: No tenderness or deformity. Normal range of motion.      Cervical back: Full passive range of motion without pain, normal range of motion and neck supple.      Thoracic back: Normal.      Right lower leg: No edema.      Left lower leg: No edema.   Skin:     General: Skin is warm and dry.      Coloration: Skin is not jaundiced.      Findings: No rash.   Neurological:      Mental Status: She is alert and oriented to person, place, and time.      Sensory: Sensation is intact.      Motor: Motor function is intact.      Coordination: Coordination is intact.      Gait: Gait is intact.      Deep Tendon Reflexes: Reflexes are normal and symmetric.   Psychiatric:         Mood and Affect: Mood and affect normal.         Behavior: Behavior normal.         Judgment: Judgment normal.               Result Review  Data Reviewed:   The following data was reviewed by: KRISTEN Pickett on 05/03/2024:  Common labs          6/22/2023    16:00 10/31/2023    15:26 5/2/2024    21:27   Common Labs   Glucose 99  92  98    BUN 15  9  8    Creatinine 0.82  0.77  0.66    Sodium 140  138  143    Potassium 3.9  3.9  3.5    Chloride 104  104  107    Calcium 9.7  9.9  9.5    Albumin 4.7  4.3  4.1    Total Bilirubin 0.4  0.6  0.2    Alkaline Phosphatase 89  81  84    AST (SGOT) 27  30  14    ALT (SGPT) 27  28  19    WBC 8.10  5.80  11.95    Hemoglobin 14.2  13.7  13.8    Hematocrit 42.2  39.9  40.7    Platelets 312  298  307    Total Cholesterol 251      Triglycerides 77      HDL  Cholesterol 70      LDL Cholesterol  168      Uric Acid 4.1        MRI Brain With & Without Contrast (05/03/2024 11:27)  CT Angiogram Chest (05/02/2024 22:35)  XR Chest 1 View (05/02/2024 21:24)   ED with [place] Villeda MD (05/02/2024)          Assessment and Plan      Diagnoses and all orders for this visit:    1. History of chronic ear infection (Primary)  -     Ambulatory Referral to ENT (Otolaryngology)  -     neomycin-polymyxin-hydrocortisone (CORTISPORIN) 1 % solution otic solution; Administer 4 drops into the left ear 4 (Four) Times a Day.  Dispense: 10 mL; Refill: 1    2. Hearing loss of left ear, unspecified hearing loss type  -     Ambulatory Referral to ENT (Otolaryngology)    3. Syncope and collapse  -     MRI Brain With & Without Contrast; Future  -     EEG; Future    4. Loss of consciousness  -     MRI Brain With & Without Contrast; Future  -     EEG; Future      Patient has been sick several days with a sore throat, headache, nausea, vomiting, ear pain, syncope with collapse, and loss of consciousness.  She has been to [facility] emergency room and [facility] emergency room as of last night.  CT of the head and labs were completed at [facility] and were unremarkable.  I have requested records for these.  She did positive for strep at [facility].  They diagnosed her with strep and ear infection.  They started her on Biaxin for her throat and ear.  She did not improve with this treatment and continued to pass out.  She complains of brain fog and headache.  She states that she just does not feel right.  She is in a big fog after these happen.  Mom is worried about seizures.  Patient states that her left ear has complete hearing loss.  She does have a history of tube placement.  The left ear currently does have yellow drainage.  Plan  1.  Will refer to a local ENT, she has seen one in [place] in the past  2.  Will continue the Biaxin that she is on and add a Cortisporin eardrop  3.  Will get  an MRI of the brain with and without contrast  4.  EEG to rule out seizure      Follow Up   Return if symptoms worsen or fail to improve.  Patient was given instructions and counseling regarding her condition or for health maintenance advice. Please see specific information pulled into the AVS if appropriate.

## 2024-05-03 NOTE — ED PROVIDER NOTES
"EMERGENCY DEPARTMENT ATTENDING NOTE    Patient Name: Lucrecia Lloyd    Chief Complaint   Patient presents with    Syncope       PATIENT PRESENTATION:  Lucrecia Lloyd is a 23 y.o. female with PMH significant for asthma, migraines, left-sided tympanostomy tube who presents to the ED with syncope x 2 episodes 1 just prior to arrival at home when she was sitting on her bed and fell backwards and 1 on Monday.  She was evaluated at [place] with a CT head labs and EKG and was told she had a ear infection and discharged on cefdinir.  Patient reports blurry [place] vision 1 episode of emesis and passing out on Monday when she was getting ready for work leaning against the counter and mom caught her so she does not have any fall or trauma.  Today she was sitting in her bed this evening had the same vision symptoms vomited twice and then passed out.  Reports tingling in her right arm on Monday and subjective tingling periorally today after she woke up.  Today's was unwitnessed.  Does have intermittent central stabbing chest pain that last for about 5 minutes throughout the entire day today and is occurring hourly.  Denies any lower extremity pain or swelling, recent travel, history of surgeries, history of cancer, hemoptysis.  Does have grandmother and grandfather who had lower extremity DVTs.  Patient without any tonic-clonic activity, loss of bowel or bladder function, or tongue biting.  No personal or family history of seizures      PHYSICAL EXAM:   VS: /99   Pulse 89   Temp 97.9 °F (36.6 °C)   Resp 18   Ht 162.6 cm (64\")   Wt 101 kg (222 lb)   LMP 04/01/2024 (Approximate)   SpO2 100%   BMI 38.11 kg/m²   GENERAL: well-nourished, well-developed, awake, alert, no acute distress, nontoxic appearing, comfortable  EYES: PERRL, sclerae anicteric, extraocular movements grossly intact, symmetric lids  EARS, NOSE, MOUTH, THROAT: atraumatic external nose and ears, moist mucous membranes, left demand ostomy tube in " "place with drainage of clear fluid  NECK: symmetric, trachea midline  RESPIRATORY: unlabored respiratory effort, clear to auscultation bilaterally, good air movement  CARDIOVASCULAR: no murmurs, peripheral pulses 2+ and equal in all extremities  GI: soft, nontender, nondistended  MUSCULOSKELETAL/EXTREMITIES: extremities without obvious deformity  SKIN: warm and dry with no obvious rashes  NEUROLOGIC: moving all 4 extremities symmetrically, CN II-XII intact, normal gait, normal motor and sensory exam, normal cerebellar exam  PSYCHIATRIC: alert, pleasant and cooperative. Appropriate mood and affect.      MEDICAL DECISION MAKING:    Lucrecia Lloyd is a 23 y.o. female who presented to the ED with syncope x 2 episodes, no falls or trauma    Procedures    Differential Diagnosis Considered: Arrhythmia, pulmonary embolism, complex migraine, vasovagal syncope, hyperventilation    Labs Ordered:  Labs Reviewed   D-DIMER, QUANTITATIVE - Abnormal; Notable for the following components:       Result Value    D-Dimer, Quantitative 0.54 (*)     All other components within normal limits    Narrative:     According to the assay 's published package insert, a normal (<0.50 MCGFEU/mL) D-dimer result in conjunction with a non-high clinical probability assessment, excludes deep vein thrombosis (DVT) and pulmonary embolism (PE) with high sensitivity.    D-dimer values increase with age and this can make VTE exclusion of an older population difficult. To address this, the American College of [employer], based on best available evidence and recent guidelines, recommends that clinicians use age-adjusted D-dimer thresholds in patients greater than 50 years of age with: a) a low probability of PE who do not meet all Pulmonary Embolism Rule Out Criteria, or b) in those with intermediate probability of PE.   The formula for an age-adjusted D-dimer cut-off is \"age/100\".  For example, a 60 year old patient would have an age-adjusted " cut-off of 0.60 MCGFEU/mL and an 80 year old 0.80 MCGFEU/mL.   CBC WITH AUTO DIFFERENTIAL - Abnormal; Notable for the following components:    WBC 11.95 (*)     Lymphocyte % 18.2 (*)     Neutrophils, Absolute 8.47 (*)     All other components within normal limits   LIPASE - Normal   SINGLE HS TROPONIN T - Normal    Narrative:     High Sensitive Troponin T Reference Range:  <14.0 ng/L- Negative Female for AMI  <22.0 ng/L- Negative Male for AMI  >=14 - Abnormal Female indicating possible myocardial injury.  >=22 - Abnormal Male indicating possible myocardial injury.   Clinicians would have to utilize clinical acumen, EKG, Troponin, and serial changes to determine if it is an Acute Myocardial Infarction or myocardial injury due to an underlying chronic condition.        PREGNANCY, URINE - Normal   COMPREHENSIVE METABOLIC PANEL    Narrative:     GFR Normal >60  Chronic Kidney Disease <60  Kidney Failure <15     CBC AND DIFFERENTIAL    Narrative:     The following orders were created for panel order CBC & Differential.  Procedure                               Abnormality         Status                     ---------                               -----------         ------                     CBC Auto Differential[[id number]]        Abnormal            Final result                 Please view results for these tests on the individual orders.        Imaging Ordered:   XR Chest 1 View   Final Result       No acute cardiopulmonary abnormality.               This report was signed and finalized on 5/2/2024 9:47 PM by Rubén Hawk.          CT Angiogram Chest    (Results Pending)       Internal chart review:   Past Medical History:   Diagnosis Date    Abdominal pain     r/t gallbladder    Acid reflux     Asthma     Migraines     Nausea     Sinusitis        Past Surgical History:   Procedure Laterality Date    CHOLECYSTECTOMY      CHOLECYSTECTOMY WITH INTRAOPERATIVE CHOLANGIOGRAM N/A 01/20/2017    Procedure: CHOLECYSTECTOMY,  LAPAROSCOPIC;  Surgeon: Lo De La Torre MD;  Location: [facility] OR;  Service:     TOOTH EXTRACTION      all top teeth removed    TYMPANOSTOMY TUBE PLACEMENT         Allergies   Allergen Reactions    Hydrocodone-Acetaminophen Itching    Amoxicillin Rash    Azithromycin Rash    Cefuroxime Rash     Rash    Hydrocodone Itching and Rash       No current facility-administered medications for this encounter.    Current Outpatient Medications:     albuterol (PROVENTIL) (2.5 MG/3ML) 0.083% nebulizer solution, Take 2.5 mg by nebulization Every 4 (Four) Hours As Needed for Wheezing., Disp: 100 mL, Rfl: 12    albuterol sulfate  (90 Base) MCG/ACT inhaler, Inhale 2 puffs As Needed for Wheezing., Disp: 18 g, Rfl: 3    Atogepant (Qulipta) 60 MG tablet, Take 1 tablet by mouth Daily., Disp: 30 tablet, Rfl: 1    clindamycin (Cleocin) 300 MG capsule, Take 1 capsule by mouth 2 (Two) Times a Day., Disp: 20 capsule, Rfl: 0    doxycycline (VIBRAMYCIN) 100 MG capsule, Take 1 capsule by mouth 2 (Two) Times a Day., Disp: 20 capsule, Rfl: 0    EPINEPHrine (EPIPEN) 0.3 MG/0.3ML solution auto-injector injection, , Disp: , Rfl:     Fluticasone-Salmeterol (ADVAIR/WIXELA) 250-50 MCG/ACT DISKUS, Inhale 1 puff 2 (Two) Times a Day., Disp: 60 each, Rfl: 2    galcanezumab-gnlm (Emgality) 120 MG/ML auto-injector pen, Inject 1 mL under the skin into the appropriate area as directed Every 30 (Thirty) Days., Disp: 1.12 mL, Rfl: 5    lamoTRIgine (LaMICtal) 100 MG tablet, , Disp: , Rfl:     methylPREDNISolone (MEDROL) 4 MG dose pack, Take as directed on package instructions., Disp: 1 each, Rfl: 0    montelukast (SINGULAIR) 10 MG tablet, Take 1 tablet by mouth Daily., Disp: 90 tablet, Rfl: 1    ofloxacin (FLOXIN) 0.3 % otic solution, Administer 5 drops into both ears Daily., Disp: 10 mL, Rfl: 2    ondansetron (ZOFRAN) 4 MG tablet, Take 1 tablet by mouth Every 8 (Eight) Hours As Needed for Nausea or Vomiting., Disp: 9 tablet, Rfl: 1    pantoprazole  (PROTONIX) 40 MG EC tablet, Take 1 tablet by mouth., Disp: , Rfl:     promethazine (PHENERGAN) 25 MG tablet, Take 1 tablet by mouth Every 6 (Six) Hours As Needed for Nausea or Vomiting., Disp: 10 tablet, Rfl: 2    promethazine-dextromethorphan (PROMETHAZINE-DM) 6.25-15 MG/5ML syrup, Take 5 mL by mouth 4 (Four) Times a Day As Needed for Cough., Disp: 118 mL, Rfl: 0    Sertraline HCl 200 MG capsule, Take 200 mg by mouth Daily., Disp: , Rfl:     ubrogepant (Ubrelvy) 100 MG tablet, Take 1 tablet with onset of headache , may repeat in 2 hours. Max of 200mg in a 24hr period, Disp: 15 tablet, Rfl: 1    vitamin D3 (Vitamin D) 125 MCG (5000 UT) capsule capsule, Take 1 capsule by mouth Daily., Disp: 30 capsule, Rfl: 0    External documents reviewed: Unable to view [place] records from Monday with normal CT head per patient and mother    My EKG interpretation: EKG normal sinus rhythm, rate of 74, normal intervals no signs of ischemia or arrhythmia.    My lab interpretation: Lab work unremarkable besides mild leukocytosis    My imaging interpretation: CT PE without signs of clot or other acute finding    Discussed with: Patient and mother regarding results, normal neurologic exam, no signs of pulmonary embolism, negative pregnancy test.  EKG without signs of arrhythmia.  History consistent with vasovagal syncope after vomiting and could be related to her migraines.  History not consistent with seizures.    ED Course and Re-evaluation: Patient remained asymptomatic with normal neurologic exam and gait.  Will follow-up with PCM regarding these syncopal episodes and gave return precautions.  Discussed avoiding risky activities where passing out could result in self-harm due to environment.    ED Critical Care time:     ED Diagnosis:  (R55) Syncope and collapse     Disposition: to home  Follow up plan: PCP follow up within 2 days, return to ED immediately if symptoms worsen        Signed:  [place] Villeda MD  Emergency Medicine  Physician    Please note that portions of this note were completed with a voice recognition program.      [place] Villeda MD  05/03/24 0017

## 2024-05-11 DIAGNOSIS — G43.509 PERSISTENT MIGRAINE AURA WITHOUT CEREBRAL INFARCTION AND WITHOUT STATUS MIGRAINOSUS, NOT INTRACTABLE: ICD-10-CM

## 2024-05-13 ENCOUNTER — TELEPHONE (OUTPATIENT)
Dept: NEUROLOGY | Facility: HOSPITAL | Age: 24
End: 2024-05-13
Payer: COMMERCIAL

## 2024-05-13 NOTE — TELEPHONE ENCOUNTER
Rx Refill Note  Requested Prescriptions     Pending Prescriptions Disp Refills    ubrogepant (Ubrelvy) 100 MG tablet [Pharmacy Med Name: UBRELVY 100 MG TABLET] 10 tablet      Sig: TAKE 1 TABLET BY MOUTH AT ONSET OF HEADACHE; MAY REPEAT 1 TABLET IN 2 HOURS IF NEEDED. MAX OF 2 TABLETS IN A 24 HOUR PERIOD      Last office visit with prescribing clinician: 5/3/2024   Last telemedicine visit with prescribing clinician: Visit date not found   Next office visit with prescribing clinician: Visit date not found                         Would you like a call back once the refill request has been completed: [] Yes [] No    If the office needs to give you a call back, can they leave a voicemail: [] Yes [] No    Nicolle Shepard MA  05/13/24, 08:42 CDT

## 2024-05-14 ENCOUNTER — TELEPHONE (OUTPATIENT)
Dept: FAMILY MEDICINE CLINIC | Facility: CLINIC | Age: 24
End: 2024-05-14
Payer: COMMERCIAL

## 2024-05-14 ENCOUNTER — HOSPITAL ENCOUNTER (OUTPATIENT)
Dept: NEUROLOGY | Facility: HOSPITAL | Age: 24
Discharge: HOME | End: 2024-05-14
Admitting: NURSE PRACTITIONER
Payer: COMMERCIAL

## 2024-05-14 DIAGNOSIS — R56.9 SEIZURE-LIKE ACTIVITY: Primary | ICD-10-CM

## 2024-05-14 DIAGNOSIS — R55 SYNCOPE AND COLLAPSE: ICD-10-CM

## 2024-05-14 DIAGNOSIS — R40.20 LOSS OF CONSCIOUSNESS: ICD-10-CM

## 2024-05-14 PROCEDURE — 95816 EEG AWAKE AND DROWSY: CPT | Performed by: PSYCHIATRY & NEUROLOGY

## 2024-05-14 PROCEDURE — 95816 EEG AWAKE AND DROWSY: CPT

## 2024-05-14 NOTE — TELEPHONE ENCOUNTER
RUTHANN for patient to call regarding MRI. I have gotten her an appt for 05/16/2024 @ 2:30 at the [facility] hospital. I have told them she will probably need a wheel chair. No eating or drinking 4 hours prior to MRI. Take list of medications. Wear comfortable clothes. Nothing with metal, like zippers. No piercings.

## 2024-05-15 ENCOUNTER — TELEPHONE (OUTPATIENT)
Dept: FAMILY MEDICINE CLINIC | Facility: CLINIC | Age: 24
End: 2024-05-15
Payer: COMMERCIAL

## 2024-05-15 NOTE — TELEPHONE ENCOUNTER
Sent pt Mediast[employer] message relaying below    HUB TO RELAY  Your EEG was completely normal. Bessy said she is working on an urgent referral to Neurology for you. Their office should be in touch with you to schedule an appointment. We will be in touch with you regarding the results of your MRI. Please let me know if you have any questions.

## 2024-05-16 ENCOUNTER — HOSPITAL ENCOUNTER (OUTPATIENT)
Dept: MRI IMAGING | Facility: HOSPITAL | Age: 24
Discharge: HOME | End: 2024-05-16
Admitting: NURSE PRACTITIONER
Payer: COMMERCIAL

## 2024-05-16 ENCOUNTER — TELEPHONE (OUTPATIENT)
Dept: FAMILY MEDICINE CLINIC | Facility: CLINIC | Age: 24
End: 2024-05-16
Payer: COMMERCIAL

## 2024-05-16 DIAGNOSIS — R40.20 LOSS OF CONSCIOUSNESS: ICD-10-CM

## 2024-05-16 DIAGNOSIS — R55 SYNCOPE AND COLLAPSE: ICD-10-CM

## 2024-05-16 PROCEDURE — A9577 INJ MULTIHANCE: HCPCS | Performed by: NURSE PRACTITIONER

## 2024-05-16 PROCEDURE — 70553 MRI BRAIN STEM W/O & W/DYE: CPT

## 2024-05-16 PROCEDURE — 0 GADOBENATE DIMEGLUMINE 529 MG/ML SOLUTION: Performed by: NURSE PRACTITIONER

## 2024-05-16 RX ADMIN — GADOBENATE DIMEGLUMINE 20 ML: 529 INJECTION, SOLUTION INTRAVENOUS at 15:07

## 2024-05-16 NOTE — PROGRESS NOTES
1. 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  2. No acute intracranial abnormality is seen.

## 2024-05-20 ENCOUNTER — HOSPITAL ENCOUNTER (EMERGENCY)
Facility: HOSPITAL | Age: 24
Discharge: HOME | End: 2024-05-20
Attending: EMERGENCY MEDICINE | Admitting: EMERGENCY MEDICINE
Payer: COMMERCIAL

## 2024-05-20 ENCOUNTER — TELEPHONE (OUTPATIENT)
Dept: NEUROLOGY | Facility: CLINIC | Age: 24
End: 2024-05-20
Payer: COMMERCIAL

## 2024-05-20 VITALS
TEMPERATURE: 98.2 F | HEIGHT: 64 IN | WEIGHT: 221 LBS | RESPIRATION RATE: 16 BRPM | SYSTOLIC BLOOD PRESSURE: 116 MMHG | DIASTOLIC BLOOD PRESSURE: 76 MMHG | BODY MASS INDEX: 37.73 KG/M2 | OXYGEN SATURATION: 97 % | HEART RATE: 93 BPM

## 2024-05-20 DIAGNOSIS — R55 NEAR SYNCOPE: Primary | ICD-10-CM

## 2024-05-20 PROCEDURE — 99282 EMERGENCY DEPT VISIT SF MDM: CPT

## 2024-05-20 NOTE — TELEPHONE ENCOUNTER
Caller: RUDI    Relationship to patient: [facility]    Best call back number: [phone]    Chief complaint:   PT WAS SEEN TODAY, 5-20-24 IN ED HAVING 3 SEIZURE LIKE EPISODES/TREMORS.    Type of visit: NEW PT    Requested date: ASAP    If rescheduling, when is the original appointment:   7-12-24 AND ON THE WAIT LIST.    Additional notes:  DR HULL IN ED CALLED TO SEE IF PT CAN BE SEEN SOONER.

## 2024-05-21 NOTE — ED PROVIDER NOTES
Subjective   History of Present Illness  Patient presents with a complex complaint of episodes where she feels like she is about to pass out and her eyes rolled back and she feels very shaky and tremulous.  These episodes all started about 3 weeks ago.  He apparently does not lose consciousness completely or has not to this point.  However these episodes are becoming more frequent.  She says it started about 3 weeks ago but denies any change in her medical status or social status she is aware.  She denies any new stressors or fever or chills or head trauma.  She is now saying that sometimes episodes are also accompanied by some chest pain.  Her first episode was examined at [facility] where she had some lab work and EKG and was told everything was okay.  She has since had several more and is seeing her regular doctor and was sent to the hospital where she had an MRI and EEG performed and they were both normal.  However she is continue to have these episodes.  She had 3 today and her mother says these are now interfering with her job and her life.  She talked her regular doctor again today and had her to come to the emergency room and hopefully she could be admitted and get this determined as to what is going on.  They do have an appointment with neurology but is not until July 12 and I do not think we can wait that long because it is happening so frequently.    History provided by:  Patient and parent   used: No    Other  Location:  Generalized  Quality:  Near syncope and shaking  Severity:  Severe  Onset quality:  Sudden  Duration:  10 minutes  Timing:  Constant  Progression:  Resolved  Chronicity:  Recurrent  Associated symptoms: chest pain    Associated symptoms: no abdominal pain, no congestion, no cough, no diarrhea, no ear pain, no fatigue, no fever, no headaches, no loss of consciousness, no myalgias, no nausea, no rash, no rhinorrhea, no shortness of breath, no sore throat, no  vomiting and no wheezing        Review of Systems   Constitutional: Negative.  Negative for fatigue and fever.   HENT: Negative.  Negative for congestion, ear pain, rhinorrhea and sore throat.    Respiratory: Negative.  Negative for cough, shortness of breath and wheezing.    Cardiovascular:  Positive for chest pain.   Gastrointestinal: Negative.  Negative for abdominal pain, diarrhea, nausea and vomiting.   Genitourinary: Negative.    Musculoskeletal: Negative.  Negative for myalgias.   Skin: Negative.  Negative for rash.   Neurological:  Negative for loss of consciousness and headaches.   Psychiatric/Behavioral: Negative.     All other systems reviewed and are negative.      Past Medical History:   Diagnosis Date    Abdominal pain     r/t gallbladder    Acid reflux     Asthma     Migraines     Nausea     Sinusitis        Allergies   Allergen Reactions    Hydrocodone-Acetaminophen Itching    Amoxicillin Rash    Azithromycin Rash    Cefuroxime Rash     Rash    Hydrocodone Itching and Rash       Past Surgical History:   Procedure Laterality Date    CHOLECYSTECTOMY      CHOLECYSTECTOMY WITH INTRAOPERATIVE CHOLANGIOGRAM N/A 01/20/2017    Procedure: CHOLECYSTECTOMY, LAPAROSCOPIC;  Surgeon: Lo De La Torre MD;  Location: [facility];  Service:     TOOTH EXTRACTION      all top teeth removed    TYMPANOSTOMY TUBE PLACEMENT         History reviewed. No pertinent family history.    Social History     Socioeconomic History    Marital status: Single   Tobacco Use    Smoking status: Never    Smokeless tobacco: Never   Substance and Sexual Activity    Alcohol use: No    Drug use: No    Sexual activity: Never     Partners: Male     Birth control/protection: None           Objective   Physical Exam  Vitals and nursing note reviewed.   Constitutional:       Appearance: Normal appearance.   HENT:      Head: Normocephalic and atraumatic.   Eyes:      Extraocular Movements: Extraocular movements intact.      Pupils: Pupils are equal,  round, and reactive to light.   Cardiovascular:      Rate and Rhythm: Normal rate and regular rhythm.   Pulmonary:      Effort: Pulmonary effort is normal.      Breath sounds: Normal breath sounds.   Musculoskeletal:         General: Normal range of motion.   Neurological:      General: No focal deficit present.      Mental Status: She is alert and oriented to person, place, and time.   Psychiatric:         Mood and Affect: Mood normal.         Behavior: Behavior normal.         Procedures           ED Course                                             Medical Decision Making  I told the patient and her mother that she had already had more testing all over [facility] emergency room here.  This problem should not require hospitalization.  Adenoids bothering her a great deal but is not immediately life-threatening but any testing has been done at this point.  With could not admit just to speed up the process of her workup and still may not get an answer when she is in the hospital I explained to them the possibilities of pseudoseizures versus other unusual seizure disorder versus other reasons for syncope.  I pointed out there are multiple test that would need to be performed and he would not be able to do all of those in the hospital.  At the request I did speak with Dr. Padilla who is on-call for neurology.  He also did not feel like she should be admitted for this problem.  He said that she may require ambulatory EEG or video EEG and none of these are offered at our hospital.  I explained to her that something is change for this to be starting just 3 weeks ago and her symptoms are many and varied.  I told her to keep her appointment with neurology.  We try to call and see if they can move it up but they could not.  They do have her on a waiting list in case something should open up.  She is discharged in stable condition.    Problems Addressed:  Near syncope: acute illness or injury        Final diagnoses:   Near  syncope       ED Disposition  ED Disposition       ED Disposition   Discharge    Condition   Stable    Comment   --               Yvette Salazar, APRN  [address]  [address]  [facility] [address]  [phone]               Medication List      No changes were made to your prescriptions during this visit.            Ko Devine Jr., MD  05/21/24 8062     Is This A New Presentation, Or A Follow-Up?: Skin Lesion What Type Of Note Output Would You Prefer (Optional)?: Standard Output How Severe Is Your Skin Lesion?: moderate Has Your Skin Lesion Been Treated?: not been treated

## 2024-05-28 ENCOUNTER — TELEPHONE (OUTPATIENT)
Dept: NEUROLOGY | Age: 24
End: 2024-05-28

## 2024-05-28 NOTE — TELEPHONE ENCOUNTER
Called patients PCP back spoke with Taya explained that I will do my best to get her worked in sooner off the cancel wait list, Taya voiced understanding stated that she will let the patient know as well.

## 2024-05-28 NOTE — TELEPHONE ENCOUNTER
Taya w/patients PCP/referring office called to see if patient could be seen any sooner than the upcoming 8/5/24 appt either with Dr FERNANDEZ or EG if she is able to see patient. Taya states patient is having seizures daily at this time, multiple trips to the ED, she states possibly at least once here and several times to [facility] ED since referral was placed. Please contact Taya and or patient to advise of sooner appt if at all possible.

## 2024-05-29 ENCOUNTER — TELEPHONE (OUTPATIENT)
Dept: NEUROLOGY | Facility: CLINIC | Age: 24
End: 2024-05-29
Payer: COMMERCIAL

## 2024-05-29 NOTE — TELEPHONE ENCOUNTER
I called patient to let her know that her NP appt that was schedule on this Friday 5/31/2024 with Carmen José Miguel has been cancelled and rescheduled to Wednesday 6/05/204 at 1:00 pm.  I was unable to reach patient, I did leave a detailed voicemail with this information. Appt reminder has been mailed as well.    CC

## 2024-06-05 ENCOUNTER — LAB (OUTPATIENT)
Dept: LAB | Facility: HOSPITAL | Age: 24
End: 2024-06-05
Payer: COMMERCIAL

## 2024-06-05 ENCOUNTER — OFFICE VISIT (OUTPATIENT)
Dept: NEUROLOGY | Facility: CLINIC | Age: 24
End: 2024-06-05
Payer: COMMERCIAL

## 2024-06-05 VITALS
DIASTOLIC BLOOD PRESSURE: 80 MMHG | WEIGHT: 227 LBS | BODY MASS INDEX: 38.76 KG/M2 | SYSTOLIC BLOOD PRESSURE: 130 MMHG | RESPIRATION RATE: 18 BRPM | HEART RATE: 80 BPM | HEIGHT: 64 IN

## 2024-06-05 DIAGNOSIS — R56.9 SEIZURE-LIKE ACTIVITY: ICD-10-CM

## 2024-06-05 DIAGNOSIS — R55 SYNCOPE, UNSPECIFIED SYNCOPE TYPE: ICD-10-CM

## 2024-06-05 DIAGNOSIS — R56.9 SEIZURE-LIKE ACTIVITY: Primary | ICD-10-CM

## 2024-06-05 LAB
ALBUMIN SERPL-MCNC: 4.2 G/DL (ref 3.5–5.2)
ALBUMIN/GLOB SERPL: 1.6 G/DL
ALP SERPL-CCNC: 85 U/L (ref 39–117)
ALT SERPL W P-5'-P-CCNC: 25 U/L (ref 1–33)
AMPHET+METHAMPHET UR QL: NEGATIVE
AMPHETAMINES UR QL: NEGATIVE
ANION GAP SERPL CALCULATED.3IONS-SCNC: 12 MMOL/L (ref 5–15)
AST SERPL-CCNC: 19 U/L (ref 1–32)
BARBITURATES UR QL SCN: NEGATIVE
BASOPHILS # BLD AUTO: 0.09 10*3/MM3 (ref 0–0.2)
BASOPHILS NFR BLD AUTO: 1.1 % (ref 0–1.5)
BENZODIAZ UR QL SCN: NEGATIVE
BILIRUB SERPL-MCNC: 0.3 MG/DL (ref 0–1.2)
BUN SERPL-MCNC: 10 MG/DL (ref 6–20)
BUN/CREAT SERPL: 15.4 (ref 7–25)
BUPRENORPHINE SERPL-MCNC: NEGATIVE NG/ML
CALCIUM SPEC-SCNC: 9.3 MG/DL (ref 8.6–10.5)
CANNABINOIDS SERPL QL: NEGATIVE
CHLORIDE SERPL-SCNC: 102 MMOL/L (ref 98–107)
CO2 SERPL-SCNC: 24 MMOL/L (ref 22–29)
COCAINE UR QL: NEGATIVE
CREAT SERPL-MCNC: 0.65 MG/DL (ref 0.57–1)
DEPRECATED RDW RBC AUTO: 42.3 FL (ref 37–54)
EGFRCR SERPLBLD CKD-EPI 2021: 125.5 ML/MIN/1.73
EOSINOPHIL # BLD AUTO: 0.23 10*3/MM3 (ref 0–0.4)
EOSINOPHIL NFR BLD AUTO: 2.8 % (ref 0.3–6.2)
ERYTHROCYTE [DISTWIDTH] IN BLOOD BY AUTOMATED COUNT: 12.8 % (ref 12.3–15.4)
ETHANOL UR QL: <0.01 %
FENTANYL UR-MCNC: NEGATIVE NG/ML
GLOBULIN UR ELPH-MCNC: 2.7 GM/DL
GLUCOSE SERPL-MCNC: 98 MG/DL (ref 65–99)
HCT VFR BLD AUTO: 41.1 % (ref 34–46.6)
HGB BLD-MCNC: 14 G/DL (ref 12–15.9)
IMM GRANULOCYTES # BLD AUTO: 0.02 10*3/MM3 (ref 0–0.05)
IMM GRANULOCYTES NFR BLD AUTO: 0.2 % (ref 0–0.5)
LYMPHOCYTES # BLD AUTO: 1.44 10*3/MM3 (ref 0.7–3.1)
LYMPHOCYTES NFR BLD AUTO: 17.3 % (ref 19.6–45.3)
MAGNESIUM SERPL-MCNC: 2 MG/DL (ref 1.6–2.6)
MCH RBC QN AUTO: 30.8 PG (ref 26.6–33)
MCHC RBC AUTO-ENTMCNC: 34.1 G/DL (ref 31.5–35.7)
MCV RBC AUTO: 90.3 FL (ref 79–97)
METHADONE UR QL SCN: NEGATIVE
MONOCYTES # BLD AUTO: 0.56 10*3/MM3 (ref 0.1–0.9)
MONOCYTES NFR BLD AUTO: 6.7 % (ref 5–12)
NEUTROPHILS NFR BLD AUTO: 5.96 10*3/MM3 (ref 1.7–7)
NEUTROPHILS NFR BLD AUTO: 71.9 % (ref 42.7–76)
NRBC BLD AUTO-RTO: 0 /100 WBC (ref 0–0.2)
OPIATES UR QL: NEGATIVE
OXYCODONE UR QL SCN: NEGATIVE
PCP UR QL SCN: NEGATIVE
PLATELET # BLD AUTO: 311 10*3/MM3 (ref 140–450)
PMV BLD AUTO: 8.9 FL (ref 6–12)
POTASSIUM SERPL-SCNC: 4 MMOL/L (ref 3.5–5.2)
PROT SERPL-MCNC: 6.9 G/DL (ref 6–8.5)
RBC # BLD AUTO: 4.55 10*6/MM3 (ref 3.77–5.28)
SODIUM SERPL-SCNC: 138 MMOL/L (ref 136–145)
TRICYCLICS UR QL SCN: NEGATIVE
WBC NRBC COR # BLD AUTO: 8.3 10*3/MM3 (ref 3.4–10.8)

## 2024-06-05 PROCEDURE — 36415 COLL VENOUS BLD VENIPUNCTURE: CPT

## 2024-06-05 PROCEDURE — 80307 DRUG TEST PRSMV CHEM ANLYZR: CPT

## 2024-06-05 PROCEDURE — 85025 COMPLETE CBC W/AUTO DIFF WBC: CPT

## 2024-06-05 PROCEDURE — 80053 COMPREHEN METABOLIC PANEL: CPT

## 2024-06-05 PROCEDURE — 82077 ASSAY SPEC XCP UR&BREATH IA: CPT

## 2024-06-05 PROCEDURE — 83735 ASSAY OF MAGNESIUM: CPT

## 2024-06-05 RX ORDER — SERTRALINE HYDROCHLORIDE 100 MG/1
150 TABLET, FILM COATED ORAL DAILY
Qty: 135 TABLET | Refills: 1 | Status: SHIPPED | OUTPATIENT
Start: 2024-06-05 | End: 2024-09-03

## 2024-06-05 RX ORDER — SERTRALINE HYDROCHLORIDE 200 MG/1
200 CAPSULE ORAL DAILY
Qty: 90 CAPSULE | Refills: 1 | Status: SHIPPED | OUTPATIENT
Start: 2024-06-05 | End: 2024-06-05

## 2024-06-05 NOTE — PROGRESS NOTES
Neurology Consult Note    [facility]  [address], [address]  [place] [address]  Phone: [phone]  Fax: [phone]    Referring Provider:   PAULY Pickett  Primary Care Provider:  Yvette Salazar APRN    Reason for Consult:  Seizure Like Activity  Subjective      Lucrecia Lloyd presents to [facility]    History of Present Illness  23-year-old female referred to our clinic by Yvette Salazar APRN for seizure-like activity.  Patient was seen by her PCP on 5/3/2024 for ER follow-up.  Reported patient was in the [facility] emergency room and [facility] ER after having a syncopal episode associate with headache and visual changes.  She did have a negative workup for etiology.  She was found to have a strep infection involving her throat as well as an ear infection.  These were treated.    Patient was seen in our emergency room on 5/2/2024.  Did review that record and shows patient presented to the emergency room after having 2 syncopal episodes.  1 was just prior to arrival.  Reported she had fell backwards on her bed that event occurred a few days prior.  Patient reported she was having chest pain.    Patient was in the emergency room again on 5/20/2024.  Review that record shows patient is having continued near syncopal episodes.  Physician documented patient does not lose consciousness completely.  Is becoming more frequent.  Patient denies any stressors.  No episodes or associated chest pain.  She was ultimately discharged home.    Her PCP had contacted our  to move patient's schedule up.    Today patient presents with her mother.  Reports to me an onset of symptoms that started on April 29, 2024 after having strep throat.  Patient reports she got up normal that morning.  She was having trouble seeing and describes her vision is really blurry.  Mother reports she had passed out.  Does not last very long maybe less than a  minute.  Additionally patient had arm tingling in her arms felt numb.  Mother laid patient down on the bed.  Since then she notes her episodes have increased in frequency upwards of 4 times a day for approximately 3 weeks now.  I did ask about infection.  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    During the episodes patient can urinate on herself after the event has happened while trying to the bathroom.  No bowel incontinence.  No tongue injuries.  Fortunately I was able to see a video that her mother had recorded.    Video #1: Patient was lying in bed.  Patient's eyes were wide open.  She did have a blank stare on her face.  Mother was asking the patient questions.  Patient would answer in a stuttering response.  She was able to tell her birthday and her name and the video.  Additionally she was able to identify one of her cats.    Video #2: Patient was seen with jerking motions of her head.  Unfortunately able to see her arms or legs on the video.  Patient's eyes were blinking with the jerks.  There was no cyanosis or color change.  Episode lasted approximately 1 minute.    It was noted during the rooming encounter that patient had stopped her Lamictal.  This was further investigated by myself.  Patient reports he was prescribed Lamictal by a a psychiatrist for the treatment of a mood disorder.  This was stopped in April 2024 due to experiencing nausea with an increased dose at 150 mg daily.  It appears symptom started after that.  I did ask patient about triggers.  She has identified triggers to include stress or she gets really upset.  Additionally a lot of activity can make symptoms occur.    She also reports experiencing a lot of chest pain.  This is described as midsternal.  It does radiate into the right side of her chest.  She does describe it as her heart beating really fast.  She feels all of her symptoms are localized to the right side of her body.  She notes she is  "right-handed.    She also notes experiencing headaches with the symptoms.  She notes that headaches are all over.  She states \"it feels like my knees are going to explode in my head to\".  Patient describes the pain as a throbbing.  It is associated with light and sound sensitivities as well as nausea.  She is currently taking Ubrelvy as an abortive agent.  She was tried on Emgality as a preventative regimen was unable to self inject the medication due to causing pain.      Currently she is not driving.  She does not have a 's license.  Patient also notes with activity at work she will have hand pain.  Example provided she was cutting paper for approximately 5 minutes with a pair of scissors and her hand began to hurt.  She thinks she is losing muscle tone in her right arm as well.  No issues with fine motor movements such as writing.      She does have a history of head trauma.  Reports approximately a year ago she was walking at work when she slipped on a wet floor.  She states she hit her head on the floor.  Reports she hit her head so hard it bounced off the floor.    Additionally after an episode patient reports she had trouble walking.  Sometimes she has to crawl to the house.  During the postictal phase patient can be nonverbal.  This can last for several hours or even a day.  However during that time patient is able to read text message and comprehend that.  She is able to respond back on her phone.  She notes since her episode started on April 29 she is only had 1 day where she has not had a an event.  Patient Active Problem List   Diagnosis    Obesity with body mass index 30 or greater    Asthma    Attention deficit hyperactivity disorder    Bilateral sensorineural hearing loss    Bilateral temporomandibular joint pain    Depressive disorder    Disorder of ear    Disorder of skin or subcutaneous tissue    Dizziness and giddiness    Generalized social phobia    Generalized anxiety disorder    " Gastroesophageal reflux disease    Migraine without aura and responsive to treatment    Irregular menstrual cycle    Hypoglycemia    History of chronic ear infection        Past Medical History:   Diagnosis Date    Abdominal pain     r/t gallbladder    Acid reflux     Asthma     Migraines     Nausea     Sinusitis         Social History     Socioeconomic History    Marital status: Single   Tobacco Use    Smoking status: Never    Smokeless tobacco: Never   Vaping Use    Vaping status: Never Used   Substance and Sexual Activity    Alcohol use: No    Drug use: No    Sexual activity: Never     Partners: Male     Birth control/protection: None        Allergies   Allergen Reactions    Hydrocodone-Acetaminophen Itching    Amoxicillin Rash    Azithromycin Rash    Cefuroxime Rash     Rash    Hydrocodone Itching and Rash          Current Outpatient Medications:     albuterol (PROVENTIL) (2.5 MG/3ML) 0.083% nebulizer solution, Take 2.5 mg by nebulization Every 4 (Four) Hours As Needed for Wheezing., Disp: 100 mL, Rfl: 12    albuterol sulfate  (90 Base) MCG/ACT inhaler, Inhale 2 puffs As Needed for Wheezing., Disp: 18 g, Rfl: 3    EPINEPHrine (EPIPEN) 0.3 MG/0.3ML solution auto-injector injection, , Disp: , Rfl:     Fluticasone-Salmeterol (ADVAIR/WIXELA) 250-50 MCG/ACT DISKUS, Inhale 1 puff 2 (Two) Times a Day., Disp: 60 each, Rfl: 2    montelukast (SINGULAIR) 10 MG tablet, Take 1 tablet by mouth Daily., Disp: 90 tablet, Rfl: 1    neomycin-polymyxin-hydrocortisone (CORTISPORIN) 1 % solution otic solution, Administer 4 drops into the left ear 4 (Four) Times a Day., Disp: 10 mL, Rfl: 1    ofloxacin (FLOXIN) 0.3 % otic solution, Administer 5 drops into both ears Daily., Disp: 10 mL, Rfl: 2    ondansetron (ZOFRAN) 4 MG tablet, Take 1 tablet by mouth Every 8 (Eight) Hours As Needed for Nausea or Vomiting., Disp: 9 tablet, Rfl: 1    pantoprazole (PROTONIX) 40 MG EC tablet, Take 1 tablet by mouth., Disp: , Rfl:     promethazine  "(PHENERGAN) 25 MG tablet, Take 1 tablet by mouth Every 6 (Six) Hours As Needed for Nausea or Vomiting., Disp: 10 tablet, Rfl: 2    ubrogepant (Ubrelvy) 100 MG tablet, TAKE 1 TABLET BY MOUTH AT ONSET OF HEADACHE; MAY REPEAT 1 TABLET IN 2 HOURS IF NEEDED. MAX OF 2 TABLETS IN A 24 HOUR PERIOD, Disp: 10 tablet, Rfl: 2    Atogepant (Qulipta) 60 MG tablet, Take 1 tablet by mouth Daily. (Patient not taking: Reported on 6/5/2024), Disp: 30 tablet, Rfl: 1    clindamycin (Cleocin) 300 MG capsule, Take 1 capsule by mouth 2 (Two) Times a Day., Disp: 20 capsule, Rfl: 0    Sertraline HCl 200 MG capsule, Take 200 mg by mouth Daily., Disp: 90 capsule, Rfl: 1       Objective   Vital Signs:   /80 (BP Location: Left arm, Patient Position: Sitting)   Pulse 80   Resp 18   Ht 162.6 cm (64\")   Wt 103 kg (227 lb)   BMI 38.96 kg/m²       Physical Exam  Vitals and nursing note reviewed.   Constitutional:       Appearance: Normal appearance.   HENT:      Head: Normocephalic.   Eyes:      General: Lids are normal.      Extraocular Movements: Extraocular movements intact.   Pulmonary:      Effort: Pulmonary effort is normal. No respiratory distress.   Skin:     General: Skin is warm and dry.   Neurological:      Mental Status: She is alert.      Motor: Motor strength is normal.     Deep Tendon Reflexes: Reflexes are normal and symmetric.   Psychiatric:         Speech: Speech normal.        Neurological Exam  Mental Status  Alert. Oriented to person, place, time and situation. Speech is normal.    Cranial Nerves  CN II: Visual fields full to confrontation.  CN III, IV, VI: Extraocular movements intact bilaterally. Normal lids and orbits bilaterally.  CN V: Facial sensation is normal.  CN VII: Full and symmetric facial movement.  CN IX, X: Palate elevates symmetrically  CN XII: Tongue midline without atrophy or fasciculations.    Motor  Normal muscle bulk throughout. No fasciculations present. Normal muscle tone. No abnormal " involuntary movements. Strength is 5/5 throughout all four extremities.  Some giveaway weakness noted to lower extremities to knee flexion.    Sensory  Light touch abnormality: Patient reports with light touch to her right leg she can have pain on both sides.  Described as achy.  However she is able to feel me touch both sides of her legs symmetrically.     Reflexes  Deep tendon reflexes are 2+ and symmetric in all four extremities.    Coordination  Right: Finger-to-nose normal.Left: Finger-to-nose normal.    Gait  Casual gait is normal including stance, stride, and arm swing.  Patient's gait in room did appear wide-based with arms outstretched for balance.  Walking out of room it was noted patient had normal stride with normal arm swing..      Result Review :   The following data was reviewed by: KRISTEN Hairston on 06/05/2024:         MRI Brain With & Without Contrast (05/16/2024 15:07)     Study Result    Narrative & Impression   EXAMINATION: MRI BRAIN W WO CONTRAST-     5/16/2024 1:22 PM     HISTORY: syncope and collapse; R55-Syncope and collapse;  R40.20-Unspecified coma     Brain MRI without and with IV gadolinium contrast.  Axial, sagittal, and coronal sequences.     COMPARISON:  Noncontrast head CT from 12/29/2022.     No acute ischemia based on the DWI sequence.     Normal midline structures based on the sagittal T1-weighted sequence.     There is no brain hemorrhage or abnormal calcification based on the  gradient-echo sequence.     No significant FLAIR signal abnormality.     T2-weighted sequence shows essentially clear paranasal sinuses.  Normal ventricle size.  No significant atrophy.     Postcontrast images show no abnormal brain enhancement.  No mass lesion and no sign of vasculitis.  The dural venous sinuses are patent.     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     No mass effect and no hydrocephalus.     IMPRESSION:  1. 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  2. No acute  intracranial abnormality is seen.     IMPRESSION:  1. No acute intracranial abnormality is seen.              This report was signed and finalized on 5/16/2024 3:12 PM by Dr. Rodney Nunez MD.       [facility] HOSP RECORDS 4/29/24 (05/07/2024)     EEG (05/14/2024 15:20)     ED Provider Notes by Ko Devine Jr., MD (05/20/2024 13:29)  ED Provider Notes by Houston Villeda MD (05/02/2024 20:47)  Progress Notes by Yvette Salazar APRN (05/03/2024 12:45)                Impression:  Lucrecia Lloyd is a 23 y.o. female who presents for evaluation of seizure-like activity.  After seeing the videos her mother obtained the patient's events, I am not entirely certain this represents a epileptic event.  She does have some atypical features during the events that raise my suspicion.  Certainly with stopping the Lamictal in April 2024 could raise concern for a psychogenic nonepileptic seizure disorder.  I would think it be beneficial since she has had increased frequency of upwards of 4 times a day to repeat her EEG to assess for any epileptic activities.  Additionally this is normal we could consider referral to [facility] neurology for an EMU stay.  Fortunately she does have an appointment set up with Dr. Parham already.  I did instruct patient to keep that appointment as he has an epileptologist and may provide better workup for her condition.  Additionally with her complaints of chest pain during the events elected proceed with a Holter monitor for 14 days as well as a echocardiogram.  This will help me to further assess the near syncopal events and rule out other causes.  Addition we will proceed with serum studies to try to identify etiologies.    Diagnoses and all orders for this visit:    1. Seizure-like activity (Primary)  -     Holter Monitor - 72 Hour Up To 15 Days; Future  -     EEG; Future  -     Adult Transthoracic Echo Complete W/ Cont if Necessary Per Protocol; Future  -     Magnesium;  Future  -     Comprehensive Metabolic Panel; Future  -     CBC & Differential; Future  -     Urine Drug Screen - Urine, Clean Catch; Future  -     Ethanol; Future    2. Syncope, unspecified syncope type  -     Holter Monitor - 72 Hour Up To 15 Days; Future  -     EEG; Future  -     Adult Transthoracic Echo Complete W/ Cont if Necessary Per Protocol; Future  -     Magnesium; Future  -     Comprehensive Metabolic Panel; Future  -     CBC & Differential; Future  -     Urine Drug Screen - Urine, Clean Catch; Future  -     Ethanol; Future        Plan:  I would not initiate antiepileptic medication at this time.  EEG  Holter monitor for 14 days  Echocardiogram  CBC  CMP  Magnesium  Urine drug screen  Ethanol  Follow seizure precautions to include  No driving or operating motor vehicles  No operating machinery  No climbing ladders or working at heights  No use of power tools or power equipment  No use of cuttings instruments  No use of open flames or heating sources  No tub baths  No use of swimming pools or hot tubs  Keep appointment with Dr. Parham  Follow with PCP as scheduled  Follow-up in our clinic in 3 months or sooner if needed    The patient and I have discussed the plan of care and she is in full agreement at this time.   I spent a total of 70 minutes for this encounter.  This includes reviewing 3 ER visits, PCP records, as well as testing already performed, obtaining a thorough HPI, assessment of patient, developing a plan of care with the patient and her mother, patient and mother discussion, patient mother education as well as documentation.  Follow Up   Return in about 3 months (around 9/5/2024) for Seizure.            KRISTEN Hairston  06/05/24  14:10 CDT

## 2024-06-07 ENCOUNTER — PATIENT MESSAGE (OUTPATIENT)
Dept: NEUROLOGY | Facility: CLINIC | Age: 24
End: 2024-06-07
Payer: COMMERCIAL

## 2024-06-07 NOTE — TELEPHONE ENCOUNTER
Explained that it is a date put on the order, the test doesn't have to be done before then.  The referrals have been sent and someone should call her to schedule them.

## 2024-06-12 ENCOUNTER — LAB (OUTPATIENT)
Dept: LAB | Facility: HOSPITAL | Age: 24
End: 2024-06-12
Payer: COMMERCIAL

## 2024-06-12 ENCOUNTER — HOSPITAL ENCOUNTER (OUTPATIENT)
Dept: GENERAL RADIOLOGY | Facility: HOSPITAL | Age: 24
Discharge: HOME | End: 2024-06-12
Payer: COMMERCIAL

## 2024-06-12 ENCOUNTER — OFFICE VISIT (OUTPATIENT)
Dept: FAMILY MEDICINE CLINIC | Facility: CLINIC | Age: 24
End: 2024-06-12
Payer: COMMERCIAL

## 2024-06-12 VITALS
HEART RATE: 103 BPM | WEIGHT: 228.6 LBS | SYSTOLIC BLOOD PRESSURE: 114 MMHG | BODY MASS INDEX: 39.03 KG/M2 | DIASTOLIC BLOOD PRESSURE: 79 MMHG | TEMPERATURE: 99.3 F | OXYGEN SATURATION: 97 % | HEIGHT: 64 IN

## 2024-06-12 DIAGNOSIS — R50.9 FEVER, UNSPECIFIED FEVER CAUSE: Primary | ICD-10-CM

## 2024-06-12 DIAGNOSIS — R50.9 FEVER, UNSPECIFIED FEVER CAUSE: ICD-10-CM

## 2024-06-12 DIAGNOSIS — R56.9 SEIZURE-LIKE ACTIVITY: ICD-10-CM

## 2024-06-12 DIAGNOSIS — R32 URINARY INCONTINENCE, UNSPECIFIED TYPE: ICD-10-CM

## 2024-06-12 DIAGNOSIS — G25.9 ABNORMAL LEG MOVEMENT: ICD-10-CM

## 2024-06-12 DIAGNOSIS — R06.02 SHORTNESS OF BREATH: ICD-10-CM

## 2024-06-12 LAB
ALBUMIN SERPL-MCNC: 4.2 G/DL (ref 3.5–5)
ALBUMIN/GLOB SERPL: 1.2 G/DL (ref 1.1–2.5)
ALP SERPL-CCNC: 77 U/L (ref 24–120)
ALT SERPL W P-5'-P-CCNC: 28 U/L (ref 0–35)
ANION GAP SERPL CALCULATED.3IONS-SCNC: 13 MMOL/L (ref 4–13)
AST SERPL-CCNC: 26 U/L (ref 7–45)
AUTO MIXED CELLS #: 0.7 10*3/MM3 (ref 0.1–2.6)
AUTO MIXED CELLS %: 10.9 % (ref 0.1–24)
B PARAPERT DNA SPEC QL NAA+PROBE: NOT DETECTED
B PERT DNA SPEC QL NAA+PROBE: NOT DETECTED
BILIRUB SERPL-MCNC: 0.3 MG/DL (ref 0.1–1)
BILIRUB UR QL STRIP: NEGATIVE
BUN SERPL-MCNC: 10 MG/DL (ref 5–21)
BUN/CREAT SERPL: 12.8
C PNEUM DNA NPH QL NAA+NON-PROBE: NOT DETECTED
CALCIUM SPEC-SCNC: 9.5 MG/DL (ref 8.6–10.5)
CHLORIDE SERPL-SCNC: 102 MMOL/L (ref 98–110)
CLARITY UR: CLEAR
CO2 SERPL-SCNC: 23 MMOL/L (ref 24–31)
COLOR UR: YELLOW
CREAT SERPL-MCNC: 0.78 MG/DL (ref 0.5–1.4)
D-LACTATE SERPL-SCNC: 1.1 MMOL/L (ref 0.5–2)
EGFRCR SERPLBLD CKD-EPI 2021: 108.3 ML/MIN/1.73
ERYTHROCYTE [DISTWIDTH] IN BLOOD BY AUTOMATED COUNT: 13 % (ref 12.3–15.4)
ERYTHROCYTE [SEDIMENTATION RATE] IN BLOOD: 9 MM/HR (ref 0–20)
FLUAV SUBTYP SPEC NAA+PROBE: NOT DETECTED
FLUBV RNA ISLT QL NAA+PROBE: NOT DETECTED
GLOBULIN UR ELPH-MCNC: 3.4 GM/DL
GLUCOSE SERPL-MCNC: 89 MG/DL (ref 70–100)
GLUCOSE UR STRIP-MCNC: NEGATIVE MG/DL
HADV DNA SPEC NAA+PROBE: NOT DETECTED
HCOV 229E RNA SPEC QL NAA+PROBE: NOT DETECTED
HCOV HKU1 RNA SPEC QL NAA+PROBE: NOT DETECTED
HCOV NL63 RNA SPEC QL NAA+PROBE: NOT DETECTED
HCOV OC43 RNA SPEC QL NAA+PROBE: NOT DETECTED
HCT VFR BLD AUTO: 44 % (ref 34–46.6)
HGB BLD-MCNC: 14.5 G/DL (ref 12–15.9)
HGB UR QL STRIP.AUTO: NEGATIVE
HMPV RNA NPH QL NAA+NON-PROBE: NOT DETECTED
HPIV1 RNA ISLT QL NAA+PROBE: NOT DETECTED
HPIV2 RNA SPEC QL NAA+PROBE: NOT DETECTED
HPIV3 RNA NPH QL NAA+PROBE: NOT DETECTED
HPIV4 P GENE NPH QL NAA+PROBE: NOT DETECTED
KETONES UR QL STRIP: NEGATIVE
LEUKOCYTE ESTERASE UR QL STRIP.AUTO: NEGATIVE
LYMPHOCYTES # BLD AUTO: 1.6 10*3/MM3 (ref 0.7–3.1)
LYMPHOCYTES NFR BLD AUTO: 25 % (ref 19.6–45.3)
M PNEUMO IGG SER IA-ACNC: NOT DETECTED
MCH RBC QN AUTO: 30.6 PG (ref 26.6–33)
MCHC RBC AUTO-ENTMCNC: 33 G/DL (ref 31.5–35.7)
MCV RBC AUTO: 92.8 FL (ref 79–97)
NEUTROPHILS NFR BLD AUTO: 4.1 10*3/MM3 (ref 1.7–7)
NEUTROPHILS NFR BLD AUTO: 64.1 % (ref 42.7–76)
NITRITE UR QL STRIP: NEGATIVE
PH UR STRIP.AUTO: 6 [PH] (ref 5–8)
PLATELET # BLD AUTO: 360 10*3/MM3 (ref 140–450)
PMV BLD AUTO: 8 FL (ref 6–12)
POTASSIUM SERPL-SCNC: 3.9 MMOL/L (ref 3.5–5.3)
PROT SERPL-MCNC: 7.6 G/DL (ref 6.3–8.7)
PROT UR QL STRIP: NEGATIVE
RBC # BLD AUTO: 4.74 10*6/MM3 (ref 3.77–5.28)
RHINOVIRUS RNA SPEC NAA+PROBE: NOT DETECTED
RSV RNA NPH QL NAA+NON-PROBE: NOT DETECTED
SARS-COV-2 RNA NPH QL NAA+NON-PROBE: NOT DETECTED
SODIUM SERPL-SCNC: 138 MMOL/L (ref 135–145)
SP GR UR STRIP: 1.01 (ref 1–1.03)
UROBILINOGEN UR QL STRIP: NORMAL
WBC NRBC COR # BLD AUTO: 6.4 10*3/MM3 (ref 3.4–10.8)

## 2024-06-12 PROCEDURE — 80053 COMPREHEN METABOLIC PANEL: CPT

## 2024-06-12 PROCEDURE — 99214 OFFICE O/P EST MOD 30 MIN: CPT | Performed by: NURSE PRACTITIONER

## 2024-06-12 PROCEDURE — 0202U NFCT DS 22 TRGT SARS-COV-2: CPT

## 2024-06-12 PROCEDURE — 83605 ASSAY OF LACTIC ACID: CPT

## 2024-06-12 PROCEDURE — 82550 ASSAY OF CK (CPK): CPT

## 2024-06-12 PROCEDURE — 82553 CREATINE MB FRACTION: CPT

## 2024-06-12 PROCEDURE — 93000 ELECTROCARDIOGRAM COMPLETE: CPT | Performed by: NURSE PRACTITIONER

## 2024-06-12 PROCEDURE — 71046 X-RAY EXAM CHEST 2 VIEWS: CPT

## 2024-06-12 PROCEDURE — 85025 COMPLETE CBC W/AUTO DIFF WBC: CPT

## 2024-06-12 PROCEDURE — 85652 RBC SED RATE AUTOMATED: CPT

## 2024-06-12 PROCEDURE — 84145 PROCALCITONIN (PCT): CPT

## 2024-06-12 PROCEDURE — 36415 COLL VENOUS BLD VENIPUNCTURE: CPT

## 2024-06-12 PROCEDURE — 81003 URINALYSIS AUTO W/O SCOPE: CPT

## 2024-06-12 NOTE — PROGRESS NOTES
So far all labs look great.   Resp panel is negative.   Urine is clear    Some labs pending.. will call once available

## 2024-06-12 NOTE — PROGRESS NOTES
"Chief Complaint  Fever, Nasal Congestion, Shortness of Breath, and Generalized Body Aches    Subjective    History of Present Illness      Patient presents to [facility] for   History of Present Illness  Pt presents today with Fever, Nasal Congestion, Shortness of Breath, and Generalized Body Aches. Ongoing since this morning and tried nyquil        Review of Systems    I have reviewed and agree with the HPI information as above.  Yvette Salazar, APRN     Objective   Vital Signs:   /79   Pulse 103   Temp 99.3 °F (37.4 °C)   Ht 162.6 cm (64.02\")   Wt 104 kg (228 lb 9.6 oz)   SpO2 97%   BMI 39.22 kg/m²     Class 2 Severe Obesity (BMI >=35 and <=39.9). Obesity-related health conditions include the following: GERD. Obesity is unchanged. BMI is is above average; BMI management plan is completed. We discussed portion control and increasing exercise.      Physical Exam  Vitals and nursing note reviewed.   Constitutional:       Appearance: Normal appearance. She is well-developed.   HENT:      Head: Normocephalic and atraumatic.      Right Ear: Tympanic membrane, ear canal and external ear normal. A PE tube is present. Tympanic membrane is erythematous.      Left Ear: Tympanic membrane, ear canal and external ear normal. Tympanic membrane is erythematous.      Nose: Nose normal. No septal deviation, nasal tenderness or congestion.      Mouth/Throat:      Lips: Pink. No lesions.      Mouth: Mucous membranes are moist. No oral lesions.      Dentition: Normal dentition.      Pharynx: Oropharynx is clear. No pharyngeal swelling, oropharyngeal exudate or posterior oropharyngeal erythema.   Eyes:      General: Lids are normal. Vision grossly intact. No scleral icterus.        Right eye: No discharge.         Left eye: No discharge.      Extraocular Movements: Extraocular movements intact.      Conjunctiva/sclera: Conjunctivae normal.      Right eye: Right conjunctiva is not " injected.      Left eye: Left conjunctiva is not injected.      Pupils: Pupils are equal, round, and reactive to light.   Neck:      Thyroid: No thyroid mass.      Trachea: Trachea normal.   Cardiovascular:      Rate and Rhythm: Normal rate and regular rhythm.      Heart sounds: Normal heart sounds. No murmur heard.     No gallop.   Pulmonary:      Effort: Pulmonary effort is normal.      Breath sounds: Normal breath sounds and air entry. No wheezing, rhonchi or rales.   Musculoskeletal:         General: No tenderness or deformity. Normal range of motion.      Cervical back: Full passive range of motion without pain, normal range of motion and neck supple.      Thoracic back: Normal.      Right lower leg: No edema.      Left lower leg: No edema.   Skin:     General: Skin is warm and dry.      Coloration: Skin is not jaundiced.      Findings: No rash.   Neurological:      Mental Status: She is alert and oriented to person, place, and time.      Sensory: Sensation is intact.      Motor: Motor function is intact. Weakness present.      Coordination: Coordination is intact.      Gait: Gait is intact. Gait abnormal.      Deep Tendon Reflexes: Reflexes are normal and symmetric.      Comments: Walking with cane   Psychiatric:         Mood and Affect: Mood and affect normal.         Behavior: Behavior normal.         Judgment: Judgment normal.             Result Review  Data Reviewed:              ECG 12 Lead    Date/Time: 6/12/2024 12:08 PM  Performed by: Yvette Salazar APRN    Authorized by: Yvette Salazar APRN  Comparison: compared with previous ECG from 5/2/2024  Similar to previous ECG  Rhythm: sinus rhythm  BPM: 83    Clinical impression: normal ECG            Assessment and Plan      Diagnoses and all orders for this visit:    1. Fever, unspecified fever cause (Primary)  -     Respiratory Panel PCR w/COVID-19(SARS-CoV-2) [facility]/JUAN MIGUEL/[facility]AN/[facility] In-House, NP Swab in UTM/VTM, 2 HR TAT - Swab,  Nasopharynx; Future  -     CBC Auto Differential; Future  -     Comprehensive Metabolic Panel; Future  -     Urinalysis With Culture If Indicated -; Future  -     Procalcitonin; Future  -     Sedimentation rate, automated; Future  -     CK; Future  -     CK MB; Future  -     Lactic Acid, Plasma; Future  -     XR Chest 2 View; Future    2. Seizure-like activity    3. Abnormal leg movement    4. Urinary incontinence, unspecified type    5. Shortness of breath    Other orders  -     ECG 12 Lead    Very pleasant but complicated patient here today.  I have been seeing her over the last few weeks due to seizure-like activity.  She has been seen in ER 2-3 times.  She also recently saw Mr. Morales at neurology at [facility].  She also has a appointment with [facility] neurology with Dr. Parham.  However she woke up this morning with a fever of 101 with no other specific symptoms.  Mom became concerned and called and brought her into the office.  Mom did show me videos of her recent seizure-like episodes.  They do appear to be getting worse.  They are becoming more frequent with at least 4 episodes a day.  Mr. Morales has already reordered an EEG and started a cardiac workup on her.  It is also noted that her right leg is constantly jerking and moving.  She almost has a decorticate look to the left hand also.  Patient is also having to use a cane now to walk due to a very unsteady and jerking like gait.  When these episodes are also occurring she does have loss of bladder at times.  Mom states that she is becoming more and more forgetful and has a foggy memory.  I am very concerned with this patient.  I do feel like she may need a spinal tap at some point.  Will send over my notes to Mr. Morales for him to review the labs and chest x-ray.  Will also do a respiratory panel, labs, chest x-ray at this time to try to find the reason for the fever.  Plan  1.  Labs today  2.  Respiratory panel  3.  Chest x-ray        Follow Up   Return if  symptoms worsen or fail to improve.  Patient was given instructions and counseling regarding her condition or for health maintenance advice. Please see specific information pulled into the AVS if appropriate.

## 2024-06-13 ENCOUNTER — TELEPHONE (OUTPATIENT)
Dept: FAMILY MEDICINE CLINIC | Facility: CLINIC | Age: 24
End: 2024-06-13
Payer: COMMERCIAL

## 2024-06-13 ENCOUNTER — TELEPHONE (OUTPATIENT)
Dept: NEUROLOGY | Facility: HOSPITAL | Age: 24
End: 2024-06-13
Payer: COMMERCIAL

## 2024-06-13 LAB
CK MB SERPL-CCNC: 1.66 NG/ML
CK SERPL-CCNC: 128 U/L (ref 20–180)
PROCALCITONIN SERPL-MCNC: 0.03 NG/ML (ref 0–0.25)

## 2024-06-13 NOTE — TELEPHONE ENCOUNTER
Left voicemail for call back. Also sent results/recommendations via [facility] TO RELAY  I tried to reach you by phone but was unsuccessful.     All your lab work and your chest x-ray were normal. Your respiratory panel was negative. Do you still have a fever?

## 2024-06-14 ENCOUNTER — HOSPITAL ENCOUNTER (OUTPATIENT)
Dept: CARDIOLOGY | Facility: HOSPITAL | Age: 24
Discharge: HOME | End: 2024-06-14
Payer: COMMERCIAL

## 2024-06-14 ENCOUNTER — HOSPITAL ENCOUNTER (OUTPATIENT)
Dept: NEUROLOGY | Facility: HOSPITAL | Age: 24
Discharge: HOME | End: 2024-06-14
Payer: COMMERCIAL

## 2024-06-14 DIAGNOSIS — R56.9 SEIZURE-LIKE ACTIVITY: ICD-10-CM

## 2024-06-14 DIAGNOSIS — R55 SYNCOPE, UNSPECIFIED SYNCOPE TYPE: ICD-10-CM

## 2024-06-14 PROCEDURE — 95816 EEG AWAKE AND DROWSY: CPT

## 2024-06-14 PROCEDURE — 93246 EXT ECG>7D<15D RECORDING: CPT

## 2024-06-17 NOTE — PROGRESS NOTES
EEG did not show epileptic activity. Patient did have episode captured which did not correlate on EEG. Recommended her to followup with provider who stopped her Lamictal. Higher suspicion for psychogenic nonepileptic seizures.  Additionally recommend follow-up with Dr. Parham as she is currently scheduled.

## 2024-06-18 ENCOUNTER — DOCUMENTATION (OUTPATIENT)
Dept: NEUROLOGY | Facility: CLINIC | Age: 24
End: 2024-06-18
Payer: COMMERCIAL

## 2024-06-18 DIAGNOSIS — R56.9 SEIZURE-LIKE ACTIVITY: ICD-10-CM

## 2024-06-18 DIAGNOSIS — R26.9 ABNORMAL GAIT: Primary | ICD-10-CM

## 2024-06-18 DIAGNOSIS — R41.0 CONFUSION: ICD-10-CM

## 2024-06-18 DIAGNOSIS — R47.89 WORD FINDING DIFFICULTY: ICD-10-CM

## 2024-06-18 NOTE — PROGRESS NOTES
I have called and spoke with the patient.  She has contacted office in regards to having continued symptoms.  She notes now she is progressing to having shaking over her whole body.  This started approximate week ago.  She notes that time she cannot be coherent during these episodes.  She is no longer seeing her psychiatrist Dr. Johann Godoy.  She tells me today that she had stopped her Lamictal about a year ago.  Triggering event still can include stress and increase stimulus however she is also noticed symptoms when she first wakes up.    Did review her EEG with her over the phone.  She did have an episode of abnormal eye movements and mouth movements which did not correlate with any epileptic activities.  Additionally she had right leg jerking movements which did not correlate with any epileptic activity.    Additionally reviewed her MRI of her brain results together.  There is no sign of any demyelinating lesions.  She had question whether she could have multiple sclerosis.  I advised her this is not in the differential at this point she has no active signs on her MRI of her brain.    She also reported issues with a fever recently.  She was told she may have needed a lumbar puncture.  I informed her at this time she does not need a lumbar puncture as she has no concerning features that warrant this.  She has no rigidity of her neck or significant mental status changes.  Additionally her lab work performed less than a week ago was unremarkable as well.    At this current point, would not recommend initiating antiepileptic medications as she does have recorded events with a video EEG that do not correlate with any signs of epileptic activity.  At this point I would like to continue with her workup and await results of a Holter monitor and a cardiac echo.  Additionally she has an appointment set with Dr. Parham.  I would recommend patient to call and try get her appointment moved up sooner.  Patient may benefit from  an EMU stay and evaluation from the epileptologist at [facility].    All questions were answered today the patient had.  She voiced understanding of the treatment plan.      KRISTEN Purcell  [facility] neurology

## 2024-06-20 ENCOUNTER — LAB (OUTPATIENT)
Dept: LAB | Facility: HOSPITAL | Age: 24
End: 2024-06-20
Payer: COMMERCIAL

## 2024-06-20 DIAGNOSIS — M62.838 MUSCLE SPASM: Primary | ICD-10-CM

## 2024-06-20 DIAGNOSIS — M62.838 MUSCLE SPASM: ICD-10-CM

## 2024-06-20 PROCEDURE — 36415 COLL VENOUS BLD VENIPUNCTURE: CPT

## 2024-06-20 PROCEDURE — 86341 ISLET CELL ANTIBODY: CPT

## 2024-06-21 DIAGNOSIS — M62.838 MUSCLE SPASM: Primary | ICD-10-CM

## 2024-06-21 RX ORDER — METHOCARBAMOL 500 MG/1
500 TABLET, FILM COATED ORAL 4 TIMES DAILY
Qty: 120 TABLET | Refills: 0 | Status: SHIPPED | OUTPATIENT
Start: 2024-06-21 | End: 2024-07-21

## 2024-06-21 NOTE — TELEPHONE ENCOUNTER
Patient is suffering from muscle tension and spasms from this seizure activity. Mom requesting a muscle relaxer to help.

## 2024-06-22 LAB — GAD65 AB SER IA-ACNC: <5 U/ML (ref 0–5)

## 2024-06-28 ENCOUNTER — TELEPHONE (OUTPATIENT)
Dept: NEUROLOGY | Age: 24
End: 2024-06-28

## 2024-06-28 NOTE — TELEPHONE ENCOUNTER
Called patients mom had to leave a detailed vm, wanting to offer them a sooner New patient appt for Monday Jule 1 st. Asked mom to please return my call @ [phone] als that I was going noah here for about 30 minutes more today Friday June 28 th .

## 2024-06-28 NOTE — TELEPHONE ENCOUNTER
Mom returned my call to let me know that they could make the appt for Monday July 1St @ 2 pm mom also voiced understanding of office location

## 2024-07-01 ENCOUNTER — OFFICE VISIT (OUTPATIENT)
Dept: NEUROLOGY | Age: 24
End: 2024-07-01
Payer: COMMERCIAL

## 2024-07-01 VITALS
OXYGEN SATURATION: 97 % | HEIGHT: 64 IN | WEIGHT: 222 LBS | SYSTOLIC BLOOD PRESSURE: 118 MMHG | BODY MASS INDEX: 37.9 KG/M2 | DIASTOLIC BLOOD PRESSURE: 68 MMHG | HEART RATE: 78 BPM

## 2024-07-01 DIAGNOSIS — R56.9 CONVULSIONS, UNSPECIFIED CONVULSION TYPE (HCC): Primary | ICD-10-CM

## 2024-07-01 DIAGNOSIS — R27.0 ATAXIA: ICD-10-CM

## 2024-07-01 PROCEDURE — 99204 OFFICE O/P NEW MOD 45 MIN: CPT | Performed by: PSYCHIATRY & NEUROLOGY

## 2024-07-01 RX ORDER — METHOCARBAMOL 500 MG/1
500 TABLET, FILM COATED ORAL 4 TIMES DAILY
COMMUNITY
Start: 2024-06-21

## 2024-07-01 NOTE — PROGRESS NOTES
[facility] Office Note      Patient:   Autumn Burt  MR#:    [id number]  Account Number:                         YOB: 2000  Date of Evaluation:  7/1/2024  Time of Note:                          2:04 PM  Primary/Referring Physician:  Martine Olson MD   Consulting Physician:  Chance Stafford DO    NEW PATIENT CONSULTATION    Chief Complaint   Patient presents with    New Patient    Seizures     Last seizure was 7-1-2024   Mom has videos of episodes     Other     Wanting to figure out what is causing her episodes     Difficulty Walking    Aphasia     Stuttering, also having difficult with memory          HISTORY OF PRESENT ILLNESS    Autumn Burt is a 24 y.o. year old female here for possible seizures, ataxia, speech difficulty.  Symptoms started a few months ago.  Patient had a seizure like episode, patient reports a DAMIEN, eyes rolled back, unresponsive briefly, noted some facial numbness, extremity numbness, possible mild GTC activity noted, confusion after, speech difficulty after.   Also noting more balance difficulty, headaches, and intermittent speech difficulty.  No history of TBI, meningitis or encephalitis.  Having daily events.  No family history of seizures.  Prior MRI brain with nothing acute, pineal cyst noted, unchanged.  Some anxiety and depression noted but has not overtly worsened.        Past Medical History:   Diagnosis Date    Anxiety     Asthma     Gastritis        Past Surgical History:   Procedure Laterality Date    CHOLECYSTECTOMY  02/22/2017    UPPER GASTROINTESTINAL ENDOSCOPY  2017    [facility] Med- normal per pt's mom    UPPER GASTROINTESTINAL ENDOSCOPY N/A 12/13/2023    Dr Eid, (-) [place], no lesions to explains symptoms,       Family History   Problem Relation Age of Onset    Rheum Arthritis Mother     Depression Mother     Colon Cancer Maternal Grandmother 69    Dementia Maternal Grandmother     Heart Disease Maternal Grandmother

## 2024-07-02 ENCOUNTER — TELEPHONE (OUTPATIENT)
Dept: NEUROLOGY | Age: 24
End: 2024-07-02

## 2024-07-02 DIAGNOSIS — T78.40XD ALLERGY, SUBSEQUENT ENCOUNTER: ICD-10-CM

## 2024-07-02 RX ORDER — MONTELUKAST SODIUM 10 MG/1
10 TABLET ORAL DAILY
Qty: 90 TABLET | Refills: 1 | Status: SHIPPED | OUTPATIENT
Start: 2024-07-02

## 2024-07-02 NOTE — TELEPHONE ENCOUNTER
Patient's mother, Gisell, called and said patient got up this morning and is having problems using her legs. Patient is unable to walk on her own. Please call Gisell back @ [phone].

## 2024-07-02 NOTE — TELEPHONE ENCOUNTER
"The Emg NCV request you put in for this pt, came back saying, \"please place new orders. We can only do 2 extremities at a time. We need this to broken down into 2 orders.\"   The original order was closed.  "

## 2024-07-03 DIAGNOSIS — M62.838 MUSCLE SPASM: Primary | ICD-10-CM

## 2024-07-03 DIAGNOSIS — R26.9 ABNORMAL GAIT: ICD-10-CM

## 2024-07-03 DIAGNOSIS — R56.9 SEIZURE-LIKE ACTIVITY: ICD-10-CM

## 2024-07-03 NOTE — TELEPHONE ENCOUNTER
Patient called this morning stating that her legs have gotten worse and that her vision is now blurred as well. Patient to get in sooner per Dr. Stafford, patient was informed this and has been added to the cancellation list and [facility] will call them if there is availability to get them in    They were advised that they could go to the ED if needed

## 2024-07-09 ENCOUNTER — HOSPITAL ENCOUNTER (INPATIENT)
Dept: NEUROLOGY | Age: 24
LOS: 1 days | Discharge: HOME | DRG: 101 | End: 2024-07-10
Attending: PSYCHIATRY & NEUROLOGY | Admitting: PSYCHIATRY & NEUROLOGY
Payer: COMMERCIAL

## 2024-07-09 PROBLEM — R56.9 SEIZURE (HCC): Status: ACTIVE | Noted: 2024-07-09

## 2024-07-09 PROCEDURE — 2580000003 HC RX 258: Performed by: PSYCHIATRY & NEUROLOGY

## 2024-07-09 PROCEDURE — 6370000000 HC RX 637 (ALT 250 FOR IP): Performed by: PSYCHIATRY & NEUROLOGY

## 2024-07-09 PROCEDURE — 95720 EEG PHY/QHP EA INCR W/VEEG: CPT | Performed by: PSYCHIATRY & NEUROLOGY

## 2024-07-09 PROCEDURE — 1200000000 HC SEMI PRIVATE

## 2024-07-09 PROCEDURE — 99222 1ST HOSP IP/OBS MODERATE 55: CPT | Performed by: PSYCHIATRY & NEUROLOGY

## 2024-07-09 PROCEDURE — 95714 VEEG EA 12-26 HR UNMNTR: CPT

## 2024-07-09 PROCEDURE — 6360000002 HC RX W HCPCS: Performed by: PSYCHIATRY & NEUROLOGY

## 2024-07-09 RX ORDER — ONDANSETRON 2 MG/ML
4 INJECTION INTRAMUSCULAR; INTRAVENOUS EVERY 6 HOURS PRN
Status: DISCONTINUED | OUTPATIENT
Start: 2024-07-09 | End: 2024-07-10 | Stop reason: HOSPADM

## 2024-07-09 RX ORDER — MONTELUKAST SODIUM 10 MG/1
10 TABLET ORAL DAILY
Status: DISCONTINUED | OUTPATIENT
Start: 2024-07-09 | End: 2024-07-10 | Stop reason: HOSPADM

## 2024-07-09 RX ORDER — SERTRALINE HYDROCHLORIDE 150 MG/1
1 CAPSULE ORAL NIGHTLY
Status: DISCONTINUED | OUTPATIENT
Start: 2024-07-09 | End: 2024-07-09

## 2024-07-09 RX ORDER — LORAZEPAM 2 MG/ML
1 INJECTION INTRAMUSCULAR PRN
Status: DISCONTINUED | OUTPATIENT
Start: 2024-07-09 | End: 2024-07-10 | Stop reason: HOSPADM

## 2024-07-09 RX ORDER — METHOCARBAMOL 500 MG/1
500 TABLET, FILM COATED ORAL 4 TIMES DAILY PRN
Status: DISCONTINUED | OUTPATIENT
Start: 2024-07-09 | End: 2024-07-10 | Stop reason: HOSPADM

## 2024-07-09 RX ORDER — ALBUTEROL SULFATE 90 UG/1
2 AEROSOL, METERED RESPIRATORY (INHALATION) EVERY 6 HOURS PRN
Status: DISCONTINUED | OUTPATIENT
Start: 2024-07-09 | End: 2024-07-10 | Stop reason: HOSPADM

## 2024-07-09 RX ORDER — ONDANSETRON 4 MG/1
4 TABLET, ORALLY DISINTEGRATING ORAL EVERY 8 HOURS PRN
Status: DISCONTINUED | OUTPATIENT
Start: 2024-07-09 | End: 2024-07-10 | Stop reason: HOSPADM

## 2024-07-09 RX ORDER — SERTRALINE HYDROCHLORIDE 150 MG/1
1 CAPSULE ORAL NIGHTLY
COMMUNITY

## 2024-07-09 RX ORDER — SODIUM CHLORIDE 0.9 % (FLUSH) 0.9 %
5-40 SYRINGE (ML) INJECTION PRN
Status: DISCONTINUED | OUTPATIENT
Start: 2024-07-09 | End: 2024-07-10 | Stop reason: HOSPADM

## 2024-07-09 RX ORDER — SODIUM CHLORIDE 0.9 % (FLUSH) 0.9 %
5-40 SYRINGE (ML) INJECTION EVERY 12 HOURS SCHEDULED
Status: DISCONTINUED | OUTPATIENT
Start: 2024-07-09 | End: 2024-07-10 | Stop reason: HOSPADM

## 2024-07-09 RX ORDER — POLYETHYLENE GLYCOL 3350 17 G/17G
17 POWDER, FOR SOLUTION ORAL DAILY PRN
Status: DISCONTINUED | OUTPATIENT
Start: 2024-07-09 | End: 2024-07-10 | Stop reason: HOSPADM

## 2024-07-09 RX ORDER — PANTOPRAZOLE SODIUM 40 MG/1
40 TABLET, DELAYED RELEASE ORAL
Status: DISCONTINUED | OUTPATIENT
Start: 2024-07-10 | End: 2024-07-10 | Stop reason: HOSPADM

## 2024-07-09 RX ORDER — SODIUM CHLORIDE 9 MG/ML
INJECTION, SOLUTION INTRAVENOUS PRN
Status: DISCONTINUED | OUTPATIENT
Start: 2024-07-09 | End: 2024-07-10 | Stop reason: HOSPADM

## 2024-07-09 RX ORDER — ENOXAPARIN SODIUM 100 MG/ML
30 INJECTION SUBCUTANEOUS 2 TIMES DAILY
Status: DISCONTINUED | OUTPATIENT
Start: 2024-07-09 | End: 2024-07-10 | Stop reason: HOSPADM

## 2024-07-09 RX ORDER — METHOCARBAMOL 500 MG/1
500 TABLET, FILM COATED ORAL 4 TIMES DAILY
Status: DISCONTINUED | OUTPATIENT
Start: 2024-07-09 | End: 2024-07-09

## 2024-07-09 RX ADMIN — SODIUM CHLORIDE, PRESERVATIVE FREE 10 ML: 5 INJECTION INTRAVENOUS at 21:19

## 2024-07-09 RX ADMIN — ENOXAPARIN SODIUM 30 MG: 100 INJECTION SUBCUTANEOUS at 19:45

## 2024-07-09 RX ADMIN — SERTRALINE HYDROCHLORIDE 150 MG: 100 TABLET ORAL at 19:44

## 2024-07-09 RX ADMIN — METHOCARBAMOL 500 MG: 500 TABLET ORAL at 18:04

## 2024-07-09 ASSESSMENT — PAIN SCALES - GENERAL: PAINLEVEL_OUTOF10: 3

## 2024-07-09 ASSESSMENT — PAIN DESCRIPTION - LOCATION: LOCATION: GENERALIZED

## 2024-07-09 NOTE — PROGRESS NOTES
4 Eyes Skin Assessment     NAME:  Autumn Burt  YOB: 2000  MEDICAL RECORD NUMBER:  [id number]    The patient is being assessed for  Admission    I agree that at least one RN has performed a thorough Head to Toe Skin Assessment on the patient. ALL assessment sites listed below have been assessed.      Areas assessed by both nurses:    Other pt denies any problems/issues w/ skin        Does the Patient have a Wound? No noted wound(s)       Kye Prevention initiated by RN: No  Wound Care Orders initiated by RN: No    Pressure Injury (Stage 3,4, Unstageable, DTI, NWPT, and Complex wounds) if present, place Wound referral order by RN under : No    New Ostomies, if present place, Ostomy referral order under : No     Nurse 1 eSignature: Electronically signed by Carla Spicer RN on 7/9/24 at 11:29 AM CDT    **SHARE this note so that the co-signing nurse can place an eSignature**    Nurse 2 eSignature: {Esignature:[id number]}

## 2024-07-09 NOTE — PROGRESS NOTES
Autumn Burt arrived to room # 404.   Presented with: convulsions  Mental Status: Patient is oriented, alert, coherent, logical, thought processes intact, and able to concentrate and follow conversation.   Vitals:    07/09/24 0917   BP: 111/75   Pulse: 77   Resp: 20   Temp: 98.2 °F (36.8 °C)   SpO2: 97%     Patient safety contract and falls prevention contract reviewed with patient Yes.  Oriented Patient to room.  Call light within reach. Yes.  Needs, issues or concerns expressed at this time: no.      Electronically signed by Carla Spicer RN on 7/9/2024 at 11:14 AM

## 2024-07-09 NOTE — H&P
HISTORY AND PHYSICAL      Patient:   Autumn Burt  MR#:    [id number]  Account Number:                   [id number]      Room:    [address] [place] [address]-   YOB: 2000  Date of Progress Note: 7/9/2024  Time of Note                           1:05 PM  Attending Physician:  Chance Stafford DO      CHIEF COMPLAINT:  Seizure      HISTORY OF PRESENT ILLNESS:   This 24 y.o. female who presents with possible seizures.  Symptoms started a few months ago.  Patient had a seizure-like episode and reports a loss of awareness in which her eyes rolled back and was unresponsive briefly with some associated facial numbness and possible mild generalized tonic-clonic activity noted.  Confusion was noted after the event as well as speech difficulty.  MRI brain revealed nothing acute.  She has no history of traumatic brain injury, meningitis or encephalitis.  She is having daily events.  Some underlying anxiety and depression is noted.    REVIEW OF SYSTEMS    Constitutional:  Denies fever or chills   Eyes:  Denies change in visual acuity   HENT:  Denies nasal congestion or sore throat   Respiratory:  Denies cough or shortness of breath   Cardiovascular:  Denies chest pain or edema   GI:  Denies abdominal pain, nausea, vomiting, bloody stools or diarrhea   :  Denies dysuria   Musculoskeletal:  Denies back pain or joint pain   Integument:  Denies rash   Neurologic:  Denies headache, focal weakness or sensory changes   Endocrine:  Denies polyuria or polydipsia   Lymphatic:  Denies swollen glands   Psychiatric:  Depression / anxiety, no SI/HI      All other review of systems are negative.    Past Medical History:  Past Medical History:   Diagnosis Date    Anxiety     Asthma     Gastritis        Past Surgical History:      Procedure Laterality Date    CHOLECYSTECTOMY  02/22/2017    UPPER GASTROINTESTINAL ENDOSCOPY  2017    [facility]- normal per pt's mom    UPPER GASTROINTESTINAL ENDOSCOPY N/A 12/13/2023

## 2024-07-10 ENCOUNTER — READMISSION MANAGEMENT (OUTPATIENT)
Dept: CALL CENTER | Facility: HOSPITAL | Age: 24
End: 2024-07-10
Payer: COMMERCIAL

## 2024-07-10 VITALS
BODY MASS INDEX: 40.43 KG/M2 | DIASTOLIC BLOOD PRESSURE: 83 MMHG | TEMPERATURE: 97.5 F | RESPIRATION RATE: 20 BRPM | WEIGHT: 236.8 LBS | OXYGEN SATURATION: 98 % | HEIGHT: 64 IN | HEART RATE: 86 BPM | SYSTOLIC BLOOD PRESSURE: 99 MMHG

## 2024-07-10 PROCEDURE — 94760 N-INVAS EAR/PLS OXIMETRY 1: CPT

## 2024-07-10 PROCEDURE — 99238 HOSP IP/OBS DSCHRG MGMT 30/<: CPT | Performed by: PSYCHIATRY & NEUROLOGY

## 2024-07-10 PROCEDURE — 6370000000 HC RX 637 (ALT 250 FOR IP): Performed by: PSYCHIATRY & NEUROLOGY

## 2024-07-10 PROCEDURE — 95714 VEEG EA 12-26 HR UNMNTR: CPT

## 2024-07-10 PROCEDURE — 95720 EEG PHY/QHP EA INCR W/VEEG: CPT | Performed by: PSYCHIATRY & NEUROLOGY

## 2024-07-10 PROCEDURE — 4A10X4Z MONITORING OF CENTRAL NERVOUS ELECTRICAL ACTIVITY, EXTERNAL APPROACH: ICD-10-PCS | Performed by: PSYCHIATRY & NEUROLOGY

## 2024-07-10 PROCEDURE — 2580000003 HC RX 258: Performed by: PSYCHIATRY & NEUROLOGY

## 2024-07-10 PROCEDURE — 6360000002 HC RX W HCPCS: Performed by: PSYCHIATRY & NEUROLOGY

## 2024-07-10 RX ORDER — LAMOTRIGINE 25 MG/1
25 TABLET ORAL DAILY
Qty: 30 TABLET | Refills: 1 | Status: SHIPPED | OUTPATIENT
Start: 2024-07-10

## 2024-07-10 RX ADMIN — ONDANSETRON 4 MG: 2 INJECTION INTRAMUSCULAR; INTRAVENOUS at 02:51

## 2024-07-10 RX ADMIN — ENOXAPARIN SODIUM 30 MG: 100 INJECTION SUBCUTANEOUS at 08:33

## 2024-07-10 RX ADMIN — MONTELUKAST 10 MG: 10 TABLET, FILM COATED ORAL at 08:33

## 2024-07-10 RX ADMIN — SODIUM CHLORIDE, PRESERVATIVE FREE 10 ML: 5 INJECTION INTRAVENOUS at 08:33

## 2024-07-10 RX ADMIN — PANTOPRAZOLE SODIUM 40 MG: 40 TABLET, DELAYED RELEASE ORAL at 08:33

## 2024-07-10 NOTE — OUTREACH NOTE
Prep Survey      Flowsheet Row Responses   Gnosticism facility patient discharged from? Non-BH   Is LACE score < 7 ? Non-BH Discharge   Eligibility [facility]   Date of Admission 07/09/24   Date of Discharge 07/10/24   Discharge Disposition Home or Self Care   Discharge diagnosis seizure   Does the patient have one of the following disease processes/diagnoses(primary or secondary)? Other   Does the patient have Home health ordered? No   Prep survey completed? Yes            Brenna DE LEON - Registered Nurse

## 2024-07-10 NOTE — PROCEDURES
DAILY ADULT INPATIENT VIDEO-EEG EVENT MONITORING REPORT    Patient:   Autumn Burt  MR#:    [id number]  Room #:    INPATIENT   YOB: 2000  Study Date:    July 10, 2024  Primary Physician:     Martine Olson MD   Referring Physician:   Chance Stafford DO      CLINICAL INFORMATION:     This patient is a 24 y.o. female with a history of possible seizures.  The specific reason we are doing this study is for event clarification and to evaluate for an underlying seizure focus.     MEDICATIONS:     See MAR    RECORDING CONDITIONS:     Continuous video-EEG monitoring was performed with [employer] equiptment. The recorded period occurred on July 10, 2024. Electrodes were applied according to the International 10-20 System. Data were obtained, stored, and interpreted according to ACNS guidelines (J Clin Neurophysiol 2006;23(2):) utilizing referential montage recording, with reformatting to longitudinal, transverse bipolar, and referential montages as necessary for interpretation, along with digital/automated EEG analysis. Physician access and review of the EEG and Video data occurred actively during the recording.      RECORDING DURATION:   12 hours, 50 min.       DESCRIPTION OF BASELINE RECORD:    During the waking state, the predominant posterior resting frequency was rhythmic, symmetric, 9-10 Hz, 30-40 uV rhythm with reactivity to eye opening and closure demonstrated. Drowsiness was demonstrated by slow rolling eye movements followed by loss of background waking activities. Onset of Stage I sleep was demonstrated by gradual disappearance of background waking rhythms and the onset of gradual symmetric mixed-frequency 4-7 Hz slowing. Stage II sleep was characterized by vertex transients, sleep-spindles, and K-complexes, at times with shifting asymmetry demonstrated. Stage III and IV of sleep were characterized by progressive proportion of high voltage slowing in the delta frequency. Muscle, motion,

## 2024-07-10 NOTE — DISCHARGE SUMMARY
EPILEPSY MONITORING UNIT DISCHARGE SUMMARY      Patient Name: Autumn Burt    :  2000    MRN:  [id number]  Admit date:  2024    Discharge date:  7/10/2024  Admitting Physician:  Chance Stafford DO    Primary Care Physician:  Martine Olson MD    Discharge Diagnoses:  Convulsions    Diagnostic Studies: 24 hour video EEG monitoring    Hospital Course: The patient was admitted to our epilepsy monitoring unit.  Continuous video EEG monitoring was obtained.  Seizure precautions were put in place.  Multiple events were captured which consisted clinically of the patient appearing unresponsive with generalized shaking.  No overt epileptiform abnormalities were noted.  The events were nonepileptic.  The patient be discharged home today.  She will follow-up with me in 1 to 2 weeks.  We will start Lamictal 25 mg daily and titrate heart follow-up.  This will be used more for mood stabilization then epilepsy.  The patient will continue to follow event precautions as outlined below.  Will arrange psychiatry follow-up if needed as an outpatient.  She will continue in outpatient counseling.    Discharge Medications:        Medication List        START taking these medications      lamoTRIgine 25 MG tablet  Commonly known as: LaMICtal  Take 1 tablet by mouth daily            CONTINUE taking these medications      albuterol sulfate  (90 Base) MCG/ACT inhaler  Commonly known as: PROVENTIL;VENTOLIN;PROAIR  Inhale 2 puffs into the lungs every 6 hours as needed for Wheezing     EPINEPHrine 0.3 MG/0.3ML Soaj injection  Commonly known as: EPIPEN     methocarbamol 500 MG tablet  Commonly known as: ROBAXIN     montelukast 10 MG tablet  Commonly known as: SINGULAIR  Take 1 tablet by mouth daily     pantoprazole 40 MG tablet  Commonly known as: Protonix  Take 1 tablet by mouth every morning (before breakfast)     Sertraline HCl 150 MG Caps     Ubrelvy 100 MG Tabs  Generic drug: Ubrogepant               Where to Get

## 2024-07-10 NOTE — PROCEDURES
DAILY ADULT INPATIENT VIDEO-EEG EVENT MONITORING REPORT    Patient:   Autumn Burt  MR#:    [id number]  Room #:    INPATIENT   YOB: 2000  Study Date:    July 9, 2024  Primary Physician:     Martine Olson MD   Referring Physician:   Chance Stafford DO      CLINICAL INFORMATION:     This patient is a 24 y.o. female with a history of possible seizures.  The specific reason we are doing this study is for event clarification and to evaluate for an underlying seizure focus.     MEDICATIONS:     See MAR    RECORDING CONDITIONS:     Continuous video-EEG monitoring was performed with [employer] equiptment. The recorded period occurred on July 9, 2024. Electrodes were applied according to the International 10-20 System. Data were obtained, stored, and interpreted according to ACNS guidelines (J Clin Neurophysiol 2006;23(2):) utilizing referential montage recording, with reformatting to longitudinal, transverse bipolar, and referential montages as necessary for interpretation, along with digital/automated EEG analysis. Physician access and review of the EEG and Video data occurred actively during the recording.      RECORDING DURATION:   14 hours, 3 min.       DESCRIPTION OF BASELINE RECORD:    During the waking state, the predominant posterior resting frequency was rhythmic, symmetric, 9-10 Hz, 30-40 uV rhythm with reactivity to eye opening and closure demonstrated. Drowsiness was demonstrated by slow rolling eye movements followed by loss of background waking activities. Onset of Stage I sleep was demonstrated by gradual disappearance of background waking rhythms and the onset of gradual symmetric mixed-frequency 4-7 Hz slowing. Stage II sleep was characterized by vertex transients, sleep-spindles, and K-complexes, at times with shifting asymmetry demonstrated. Stage III and IV of sleep were characterized by progressive proportion of high voltage slowing in the delta frequency. Muscle, motion, and

## 2024-07-10 NOTE — PROGRESS NOTES
Called to pt room per pt mother. Entered room and pt was shaking violently with eyes rolled back and not responding verbally. Seizure lasted approx. 1min and 40 secs starting at 0200. PCA and nurses at bedside.

## 2024-07-10 NOTE — DISCHARGE INSTRUCTIONS
Epilepsy precautions and seizure first aid discussed.  No driving, heights, swimming, tub baths, open flames, or heavy machinery.

## 2024-07-10 NOTE — PROGRESS NOTES
Called to pt room per pt mother. Entered the room and pt was shaking violently, eyes were shaking, not responding verbally. Pt had an emesis episode coming out of seizure. Seizure lasted approx. 1 minute and 25 seconds starting at 0245. Nurses at bedside

## 2024-07-11 ENCOUNTER — TRANSITIONAL CARE MANAGEMENT TELEPHONE ENCOUNTER (OUTPATIENT)
Dept: CALL CENTER | Facility: HOSPITAL | Age: 24
End: 2024-07-11
Payer: COMMERCIAL

## 2024-07-11 ENCOUNTER — TELEPHONE (OUTPATIENT)
Dept: NEUROLOGY | Age: 24
End: 2024-07-11

## 2024-07-11 NOTE — OUTREACH NOTE
Call Center TCM Note      Flowsheet Row Responses   [facility] patient discharged from? Non-BH   Does the patient have one of the following disease processes/diagnoses(primary or secondary)? Other   TCM attempt successful? No   Unsuccessful attempts Attempt 2            Izzy Da Silva LPN    7/11/2024, 15:19 CDT

## 2024-07-11 NOTE — TELEPHONE ENCOUNTER
Called patient back to give her an  appt for her 2 week follow up for 8- will work on getting her in sooner since Dr Stafford started her on Lamictical  25 mg daily and stated that we would discuss the titration at her follow up in 2 weeks.   Patient voiced understanding.

## 2024-07-11 NOTE — TELEPHONE ENCOUNTER
Patient called to schedule a 2 week follow up after 24 EEG monitoring. Psychiatric was unable to accommodate. Please return her call.    Thank you!

## 2024-07-11 NOTE — OUTREACH NOTE
Call Center TCM Note      Flowsheet Row Responses   [facility], [place] patient discharged from? Non-BH   Does the patient have one of the following disease processes/diagnoses(primary or secondary)? Other   TCM attempt successful? No   Unsuccessful attempts Attempt 1            Izzy Da Silva LPN    7/11/2024, 14:07 CDT

## 2024-07-12 ENCOUNTER — HOSPITAL ENCOUNTER (OUTPATIENT)
Dept: CARDIOLOGY | Facility: HOSPITAL | Age: 24
Discharge: HOME | End: 2024-07-12
Payer: COMMERCIAL

## 2024-07-12 ENCOUNTER — TRANSITIONAL CARE MANAGEMENT TELEPHONE ENCOUNTER (OUTPATIENT)
Dept: CALL CENTER | Facility: HOSPITAL | Age: 24
End: 2024-07-12
Payer: COMMERCIAL

## 2024-07-12 VITALS
SYSTOLIC BLOOD PRESSURE: 128 MMHG | BODY MASS INDEX: 38.93 KG/M2 | DIASTOLIC BLOOD PRESSURE: 79 MMHG | WEIGHT: 228 LBS | HEIGHT: 64 IN

## 2024-07-12 DIAGNOSIS — R55 SYNCOPE, UNSPECIFIED SYNCOPE TYPE: ICD-10-CM

## 2024-07-12 DIAGNOSIS — R56.9 SEIZURE-LIKE ACTIVITY: ICD-10-CM

## 2024-07-12 PROCEDURE — 93306 TTE W/DOPPLER COMPLETE: CPT

## 2024-07-12 PROCEDURE — 25510000001 PERFLUTREN (DEFINITY) 8.476 MG IN SODIUM CHLORIDE (PF) 0.9 % 10 ML INJECTION

## 2024-07-12 RX ADMIN — PERFLUTREN 10 ML: 6.52 INJECTION, SUSPENSION INTRAVENOUS at 15:31

## 2024-07-12 NOTE — OUTREACH NOTE
Call Center TCM Note      Flowsheet Row Responses   [facility] patient discharged from? Non-  [[facility]]   Does the patient have one of the following disease processes/diagnoses(primary or secondary)? Other   TCM attempt successful? No   Unsuccessful attempts Attempt 3            Griselda Jenkins RN    7/12/2024, 14:20 CDT

## 2024-07-14 LAB
BH CV ECHO MEAS - AO MAX PG: 6 MMHG
BH CV ECHO MEAS - AO MEAN PG: 3.3 MMHG
BH CV ECHO MEAS - AO ROOT DIAM: 2.9 CM
BH CV ECHO MEAS - AO V2 MAX: 122.8 CM/SEC
BH CV ECHO MEAS - AO V2 VTI: 23.5 CM
BH CV ECHO MEAS - AVA(I,D): 3.4 CM2
BH CV ECHO MEAS - EDV(CUBED): 133.8 ML
BH CV ECHO MEAS - EDV(MOD-SP2): 69.6 ML
BH CV ECHO MEAS - EDV(MOD-SP4): 76.9 ML
BH CV ECHO MEAS - EF(MOD-SP2): 64.9 %
BH CV ECHO MEAS - EF(MOD-SP4): 58.8 %
BH CV ECHO MEAS - ESV(CUBED): 34.8 ML
BH CV ECHO MEAS - ESV(MOD-SP2): 24.4 ML
BH CV ECHO MEAS - ESV(MOD-SP4): 31.7 ML
BH CV ECHO MEAS - FS: 36.2 %
BH CV ECHO MEAS - IVS/LVPW: 1 CM
BH CV ECHO MEAS - IVSD: 0.8 CM
BH CV ECHO MEAS - LA DIMENSION: 4.1 CM
BH CV ECHO MEAS - LAT PEAK E' VEL: 16 CM/SEC
BH CV ECHO MEAS - LV DIASTOLIC VOL/BSA (35-75): 37.2 CM2
BH CV ECHO MEAS - LV MASS(C)D: 141.2 GRAMS
BH CV ECHO MEAS - LV MAX PG: 3.8 MMHG
BH CV ECHO MEAS - LV MEAN PG: 2.3 MMHG
BH CV ECHO MEAS - LV SYSTOLIC VOL/BSA (12-30): 15.3 CM2
BH CV ECHO MEAS - LV V1 MAX: 97.2 CM/SEC
BH CV ECHO MEAS - LV V1 VTI: 19.7 CM
BH CV ECHO MEAS - LVIDD: 5.1 CM
BH CV ECHO MEAS - LVIDS: 3.3 CM
BH CV ECHO MEAS - LVOT AREA: 4 CM2
BH CV ECHO MEAS - LVOT DIAM: 2.26 CM
BH CV ECHO MEAS - LVPWD: 0.8 CM
BH CV ECHO MEAS - MED PEAK E' VEL: 10 CM/SEC
BH CV ECHO MEAS - MV A MAX VEL: 62 CM/SEC
BH CV ECHO MEAS - MV DEC SLOPE: 409 CM/SEC2
BH CV ECHO MEAS - MV DEC TIME: 0.19 SEC
BH CV ECHO MEAS - MV E MAX VEL: 72.6 CM/SEC
BH CV ECHO MEAS - MV E/A: 1.17
BH CV ECHO MEAS - MV MAX PG: 3.2 MMHG
BH CV ECHO MEAS - MV MEAN PG: 1.62 MMHG
BH CV ECHO MEAS - MV V2 VTI: 24.8 CM
BH CV ECHO MEAS - MVA(VTI): 3.2 CM2
BH CV ECHO MEAS - PA V2 MAX: 100.7 CM/SEC
BH CV ECHO MEAS - RV MAX PG: 2.8 MMHG
BH CV ECHO MEAS - RV V1 MAX: 83.9 CM/SEC
BH CV ECHO MEAS - RV V1 VTI: 17.1 CM
BH CV ECHO MEAS - SV(LVOT): 79.4 ML
BH CV ECHO MEAS - SV(MOD-SP2): 45.2 ML
BH CV ECHO MEAS - SV(MOD-SP4): 45.2 ML
BH CV ECHO MEAS - SVI(LVOT): 38.4 ML/M2
BH CV ECHO MEAS - SVI(MOD-SP2): 21.9 ML/M2
BH CV ECHO MEAS - SVI(MOD-SP4): 21.9 ML/M2
BH CV ECHO MEAS - TAPSE (>1.6): 23 CM
BH CV ECHO MEAS - TR MAX PG: 3.3 MMHG
BH CV ECHO MEAS - TR MAX VEL: 91 CM/SEC
BH CV ECHO MEASUREMENTS AVERAGE E/E' RATIO: 5.58
BH CV XLRA - TDI S': 15 CM/SEC
LEFT ATRIUM VOLUME INDEX: 23 ML/M2
LEFT ATRIUM VOLUME: 47 ML

## 2024-07-30 ENCOUNTER — PATIENT MESSAGE (OUTPATIENT)
Dept: FAMILY MEDICINE CLINIC | Facility: CLINIC | Age: 24
End: 2024-07-30
Payer: COMMERCIAL

## 2024-07-30 ENCOUNTER — OFFICE VISIT (OUTPATIENT)
Dept: NEUROLOGY | Age: 24
End: 2024-07-30
Payer: COMMERCIAL

## 2024-07-30 ENCOUNTER — TELEPHONE (OUTPATIENT)
Dept: FAMILY MEDICINE CLINIC | Facility: CLINIC | Age: 24
End: 2024-07-30
Payer: COMMERCIAL

## 2024-07-30 VITALS
HEART RATE: 88 BPM | BODY MASS INDEX: 40.29 KG/M2 | OXYGEN SATURATION: 99 % | SYSTOLIC BLOOD PRESSURE: 118 MMHG | HEIGHT: 64 IN | DIASTOLIC BLOOD PRESSURE: 70 MMHG | WEIGHT: 236 LBS

## 2024-07-30 DIAGNOSIS — R27.0 ATAXIA: ICD-10-CM

## 2024-07-30 DIAGNOSIS — M54.2 NECK PAIN: ICD-10-CM

## 2024-07-30 DIAGNOSIS — R56.9 CONVULSIONS, UNSPECIFIED CONVULSION TYPE (HCC): Primary | ICD-10-CM

## 2024-07-30 PROCEDURE — 99214 OFFICE O/P EST MOD 30 MIN: CPT | Performed by: PSYCHIATRY & NEUROLOGY

## 2024-07-30 RX ORDER — LAMOTRIGINE 25 MG/1
100 TABLET ORAL 2 TIMES DAILY
Qty: 240 TABLET | Refills: 5 | Status: SHIPPED | OUTPATIENT
Start: 2024-07-30

## 2024-07-30 NOTE — PROGRESS NOTES
[facility] Office Note      Patient:   Autumn Burt  MR#:    [id number]  Account Number:                         YOB: 2000  Date of Evaluation:  7/30/2024  Time of Note:                          11:25 AM  Primary/Referring Physician:  Martine Olson MD   Consulting Physician:  Chance Stafford,     FOLLOW UP VISIT     Chief Complaint   Patient presents with    Follow-up    Seizures     Last seizure was 7-    Results     Results from recent LTME    Difficulty Walking     Also tremors in feet and some in her hands        HISTORY OF PRESENT ILLNESS    Autumn Burt is a 24 y.o. year old female here for possible seizures, ataxia, speech difficulty.  Symptoms started a few months ago.  Patient had a seizure like episode, patient reports a DAMIEN, eyes rolled back, unresponsive briefly, noted some facial numbness, extremity numbness, possible mild GTC activity noted, confusion after, speech difficulty after.   Also noting more balance difficulty, headaches, and intermittent speech difficulty.  No history of TBI, meningitis or encephalitis.  Having daily events.  No family history of seizures.  Prior MRI brain with nothing acute, pineal cyst noted, unchanged.  Some anxiety and depression noted but has not overtly worsened.  Recent video EEG consistent with non-epileptic events, started on Lamictal for mood stabilization.       Past Medical History:   Diagnosis Date    Anxiety     Asthma     Gastritis        Past Surgical History:   Procedure Laterality Date    CHOLECYSTECTOMY  02/22/2017    UPPER GASTROINTESTINAL ENDOSCOPY  2017    [facility] [place] [facility]- normal per pt's mom    UPPER GASTROINTESTINAL ENDOSCOPY N/A 12/13/2023    Dr Eid, (-) Sprue, no lesions to explains symptoms,       Family History   Problem Relation Age of Onset    Rheum Arthritis Mother     Depression Mother     Colon Cancer Maternal Grandmother 69    Dementia Maternal Grandmother     Heart Disease Maternal

## 2024-08-20 PROBLEM — G89.29 CHRONIC NONINTRACTABLE HEADACHE: Status: ACTIVE | Noted: 2024-08-20

## 2024-08-20 PROBLEM — R51.9 CHRONIC NONINTRACTABLE HEADACHE: Status: ACTIVE | Noted: 2024-08-20

## 2024-08-22 ENCOUNTER — PATIENT MESSAGE (OUTPATIENT)
Dept: NEUROLOGY | Age: 24
End: 2024-08-22

## 2024-08-22 ENCOUNTER — PATIENT MESSAGE (OUTPATIENT)
Dept: FAMILY MEDICINE CLINIC | Facility: CLINIC | Age: 24
End: 2024-08-22
Payer: COMMERCIAL

## 2024-08-26 ENCOUNTER — HOSPITAL ENCOUNTER (OUTPATIENT)
Dept: MRI IMAGING | Age: 24
Discharge: HOME | End: 2024-08-26
Payer: COMMERCIAL

## 2024-08-26 DIAGNOSIS — M54.2 NECK PAIN: ICD-10-CM

## 2024-08-26 PROCEDURE — 72141 MRI NECK SPINE W/O DYE: CPT

## 2024-08-28 ENCOUNTER — LAB (OUTPATIENT)
Dept: LAB | Facility: HOSPITAL | Age: 24
End: 2024-08-28
Payer: COMMERCIAL

## 2024-08-28 ENCOUNTER — OFFICE VISIT (OUTPATIENT)
Dept: FAMILY MEDICINE CLINIC | Facility: CLINIC | Age: 24
End: 2024-08-28
Payer: COMMERCIAL

## 2024-08-28 VITALS
HEIGHT: 64 IN | TEMPERATURE: 99.1 F | OXYGEN SATURATION: 98 % | BODY MASS INDEX: 45.93 KG/M2 | RESPIRATION RATE: 20 BRPM | DIASTOLIC BLOOD PRESSURE: 81 MMHG | WEIGHT: 269 LBS | SYSTOLIC BLOOD PRESSURE: 135 MMHG | HEART RATE: 97 BPM

## 2024-08-28 DIAGNOSIS — J45.41 MODERATE PERSISTENT ASTHMA WITH EXACERBATION: Primary | ICD-10-CM

## 2024-08-28 DIAGNOSIS — B34.8 RHINOVIRUS: ICD-10-CM

## 2024-08-28 DIAGNOSIS — R50.9 FEVER, UNSPECIFIED FEVER CAUSE: ICD-10-CM

## 2024-08-28 LAB
B PARAPERT DNA SPEC QL NAA+PROBE: NOT DETECTED
B PERT DNA SPEC QL NAA+PROBE: NOT DETECTED
C PNEUM DNA NPH QL NAA+NON-PROBE: NOT DETECTED
FLUAV SUBTYP SPEC NAA+PROBE: NOT DETECTED
FLUBV RNA ISLT QL NAA+PROBE: NOT DETECTED
HADV DNA SPEC NAA+PROBE: NOT DETECTED
HCOV 229E RNA SPEC QL NAA+PROBE: NOT DETECTED
HCOV HKU1 RNA SPEC QL NAA+PROBE: NOT DETECTED
HCOV NL63 RNA SPEC QL NAA+PROBE: NOT DETECTED
HCOV OC43 RNA SPEC QL NAA+PROBE: NOT DETECTED
HMPV RNA NPH QL NAA+NON-PROBE: NOT DETECTED
HPIV1 RNA ISLT QL NAA+PROBE: NOT DETECTED
HPIV2 RNA SPEC QL NAA+PROBE: NOT DETECTED
HPIV3 RNA NPH QL NAA+PROBE: NOT DETECTED
HPIV4 P GENE NPH QL NAA+PROBE: NOT DETECTED
M PNEUMO IGG SER IA-ACNC: NOT DETECTED
RHINOVIRUS RNA SPEC NAA+PROBE: DETECTED
RSV RNA NPH QL NAA+NON-PROBE: NOT DETECTED
S PYO AG THROAT QL: NEGATIVE
SARS-COV-2 RNA NPH QL NAA+NON-PROBE: NOT DETECTED

## 2024-08-28 PROCEDURE — 96372 THER/PROPH/DIAG INJ SC/IM: CPT | Performed by: NURSE PRACTITIONER

## 2024-08-28 PROCEDURE — 87880 STREP A ASSAY W/OPTIC: CPT

## 2024-08-28 PROCEDURE — 0202U NFCT DS 22 TRGT SARS-COV-2: CPT

## 2024-08-28 PROCEDURE — 87081 CULTURE SCREEN ONLY: CPT

## 2024-08-28 PROCEDURE — 99214 OFFICE O/P EST MOD 30 MIN: CPT | Performed by: NURSE PRACTITIONER

## 2024-08-28 RX ORDER — METHYLPREDNISOLONE 4 MG
TABLET, DOSE PACK ORAL
Qty: 1 EACH | Refills: 0 | Status: SHIPPED | OUTPATIENT
Start: 2024-08-28

## 2024-08-28 RX ORDER — DOXYCYCLINE 100 MG/1
100 CAPSULE ORAL 2 TIMES DAILY
Qty: 20 CAPSULE | Refills: 0 | Status: SHIPPED | OUTPATIENT
Start: 2024-08-28

## 2024-08-28 RX ORDER — METHOCARBAMOL 500 MG/1
1 TABLET, FILM COATED ORAL 4 TIMES DAILY
COMMUNITY
Start: 2024-06-21

## 2024-08-28 RX ORDER — DEXAMETHASONE SODIUM PHOSPHATE 4 MG/ML
8 INJECTION, SOLUTION INTRA-ARTICULAR; INTRALESIONAL; INTRAMUSCULAR; INTRAVENOUS; SOFT TISSUE ONCE
Status: COMPLETED | OUTPATIENT
Start: 2024-08-28 | End: 2024-08-28

## 2024-08-28 RX ORDER — LAMOTRIGINE 25 MG/1
100 TABLET ORAL
COMMUNITY
Start: 2024-07-30

## 2024-08-28 RX ADMIN — DEXAMETHASONE SODIUM PHOSPHATE 8 MG: 4 INJECTION, SOLUTION INTRA-ARTICULAR; INTRALESIONAL; INTRAMUSCULAR; INTRAVENOUS; SOFT TISSUE at 16:20

## 2024-08-28 NOTE — PROGRESS NOTES
"Chief Complaint  Asthma and Cough    Subjective    History of Present Illness      Patient presents to [facility] for   History of Present Illness  Pt here due to wheezing and cough.  Pt has asthma       Review of Systems    I have reviewed and agree with the HPI information as above.  Yvette Salazar, APRN     Objective   Vital Signs:   /81   Pulse 97   Temp 99.1 °F (37.3 °C)   Resp 20   Ht 162.6 cm (64\")   Wt 122 kg (269 lb)   SpO2 98%   BMI 46.17 kg/m²            Physical Exam  Vitals and nursing note reviewed.   Constitutional:       Appearance: Normal appearance. She is well-developed. She is obese.   HENT:      Head: Normocephalic and atraumatic.      Right Ear: Tympanic membrane, ear canal and external ear normal.      Left Ear: Tympanic membrane, ear canal and external ear normal.      Nose: Nose normal. No septal deviation, nasal tenderness or congestion.      Mouth/Throat:      Lips: Pink. No lesions.      Mouth: Mucous membranes are moist. No oral lesions.      Dentition: Normal dentition.      Pharynx: Oropharynx is clear. No pharyngeal swelling, oropharyngeal exudate or posterior oropharyngeal erythema.   Eyes:      General: Lids are normal. Vision grossly intact. No scleral icterus.        Right eye: No discharge.         Left eye: No discharge.      Extraocular Movements: Extraocular movements intact.      Conjunctiva/sclera: Conjunctivae normal.      Right eye: Right conjunctiva is not injected.      Left eye: Left conjunctiva is not injected.      Pupils: Pupils are equal, round, and reactive to light.   Neck:      Thyroid: No thyroid mass.      Trachea: Trachea normal.   Cardiovascular:      Rate and Rhythm: Normal rate and regular rhythm.      Heart sounds: Normal heart sounds. No murmur heard.     No gallop.   Pulmonary:      Effort: Pulmonary effort is normal.      Breath sounds: Normal breath sounds and air entry. Decreased air movement present. " Wheezing present. No rhonchi or rales.   Musculoskeletal:         General: No tenderness or deformity. Normal range of motion.      Cervical back: Full passive range of motion without pain, normal range of motion and neck supple.      Thoracic back: Normal.      Right lower leg: No edema.      Left lower leg: No edema.   Skin:     General: Skin is warm and dry.      Coloration: Skin is not jaundiced.      Findings: No rash.   Neurological:      Mental Status: She is alert and oriented to person, place, and time.      Sensory: Sensation is intact.      Motor: Motor function is intact.      Coordination: Coordination is intact.      Gait: Gait is intact.      Deep Tendon Reflexes: Reflexes are normal and symmetric.   Psychiatric:         Mood and Affect: Mood and affect normal.         Behavior: Behavior normal.         Judgment: Judgment normal.               Result Review  Data Reviewed:                   Assessment and Plan      Diagnoses and all orders for this visit:    1. Moderate persistent asthma with exacerbation (Primary)  -     dexAMETHasone (DECADRON) injection 8 mg  -     doxycycline (VIBRAMYCIN) 100 MG capsule; Take 1 capsule by mouth 2 (Two) Times a Day.  Dispense: 20 capsule; Refill: 0  -     methylPREDNISolone (MEDROL) 4 MG dose pack; Take as directed on package instructions.  Dispense: 1 each; Refill: 0    2. Fever, unspecified fever cause  -     Rapid Strep A Screen - Swab, Throat; Future  -     Respiratory Panel PCR w/COVID-19(SARS-CoV-2) KAITLYN/JUAN MIGUEL/BRAN/PAD/COR/[facility] In-House, NP Swab in UTM/VTM, 2 HR TAT - Swab, Nasopharynx; Future    3. Rhinovirus      Significant flair of asthma noted. She is doing her inhalers as prescribed. She is also doing nebs as needed. She works at a  and strep has been going around.  Plan  Steroid shot and pack  Doxy  Resp swab  Strep swab    Rhinovirus noted on swab.           Follow Up   Return if symptoms worsen or fail to improve.  Patient was given instructions  and counseling regarding her condition or for health maintenance advice. Please see specific information pulled into the AVS if appropriate.

## 2024-08-28 NOTE — PROGRESS NOTES
After obtaining consent, and per orders of Yvette Salazar, injection of dexAMETHasone (DECADRON) injection 8 mg  given by Emily Head MA. Patient instructed to remain in clinic for 20 minutes afterwards, and to report any adverse reaction to me immediately.     Pt tolerated well

## 2024-08-30 LAB — BACTERIA SPEC AEROBE CULT: NORMAL

## 2024-09-12 DIAGNOSIS — G43.009 MIGRAINE WITHOUT AURA AND RESPONSIVE TO TREATMENT: Primary | ICD-10-CM

## 2024-09-12 RX ORDER — BUTALBITAL, ACETAMINOPHEN AND CAFFEINE 50; 325; 40 MG/1; MG/1; MG/1
1 TABLET ORAL EVERY 4 HOURS PRN
Qty: 30 TABLET | Refills: 0 | Status: SHIPPED | OUTPATIENT
Start: 2024-09-12

## 2024-09-12 NOTE — TELEPHONE ENCOUNTER
Patient called c/o a migraine that will not go away. She has taken her ubrelvy and otc meds with no relief. Will trial fioricet at this time.

## 2024-09-16 ENCOUNTER — PATIENT MESSAGE (OUTPATIENT)
Dept: NEUROLOGY | Age: 24
End: 2024-09-16

## 2024-09-18 ENCOUNTER — HOSPITAL ENCOUNTER (OUTPATIENT)
Dept: NEUROLOGY | Facility: HOSPITAL | Age: 24
Discharge: HOME | End: 2024-09-18
Admitting: NURSE PRACTITIONER
Payer: COMMERCIAL

## 2024-09-18 DIAGNOSIS — M62.838 MUSCLE SPASM: ICD-10-CM

## 2024-09-18 DIAGNOSIS — R56.9 SEIZURE-LIKE ACTIVITY: ICD-10-CM

## 2024-09-18 DIAGNOSIS — R26.9 ABNORMAL GAIT: ICD-10-CM

## 2024-09-18 PROCEDURE — 95886 MUSC TEST DONE W/N TEST COMP: CPT

## 2024-09-18 PROCEDURE — 95912 NRV CNDJ TEST 11-12 STUDIES: CPT

## 2024-09-19 ENCOUNTER — TELEPHONE (OUTPATIENT)
Dept: FAMILY MEDICINE CLINIC | Facility: CLINIC | Age: 24
End: 2024-09-19
Payer: COMMERCIAL

## 2024-10-02 ENCOUNTER — PATIENT MESSAGE (OUTPATIENT)
Dept: NEUROLOGY | Age: 24
End: 2024-10-02

## 2024-10-11 ENCOUNTER — OFFICE VISIT (OUTPATIENT)
Dept: FAMILY MEDICINE CLINIC | Facility: CLINIC | Age: 24
End: 2024-10-11
Payer: COMMERCIAL

## 2024-10-11 ENCOUNTER — HOSPITAL ENCOUNTER (OUTPATIENT)
Dept: GENERAL RADIOLOGY | Facility: HOSPITAL | Age: 24
Discharge: HOME | End: 2024-10-11
Payer: COMMERCIAL

## 2024-10-11 ENCOUNTER — LAB (OUTPATIENT)
Dept: LAB | Facility: HOSPITAL | Age: 24
End: 2024-10-11
Payer: COMMERCIAL

## 2024-10-11 VITALS
WEIGHT: 244 LBS | HEIGHT: 64 IN | BODY MASS INDEX: 41.66 KG/M2 | DIASTOLIC BLOOD PRESSURE: 72 MMHG | RESPIRATION RATE: 20 BRPM | SYSTOLIC BLOOD PRESSURE: 118 MMHG

## 2024-10-11 DIAGNOSIS — H66.90 SUBACUTE OTITIS MEDIA, UNSPECIFIED OTITIS MEDIA TYPE: ICD-10-CM

## 2024-10-11 DIAGNOSIS — E55.9 VITAMIN D DEFICIENCY: ICD-10-CM

## 2024-10-11 DIAGNOSIS — Z00.00 WELLNESS EXAMINATION: ICD-10-CM

## 2024-10-11 DIAGNOSIS — M25.562 CHRONIC PAIN OF LEFT KNEE: Primary | ICD-10-CM

## 2024-10-11 DIAGNOSIS — G89.29 CHRONIC PAIN OF LEFT KNEE: ICD-10-CM

## 2024-10-11 DIAGNOSIS — M25.50 ARTHRALGIA, UNSPECIFIED JOINT: ICD-10-CM

## 2024-10-11 DIAGNOSIS — G89.29 CHRONIC PAIN OF LEFT KNEE: Primary | ICD-10-CM

## 2024-10-11 DIAGNOSIS — Z00.00 WELLNESS EXAMINATION: Primary | ICD-10-CM

## 2024-10-11 DIAGNOSIS — M25.562 CHRONIC PAIN OF LEFT KNEE: ICD-10-CM

## 2024-10-11 LAB
ALBUMIN SERPL-MCNC: 4.4 G/DL (ref 3.5–5)
ALBUMIN/GLOB SERPL: 1.4 G/DL (ref 1.1–2.5)
ALP SERPL-CCNC: 78 U/L (ref 24–120)
ALT SERPL W P-5'-P-CCNC: 33 U/L (ref 0–35)
ANION GAP SERPL CALCULATED.3IONS-SCNC: 13 MMOL/L (ref 4–13)
AST SERPL-CCNC: 25 U/L (ref 7–45)
AUTO MIXED CELLS #: 0.6 10*3/MM3 (ref 0.1–2.6)
AUTO MIXED CELLS %: 12.1 % (ref 0.1–24)
BILIRUB SERPL-MCNC: 0.2 MG/DL (ref 0.1–1)
BILIRUB UR QL STRIP: NEGATIVE
BUN SERPL-MCNC: 15 MG/DL (ref 5–21)
BUN/CREAT SERPL: 19.7
CALCIUM SPEC-SCNC: 9.1 MG/DL (ref 8.6–10.5)
CHLORIDE SERPL-SCNC: 105 MMOL/L (ref 98–110)
CHOLEST SERPL-MCNC: 234 MG/DL (ref 130–200)
CLARITY UR: CLEAR
CO2 SERPL-SCNC: 20 MMOL/L (ref 24–31)
COLOR UR: YELLOW
CREAT SERPL-MCNC: 0.76 MG/DL (ref 0.5–1.4)
EGFRCR SERPLBLD CKD-EPI 2021: 111 ML/MIN/1.73
ERYTHROCYTE [DISTWIDTH] IN BLOOD BY AUTOMATED COUNT: 13.2 % (ref 12.3–15.4)
ERYTHROCYTE [SEDIMENTATION RATE] IN BLOOD: 16 MM/HR (ref 0–20)
GLOBULIN UR ELPH-MCNC: 3.2 GM/DL
GLUCOSE SERPL-MCNC: 95 MG/DL (ref 70–100)
GLUCOSE UR STRIP-MCNC: NEGATIVE MG/DL
HCT VFR BLD AUTO: 42.6 % (ref 34–46.6)
HDLC SERPL-MCNC: 80 MG/DL
HGB BLD-MCNC: 14.1 G/DL (ref 12–15.9)
HGB UR QL STRIP.AUTO: NEGATIVE
KETONES UR QL STRIP: NEGATIVE
LDLC SERPL CALC-MCNC: 139 MG/DL (ref 0–99)
LDLC/HDLC SERPL: 1.71 {RATIO}
LEUKOCYTE ESTERASE UR QL STRIP.AUTO: NEGATIVE
LYMPHOCYTES # BLD AUTO: 1.3 10*3/MM3 (ref 0.7–3.1)
LYMPHOCYTES NFR BLD AUTO: 23.6 % (ref 19.6–45.3)
MCH RBC QN AUTO: 30.8 PG (ref 26.6–33)
MCHC RBC AUTO-ENTMCNC: 33.1 G/DL (ref 31.5–35.7)
MCV RBC AUTO: 93 FL (ref 79–97)
NEUTROPHILS NFR BLD AUTO: 3.4 10*3/MM3 (ref 1.7–7)
NEUTROPHILS NFR BLD AUTO: 64.3 % (ref 42.7–76)
NITRITE UR QL STRIP: NEGATIVE
PH UR STRIP.AUTO: 6 [PH] (ref 5–8)
PLATELET # BLD AUTO: 330 10*3/MM3 (ref 140–450)
PMV BLD AUTO: 7.7 FL (ref 6–12)
POTASSIUM SERPL-SCNC: 3.9 MMOL/L (ref 3.5–5.3)
PROT SERPL-MCNC: 7.6 G/DL (ref 6.3–8.7)
PROT UR QL STRIP: NEGATIVE
RBC # BLD AUTO: 4.58 10*6/MM3 (ref 3.77–5.28)
SODIUM SERPL-SCNC: 138 MMOL/L (ref 135–145)
SP GR UR STRIP: >=1.03 (ref 1–1.03)
TRIGL SERPL-MCNC: 85 MG/DL (ref 0–149)
UROBILINOGEN UR QL STRIP: NORMAL
VLDLC SERPL-MCNC: 15 MG/DL (ref 5–40)
WBC NRBC COR # BLD AUTO: 5.3 10*3/MM3 (ref 3.4–10.8)

## 2024-10-11 PROCEDURE — 82306 VITAMIN D 25 HYDROXY: CPT

## 2024-10-11 PROCEDURE — 84550 ASSAY OF BLOOD/URIC ACID: CPT

## 2024-10-11 PROCEDURE — 36415 COLL VENOUS BLD VENIPUNCTURE: CPT

## 2024-10-11 PROCEDURE — 80061 LIPID PANEL: CPT

## 2024-10-11 PROCEDURE — 86431 RHEUMATOID FACTOR QUANT: CPT

## 2024-10-11 PROCEDURE — 99395 PREV VISIT EST AGE 18-39: CPT | Performed by: NURSE PRACTITIONER

## 2024-10-11 PROCEDURE — 85652 RBC SED RATE AUTOMATED: CPT

## 2024-10-11 PROCEDURE — 80050 GENERAL HEALTH PANEL: CPT

## 2024-10-11 PROCEDURE — 73562 X-RAY EXAM OF KNEE 3: CPT

## 2024-10-11 PROCEDURE — 86140 C-REACTIVE PROTEIN: CPT

## 2024-10-11 PROCEDURE — 99214 OFFICE O/P EST MOD 30 MIN: CPT | Performed by: NURSE PRACTITIONER

## 2024-10-11 PROCEDURE — 86038 ANTINUCLEAR ANTIBODIES: CPT

## 2024-10-11 PROCEDURE — 81003 URINALYSIS AUTO W/O SCOPE: CPT

## 2024-10-11 RX ORDER — CLINDAMYCIN HCL 300 MG
300 CAPSULE ORAL 3 TIMES DAILY
Qty: 20 CAPSULE | Refills: 0 | Status: SHIPPED | OUTPATIENT
Start: 2024-10-11

## 2024-10-11 NOTE — PROGRESS NOTES
"Chief Complaint  Wellness Check and Knee Pain    Subjective    History of Present Illness      Patient presents to [facility] for   History of Present Illness  Patient is being seen today for a wellness exam, labs, and c/o left knee pain.        Objective   Vital Signs:   /72   Resp 20   Ht 162.6 cm (64\")   Wt 111 kg (244 lb)   BMI 41.88 kg/m²            Physical Exam  Vitals and nursing note reviewed.   Constitutional:       Appearance: Normal appearance. She is well-developed. She is obese.   HENT:      Head: Normocephalic and atraumatic.      Right Ear: Ear canal and external ear normal. A PE tube is present. Tympanic membrane is erythematous.      Left Ear: Ear canal and external ear normal. Swelling present. Tympanic membrane is erythematous.      Nose: Nose normal. No septal deviation, nasal tenderness or congestion.      Mouth/Throat:      Lips: Pink. No lesions.      Mouth: Mucous membranes are moist. No oral lesions.      Dentition: Normal dentition.      Pharynx: Oropharynx is clear. No pharyngeal swelling, oropharyngeal exudate or posterior oropharyngeal erythema.   Eyes:      General: Lids are normal. Vision grossly intact. No scleral icterus.        Right eye: No discharge.         Left eye: No discharge.      Extraocular Movements: Extraocular movements intact.      Conjunctiva/sclera: Conjunctivae normal.      Right eye: Right conjunctiva is not injected.      Left eye: Left conjunctiva is not injected.      Pupils: Pupils are equal, round, and reactive to light.   Neck:      Thyroid: No thyroid mass.      Trachea: Trachea normal.   Cardiovascular:      Rate and Rhythm: Normal rate and regular rhythm.      Heart sounds: Normal heart sounds. No murmur heard.     No gallop.   Pulmonary:      Effort: Pulmonary effort is normal.      Breath sounds: Normal breath sounds and air entry. No wheezing, rhonchi or rales.   Musculoskeletal:         General: No tenderness " or deformity. Normal range of motion.      Cervical back: Full passive range of motion without pain, normal range of motion and neck supple.      Thoracic back: Normal.      Right lower leg: No edema.      Left lower leg: No edema.   Skin:     General: Skin is warm and dry.      Coloration: Skin is not jaundiced.      Findings: No rash.   Neurological:      Mental Status: She is alert and oriented to person, place, and time.      Sensory: Sensation is intact.      Motor: Motor function is intact.      Coordination: Coordination is intact.      Gait: Gait is intact.      Deep Tendon Reflexes: Reflexes are normal and symmetric.   Psychiatric:         Mood and Affect: Mood and affect normal.         Behavior: Behavior normal.         Judgment: Judgment normal.               Result Review  Data Reviewed:                   Assessment and Plan      Diagnoses and all orders for this visit:    1. Wellness examination (Primary)  -     CBC Auto Differential; Future  -     Comprehensive Metabolic Panel; Future  -     Lipid Panel; Future  -     Vitamin D,25-Hydroxy; Future  -     Urinalysis With Culture If Indicated - Urine, Clean Catch; Future  -     TSH; Future  -     LILLIE by IFA, Reflex to Titer and Pattern; Future  -     Rheumatoid Factor; Future  -     C-reactive Protein; Future  -     Sedimentation Rate; Future  -     Uric Acid; Future    2. Chronic pain of left knee  -     XR Knee 3+ View With Sunrise Left; Future    3. Arthralgia, unspecified joint  -     LILLIE by IFA, Reflex to Titer and Pattern; Future  -     Rheumatoid Factor; Future  -     C-reactive Protein; Future  -     Sedimentation Rate; Future  -     Uric Acid; Future    4. Vitamin D deficiency  -     Vitamin D,25-Hydroxy; Future    5. Subacute otitis media, unspecified otitis media type  -     clindamycin (Cleocin) 300 MG capsule; Take 1 capsule by mouth 3 (Three) Times a Day.  Dispense: 20 capsule; Refill: 0    Patient is here today for a wellness exam  and  labs. Patient is due for a pap smear, recommended to have this completed.   Patients great aunt recently was diagnosed with breast CA and she states that she is only on her late 30's.  Patient states that she is having all over joint pain. She wants to be checked for RA.   Patient is also c/o left knee pain. She stepped in a hole and twisted her know a few weeks ago and now it is staying swollen and tender. All ROM causes pain. Having difficulty walking and going up stairs.   Patient also c/o ear pain. Right ear is red, tube is noted. Left ear canal is swollen I cannot see the tube at all.   Plan  Labs today  Xr of the left knee, consider mri  Abx for the ears        Follow Up   Return if symptoms worsen or fail to improve.  Patient was given instructions and counseling regarding her condition or for health maintenance advice. Please see specific information pulled into the AVS if appropriate.

## 2024-10-12 LAB
25(OH)D3 SERPL-MCNC: 19.3 NG/ML (ref 30–100)
CHROMATIN AB SERPL-ACNC: <10 IU/ML (ref 0–14)
CRP SERPL-MCNC: 0.41 MG/DL (ref 0–0.5)
TSH SERPL DL<=0.05 MIU/L-ACNC: 1.26 UIU/ML (ref 0.27–4.2)
URATE SERPL-MCNC: 3.4 MG/DL (ref 2.4–5.7)

## 2024-10-14 ENCOUNTER — TELEPHONE (OUTPATIENT)
Dept: FAMILY MEDICINE CLINIC | Facility: CLINIC | Age: 24
End: 2024-10-14
Payer: COMMERCIAL

## 2024-10-14 NOTE — TELEPHONE ENCOUNTER
----- Message from Yvette Salazar sent at 10/11/2024  4:18 PM CDT -----  Normal xray noted  Will order a mri

## 2024-10-15 ENCOUNTER — TELEPHONE (OUTPATIENT)
Dept: FAMILY MEDICINE CLINIC | Facility: CLINIC | Age: 24
End: 2024-10-15
Payer: COMMERCIAL

## 2024-10-15 LAB — ANA SER QL IF: NEGATIVE

## 2024-10-15 NOTE — TELEPHONE ENCOUNTER
Sent pt [employer] message relaying below    HUB TO RELAY  Your vitamin D is low. Bessy recommends you take 5,000 units of an over the counter supplement of vitamin D daily.   Your cholesterol is mildly elevated. Bessy said she will continue to monitor this in the future.  The rest of your labs were normal. Please let me know if you have any questions.

## 2024-10-15 NOTE — PROGRESS NOTES
Vitamin d is low. Take 5000 units otc daily  Cmp good  Total cholesterol mildly elevated. Will monitor. Watch diet  Tsh normal  Ra and jefe neg   Urine clear  Cbc clear

## 2024-10-15 NOTE — TELEPHONE ENCOUNTER
----- Message from Yvette Salazar sent at 10/15/2024  7:41 AM CDT -----  Vitamin d is low. Take 5000 units otc daily  Cmp good  Total cholesterol mildly elevated. Will monitor. Watch diet  Tsh normal  Ra and jefe neg   Urine clear  Cbc clear

## 2024-10-21 ENCOUNTER — OFFICE VISIT (OUTPATIENT)
Dept: NEUROLOGY | Age: 24
End: 2024-10-21
Payer: COMMERCIAL

## 2024-10-21 VITALS
HEIGHT: 64 IN | OXYGEN SATURATION: 97 % | WEIGHT: 236 LBS | DIASTOLIC BLOOD PRESSURE: 78 MMHG | HEART RATE: 88 BPM | SYSTOLIC BLOOD PRESSURE: 122 MMHG | BODY MASS INDEX: 40.29 KG/M2

## 2024-10-21 DIAGNOSIS — R56.9 CONVULSIONS, UNSPECIFIED CONVULSION TYPE (HCC): Primary | ICD-10-CM

## 2024-10-21 DIAGNOSIS — M54.2 NECK PAIN: ICD-10-CM

## 2024-10-21 DIAGNOSIS — R27.0 ATAXIA: ICD-10-CM

## 2024-10-21 PROCEDURE — 99214 OFFICE O/P EST MOD 30 MIN: CPT | Performed by: PSYCHIATRY & NEUROLOGY

## 2024-10-21 RX ORDER — LAMOTRIGINE 100 MG/1
150 TABLET ORAL 2 TIMES DAILY
Qty: 90 TABLET | Refills: 11 | Status: SHIPPED | OUTPATIENT
Start: 2024-10-21

## 2024-10-21 RX ORDER — BUTALBITAL, ACETAMINOPHEN AND CAFFEINE 50; 325; 40 MG/1; MG/1; MG/1
1 TABLET ORAL EVERY 4 HOURS PRN
COMMUNITY
Start: 2024-09-12

## 2024-10-21 NOTE — PROGRESS NOTES
[facility] Office Note      Patient:   Autumn Burt  MR#:    [id number]  Account Number:                         YOB: 2000  Date of Evaluation:  10/21/2024  Time of Note:                          9:51 AM  Primary/Referring Physician:  Martine Olson MD   Consulting Physician:  Chance Stafford, DO    FOLLOW UP VISIT     Chief Complaint   Patient presents with    Follow-up    Seizures     Last seizure was 10-, also having issues remembering things feels like it is from her seizures    Other     Right leg still shaking     Migraine       HISTORY OF PRESENT ILLNESS    Autumn Burt is a 24 y.o. year old female here for possible seizures, ataxia, speech difficulty.  Symptoms started a few months ago.  Patient had a seizure like episode, patient reports a DAMIEN, eyes rolled back, unresponsive briefly, noted some facial numbness, extremity numbness, possible mild GTC activity noted, confusion after, speech difficulty after.   Also noting more balance difficulty, headaches, and intermittent speech difficulty.  No history of TBI, meningitis or encephalitis.  Having daily events.  No family history of seizures.  Prior MRI brain with nothing acute, pineal cyst noted, unchanged.  Some anxiety and depression noted but has not overtly worsened.  Prior video EEG consistent with non-epileptic events, started on Lamictal for mood stabilization.  Doing about the same, events waxing and waning.       Past Medical History:   Diagnosis Date    Anxiety     Asthma     Gastritis        Past Surgical History:   Procedure Laterality Date    CHOLECYSTECTOMY  02/22/2017    UPPER GASTROINTESTINAL ENDOSCOPY  2017    [facility] [place] [facility]- normal per pt's mom    UPPER GASTROINTESTINAL ENDOSCOPY N/A 12/13/2023    Dr Eid, (-) Sprue, no lesions to explains symptoms,       Family History   Problem Relation Age of Onset    Rheum Arthritis Mother     Depression Mother     Colon Cancer Maternal Grandmother

## 2024-10-23 RX ORDER — METHOCARBAMOL 500 MG/1
500 TABLET, FILM COATED ORAL 4 TIMES DAILY
Qty: 120 TABLET | Refills: 0 | Status: SHIPPED | OUTPATIENT
Start: 2024-10-23

## 2024-10-25 ENCOUNTER — OFFICE VISIT (OUTPATIENT)
Dept: FAMILY MEDICINE CLINIC | Facility: CLINIC | Age: 24
End: 2024-10-25
Payer: COMMERCIAL

## 2024-10-25 VITALS
WEIGHT: 246 LBS | OXYGEN SATURATION: 99 % | HEIGHT: 64 IN | BODY MASS INDEX: 42 KG/M2 | SYSTOLIC BLOOD PRESSURE: 123 MMHG | DIASTOLIC BLOOD PRESSURE: 85 MMHG | HEART RATE: 90 BPM

## 2024-10-25 DIAGNOSIS — J45.41 MODERATE PERSISTENT ASTHMA WITH ACUTE EXACERBATION: Primary | ICD-10-CM

## 2024-10-25 PROCEDURE — 99213 OFFICE O/P EST LOW 20 MIN: CPT | Performed by: NURSE PRACTITIONER

## 2024-10-25 RX ORDER — DEXAMETHASONE SODIUM PHOSPHATE 4 MG/ML
8 INJECTION, SOLUTION INTRA-ARTICULAR; INTRALESIONAL; INTRAMUSCULAR; INTRAVENOUS; SOFT TISSUE ONCE
Status: COMPLETED | OUTPATIENT
Start: 2024-10-25 | End: 2024-10-25

## 2024-10-25 RX ORDER — LEVOFLOXACIN 500 MG/1
500 TABLET, FILM COATED ORAL DAILY
Qty: 5 TABLET | Refills: 0 | Status: SHIPPED | OUTPATIENT
Start: 2024-10-25

## 2024-10-25 RX ORDER — PHENOL 1.4 %
600 AEROSOL, SPRAY (ML) MUCOUS MEMBRANE DAILY
Qty: 90 TABLET | Refills: 3 | Status: SHIPPED | OUTPATIENT
Start: 2024-10-25

## 2024-10-25 RX ORDER — METHYLPREDNISOLONE 4 MG
TABLET, DOSE PACK ORAL
Qty: 1 EACH | Refills: 0 | Status: SHIPPED | OUTPATIENT
Start: 2024-10-25

## 2024-10-25 RX ADMIN — DEXAMETHASONE SODIUM PHOSPHATE 8 MG: 4 INJECTION, SOLUTION INTRA-ARTICULAR; INTRALESIONAL; INTRAMUSCULAR; INTRAVENOUS; SOFT TISSUE at 13:53

## 2024-10-25 NOTE — PROGRESS NOTES
"Chief Complaint  Cough, Sore Throat, Headache, Nasal Congestion, Vomiting, Earache (Bilateral, right worse), and Chills    Subjective    History of Present Illness      Patient presents to [facility] for   History of Present Illness  Pt presents today with Cough, Sore Throat, Headache, Nasal Congestion, Vomiting, Earache (Bilateral, right worse), and Chills. Ongoing since Wednesday       Review of Systems    I have reviewed and agree with the HPI information as above.  Yvette Salazar, APRN     Objective   Vital Signs:   /85   Pulse 90   Ht 162.6 cm (64.02\")   Wt 112 kg (246 lb)   SpO2 99%   BMI 42.20 kg/m²            Physical Exam  Vitals and nursing note reviewed.   Constitutional:       Appearance: Normal appearance.   HENT:      Head: Normocephalic and atraumatic.      Right Ear: Tympanic membrane is erythematous and bulging.      Left Ear: Tympanic membrane is erythematous and bulging.      Nose: Congestion present.      Mouth/Throat:      Pharynx: Posterior oropharyngeal erythema present.   Cardiovascular:      Rate and Rhythm: Normal rate and regular rhythm.      Pulses: Normal pulses.      Heart sounds: Normal heart sounds.   Pulmonary:      Effort: Pulmonary effort is normal.      Breath sounds: Decreased air movement present. Wheezing present.   Musculoskeletal:         General: Normal range of motion.      Cervical back: Normal range of motion and neck supple.   Skin:     General: Skin is warm and dry.   Neurological:      General: No focal deficit present.      Mental Status: She is alert and oriented to person, place, and time.   Psychiatric:         Mood and Affect: Mood normal.         Behavior: Behavior normal.              Result Review  Data Reviewed:                   Assessment and Plan      Diagnoses and all orders for this visit:    1. Moderate persistent asthma with acute exacerbation (Primary)  -     levoFLOXacin (Levaquin) 500 MG tablet; Take 1 " tablet by mouth Daily.  Dispense: 5 tablet; Refill: 0  -     methylPREDNISolone (MEDROL) 4 MG dose pack; Take as directed on package instructions.  Dispense: 1 each; Refill: 0  -     dexAMETHasone (DECADRON) injection 8 mg    Other orders  -     vitamin D3 (Vitamin D) 125 MCG (5000 UT) capsule capsule; Take 1 capsule by mouth Daily.  Dispense: 90 capsule; Refill: 3  -     calcium carbonate (Calcium 600) 600 MG tablet; Take 1 tablet by mouth Daily.  Dispense: 90 tablet; Refill: 3    Recently treated for strep. Patient states that she still does not feel well. Throat and ears do not look terrible. I believe it is more of an asthmatic flair at this time. Lung sounds are coarse with wheezing. Harsh barking cough noted.         Follow Up   No follow-ups on file.  Patient was given instructions and counseling regarding her condition or for health maintenance advice. Please see specific information pulled into the AVS if appropriate.

## 2024-11-06 ENCOUNTER — OFFICE VISIT (OUTPATIENT)
Dept: OTOLARYNGOLOGY | Facility: CLINIC | Age: 24
End: 2024-11-06
Payer: COMMERCIAL

## 2024-11-06 ENCOUNTER — PROCEDURE VISIT (OUTPATIENT)
Dept: OTOLARYNGOLOGY | Facility: CLINIC | Age: 24
End: 2024-11-06
Payer: COMMERCIAL

## 2024-11-06 VITALS
DIASTOLIC BLOOD PRESSURE: 80 MMHG | WEIGHT: 245 LBS | SYSTOLIC BLOOD PRESSURE: 142 MMHG | HEART RATE: 85 BPM | BODY MASS INDEX: 41.83 KG/M2 | HEIGHT: 64 IN | RESPIRATION RATE: 20 BRPM | TEMPERATURE: 98 F

## 2024-11-06 DIAGNOSIS — H74.42 GRANULATION POLYP OF LEFT MIDDLE EAR: ICD-10-CM

## 2024-11-06 DIAGNOSIS — Z96.22 S/P BILATERAL MYRINGOTOMY WITH TUBE PLACEMENT: Primary | ICD-10-CM

## 2024-11-06 DIAGNOSIS — H93.13 TINNITUS, BILATERAL: ICD-10-CM

## 2024-11-06 DIAGNOSIS — Z01.10 HEARING WITHIN NORMAL LIMITS IN BOTH EARS: Primary | ICD-10-CM

## 2024-11-06 RX ORDER — CIPROFLOXACIN AND DEXAMETHASONE 3; 1 MG/ML; MG/ML
4 SUSPENSION/ DROPS AURICULAR (OTIC) 2 TIMES DAILY
Qty: 7.5 ML | Refills: 0 | Status: SHIPPED | OUTPATIENT
Start: 2024-11-06

## 2024-11-06 NOTE — PROGRESS NOTES
AUDIOMETRIC EVALUATION      Name:  Lucrecia Lloyd  :  2000  Age:  24 y.o.  Date of Evaluation:  2024       History:  Ms. Lloyd is seen today for a hearing evaluation due to at the request of KRISTEN Underwood.    Audiologic Information:  Concerns for Hearing: Yes   PETs: BMT 10/4/2022  Other otologic surgical history: no  Aural Pressure/Fullness: no  Otalgia: Bilateral   Otorrhea: no  Tinnitus: Constant in the right, will ring for an hour in the left. Hearing heartbeat in the right ear.   Dizziness: Off-balance and room spinning dizziness for minutes at a time with no known trigger. Does experience nausea and falls with the dizzy spells  Noise Exposure: Loud headphone use   Family history of hearing loss: Maternal grandfather and maternal uncle   Head trauma requiring hospital stay: no  Chemotherapy: no  Other significant history: headaches and migraines multiple times a month,     **Case history obtained in office and through EMR system      EVALUATION:        RESULTS:    Otoscopic Evaluation:  Right: clear canal, tympanic membrane visualized  Left: PE tube visualized    Tympanometry (226 Hz):  Right: Type A  Left: Type A    Pure Tone Audiometry:    Bilateral: hearing within normal limits     Speech Audiometry:   Right: Speech Reception Threshold (SRT) was obtained at 10 dB HL  Word Recognition scores - Excellent (100)% at 50 dB, using NU-6 List 1A with 10 words  Left: Speech Reception Threshold (SRT) was obtained at 10 dB HL  Word Recognition scores - Excellent (100)% at 50 dB, using NU-6 List 2A with 10 words  SRT/PTA in good agreement bilaterally.    IMPRESSIONS:  Tympanometry showed normal middle ear pressure and static compliance, consistent with normal middle ear function for both ears.     Pure tone thresholds for both ears show hearing within normal limits, suggesting normal outer/middle ear function and normal cochlear/retrocochlear function.     Patient was counseled with regard to the  findings.      Diagnosis:  1. Hearing within normal limits in both ears    2. Tinnitus, bilateral         RECOMMENDATIONS/PLAN:  Follow-up recommendations per KRISTEN Underwood.  Practice good communication strategies to assist with everyday listening (eye contact with speakers, reduce background noise, encourage others to communicate clearly and slowly).  Tinnitus management strategies. Consider use of amplification, a white noise machine, low level TV/radio, or fan at night to help mask the tinnitus. It is also recommended to avoid caffeine, alcohol, tobacco, salt, high dose NSAIDs, and to increase hydration. Additional resources: Resound Relief kiel and American Tinnitus Association (www.beulah.org).  Repeat hearing evaluation after medical intervention.  Discussed results and recommendations with patient. Questions were addressed and the patient was encouraged to contact our department should concerns arise.        Laura Mckeon, CCC-A  Doctor of Audiology

## 2024-11-06 NOTE — PATIENT INSTRUCTIONS
>NASAL STEROID use:  Using nasal steroids:  You will be prescribed one of the following nasal steroids: Flonase, Nasacort, Nasonex, Rhinocort, Qnasl, Zetonna  2 puffs each nostril 2 times daily  Start as soon as possible  If you are using Afrin for 3 days with the nasal steroid,  Use Afrin first and wait 10 minutes to allow the nose to open. Then administer nasal steroids.   WATER PRECAUTIONS FOR EARS    Protecting your ears from water may sometimes be necessary for the health of your ears.     > Ear plugs: You may use earplugs: over the counter silicone plugs or a cotton ball coated with vasoline when bathing. If conservative measures are not working, consider obtaining molded earplugs from the audiology department to use while bathing or swimming.   Purchase inexpensive types that are most comfortable for you. You can make your own by using cotton balls mixed with a generous amount of Vaseline petroleum jelly. Gently place these in the ear canal.    > Dry the ear canal: after getting out of the shower or bath, use a hair dryer on low heat blowing in the ear for 10-15 seconds. Pulling gently backwards on the ear straightens the ear canal and allows the air to get further down.    > If you are to use ear drops, please place them in the ear canal and give them a few seconds to run down.  Follow this by blowing in the ear canal with a hair dryer set on low heat for approximately 10 to 15 seconds.  You may do this multiple times during the day to help keep the ear canal dry.    >If you are swimming frequently- place a drop of oil in each ear canal prior to entering water. After you are finished in the water, place a drop of vinegar in each ear canal. Use a hair dryer on low heat to blow in each ear canal for 10-15 seconds to dry ears out.

## 2024-11-06 NOTE — PROGRESS NOTES
KRISTEN Kaplan  [facility] EAR NOSE & THROAT  [address], [address]  [facility] [address]  Fax [phone]  Phone [phone]      Visit Type: NEW PATIENT   Chief Complaint   Patient presents with    Follow-up     EAR PROBLEMS / TUBES           HISTORY OBTAINED FROM: patient  Follow-up      Lucrecia Lloyd is a 24 y.o. female who presents for evaluation of EAR complaints. She reports chronic history of ear problems for years. She admits ear pain and recurrent ear infections. She feels like she can not hear well. She denies ringing tinnitus but reports hearing her heart beat in her ear over the last couple of months. She denies ear drainage.  She has had some vertigo in the past but not recently.     She has been seen by [facility] and [facility]. Had tubes placed at [facility] but states she is still having problems. Has not followed up with them recently, last time she saw them was in Oct of 22 when she had tubes placed.   She is currently not on nasal sprays, states they caused severe nose bleeds and had to have nose cauterized.     Past Medical History:   Diagnosis Date    Abdominal pain     r/t gallbladder    Acid reflux     Asthma     Migraines     Nausea     Sinusitis        Past Surgical History:   Procedure Laterality Date    CHOLECYSTECTOMY      CHOLECYSTECTOMY WITH INTRAOPERATIVE CHOLANGIOGRAM N/A 01/20/2017    Procedure: CHOLECYSTECTOMY, LAPAROSCOPIC;  Surgeon: Lo De La Torre MD;  Location: [facility];  Service:     TOOTH EXTRACTION      all top teeth removed    TYMPANOSTOMY TUBE PLACEMENT         Family History: Her family history is not on file.     Social History: She  reports that she has never smoked. She has never used smokeless tobacco. She reports that she does not drink alcohol and does not use drugs.    Home Medications:  EPINEPHrine, Fluticasone-Salmeterol, albuterol, albuterol sulfate HFA, butalbital-acetaminophen-caffeine, calcium  carbonate, ciprofloxacin-dexAMETHasone, lamoTRIgine, levoFLOXacin, methocarbamol, methylPREDNISolone, montelukast, ondansetron, pantoprazole, promethazine, sertraline, ubrogepant, and vitamin D3    Allergies:  She is allergic to hydrocodone-acetaminophen, amoxicillin, azithromycin, cefuroxime, and hydrocodone.       Vital Signs:   Temp:  [98 °F (36.7 °C)] 98 °F (36.7 °C)  Heart Rate:  [85] 85  Resp:  [20] 20  BP: (142)/(80) 142/80  ENT Physical Exam  Constitutional  Appearance: patient appears well-developed, well-nourished and well-groomed,  Communication/Voice: communication appropriate for developmental age; vocal quality normal;  Head and Face  Appearance: head appears normal, face appears normal and face appears atraumatic;  Palpation: TMJ tender on the right; temporalis muscle, masseter muscle and medial pterygoid muscle not tender on the right;  Salivary: glands normal;  Ear  Hearing: intact;  Auricles: bilateral auricles normal;  External Mastoids: bilateral external mastoids normal;  Ear Canals: bilateral ear canals normal;  Tympanic Membranes: bilateral tympanic membranes normal;  Nose  External Nose: nares patent bilaterally; external nose normal;  Internal Nose: nasal mucosa normal; bilateral inferior turbinates normal;  Oral Cavity/Oropharynx  Lips: normal;  Teeth: missing teeth (all) and dentures noted;  Gums: gingiva normal;  Tongue: normal;  Oral mucosa: normal;  Hard palate: normal;  Soft palate: normal;  Tonsils: normal;  Base of Tongue: normal;  Posterior pharyngeal wall: normal;  Neck  Neck: neck normal; neck palpation normal;  Respiratory  Inspection: breathing unlabored; normal breathing rate;  Cardiovascular  Inspection: extremities are warm and well perfused;  Neurovestibular  Mental Status: alert and oriented;               Result Review       RESULTS REVIEW    I have reviewed the patients old records in the chart.   The following results/records were reviewed:     Procedure visit with  Laura Crawford Au.D (11/06/2024)          Assessment & Plan  S/p bilateral myringotomy with tube placement    Granulation polyp of left middle ear           New Medications Ordered This Visit   Medications    ciprofloxacin-dexAMETHasone (CIPRODEX) 0.3-0.1 % otic suspension     Sig: Administer 4 drops into the left ear 2 (Two) Times a Day.     Dispense:  7.5 mL     Refill:  0     Audio obtained and reviewed in office with patient.  Grossly normal-appearing bilaterally.  She has type a tympanometry bilaterally.  Otoscopic exam is relatively normal to the right ear, TM is intact with no notable tube.  Left exam reveals tube present, appears intact, there appears to be granulation polyp surrounding tube.  I will start her on Ciprodex and plan for close follow-up for reassessment, further recommendations to be made based on follow-up.  If granulation does not improve, may need to consider surgical movable tube.      Medical and surgical options were discussed including observation, continued medical management, medication modification, and surgical management. Risks, benefits and alternatives were discussed and questions were answered. After considering the options, the patient decided to proceed with medication modification.  Call for ear problems, especially change of hearing, ear pain or dizziness.  Protect getting water in the ears. If needed, may use over the counter silicone plugs or a cotton ball coated with vasoline when bathing.  Use hairdryer on a cool setting after bathing.  Use hearing protection in loud noise situations.  Start Ciprodex  Water precautions  Stop Q-tip use  Start Flonase  Pop ears    Call questions or concerns    Return in about 2 weeks (around 11/20/2024) for Recheck.        Electronically signed by KRISTEN Kaplan 11/06/24 3:57 PM CST.

## 2024-11-08 ENCOUNTER — TELEPHONE (OUTPATIENT)
Dept: FAMILY MEDICINE CLINIC | Facility: CLINIC | Age: 24
End: 2024-11-08
Payer: COMMERCIAL

## 2024-11-08 ENCOUNTER — PATIENT MESSAGE (OUTPATIENT)
Dept: NEUROLOGY | Age: 24
End: 2024-11-08

## 2024-11-08 DIAGNOSIS — J45.41 MODERATE PERSISTENT ASTHMA WITH ACUTE EXACERBATION: Primary | ICD-10-CM

## 2024-11-08 DIAGNOSIS — G89.29 CHRONIC PAIN OF LEFT KNEE: Primary | ICD-10-CM

## 2024-11-08 DIAGNOSIS — M25.562 CHRONIC PAIN OF LEFT KNEE: Primary | ICD-10-CM

## 2024-11-08 RX ORDER — PREDNISONE 10 MG/1
TABLET ORAL
Qty: 1 EACH | Refills: 0 | Status: SHIPPED | OUTPATIENT
Start: 2024-11-08

## 2024-11-08 RX ORDER — DOXYCYCLINE 100 MG/1
100 CAPSULE ORAL 2 TIMES DAILY
Qty: 20 CAPSULE | Refills: 0 | Status: SHIPPED | OUTPATIENT
Start: 2024-11-08

## 2024-11-12 DIAGNOSIS — M25.562 CHRONIC PAIN OF LEFT KNEE: Primary | ICD-10-CM

## 2024-11-12 DIAGNOSIS — G89.29 CHRONIC PAIN OF LEFT KNEE: Primary | ICD-10-CM

## 2024-11-13 DIAGNOSIS — I10 ESSENTIAL HYPERTENSION: Primary | ICD-10-CM

## 2024-11-13 RX ORDER — CLONIDINE HYDROCHLORIDE 0.1 MG/1
0.1 TABLET ORAL 3 TIMES DAILY PRN
Qty: 90 TABLET | Refills: 2 | Status: SHIPPED | OUTPATIENT
Start: 2024-11-13

## 2024-11-13 RX ORDER — LOSARTAN POTASSIUM 25 MG/1
25 TABLET ORAL DAILY
Qty: 30 TABLET | Refills: 2 | Status: SHIPPED | OUTPATIENT
Start: 2024-11-13

## 2024-11-13 NOTE — TELEPHONE ENCOUNTER
Patient called stating that her BP is running 150-2160/ . She is having chest pain and left arm pain. She has been evaluated by ER but no medications have been started. She recently started lamictal and her neurologist believes that is causing the elevated bp. She is waiting to get an appointment with neuro to change her seizure medication around. In the mean time, I will start BP medication to help control this. Patient v/u

## 2024-11-20 ENCOUNTER — OFFICE VISIT (OUTPATIENT)
Dept: OTOLARYNGOLOGY | Facility: CLINIC | Age: 24
End: 2024-11-20
Payer: COMMERCIAL

## 2024-11-20 VITALS
HEART RATE: 83 BPM | BODY MASS INDEX: 45.24 KG/M2 | SYSTOLIC BLOOD PRESSURE: 100 MMHG | WEIGHT: 265 LBS | HEIGHT: 64 IN | TEMPERATURE: 98.2 F | DIASTOLIC BLOOD PRESSURE: 71 MMHG

## 2024-11-20 DIAGNOSIS — Z01.10 HEARING WITHIN NORMAL LIMITS IN BOTH EARS: ICD-10-CM

## 2024-11-20 DIAGNOSIS — H93.13 TINNITUS, BILATERAL: ICD-10-CM

## 2024-11-20 DIAGNOSIS — H74.42 GRANULATION POLYP OF LEFT MIDDLE EAR: ICD-10-CM

## 2024-11-20 DIAGNOSIS — Z96.22 S/P BILATERAL MYRINGOTOMY WITH TUBE PLACEMENT: Primary | ICD-10-CM

## 2024-11-20 RX ORDER — PREDNISOLONE ACETATE 10 MG/ML
SUSPENSION/ DROPS OPHTHALMIC
Qty: 10 ML | Refills: 3 | Status: SHIPPED | OUTPATIENT
Start: 2024-11-20

## 2024-11-20 NOTE — PROGRESS NOTES
KRISTEN Kaplan  [facility] EAR NOSE & THROAT  [address], [address]  Located within [facility] [address]  Fax [phone]  Phone [phone]      Visit Type: FOLLOW UP   Chief Complaint   Patient presents with    Ear Tube Follow-up           HISTORY OBTAINED FROM: patient  HPI  Lucrecia Lloyd is a 24 y.o. female who presents for recheck of ear complaints.She reports chronic history of ear problems for years. She admits ear pain and recurrent ear infections. She feels like she can not hear well. She denies ringing tinnitus but reports hearing her heart beat in her right ear over the last couple of months. She denies ear drainage. She has had some vertigo in the past but not recently.     She was started on Ciprodex 2 weeks ago for suspected granulation polyp to the left TM.  Since her last visit she reports      Past Medical History:   Diagnosis Date    Abdominal pain     r/t gallbladder    Acid reflux     Asthma     Migraines     Nausea     Sinusitis        Past Surgical History:   Procedure Laterality Date    CHOLECYSTECTOMY      CHOLECYSTECTOMY WITH INTRAOPERATIVE CHOLANGIOGRAM N/A 01/20/2017    Procedure: CHOLECYSTECTOMY, LAPAROSCOPIC;  Surgeon: Lo De La Torre MD;  Location: [facility];  Service:     TOOTH EXTRACTION      all top teeth removed    TYMPANOSTOMY TUBE PLACEMENT         Family History: Her family history is not on file.     Social History: She  reports that she has never smoked. She has never used smokeless tobacco. She reports that she does not drink alcohol and does not use drugs.    Home Medications:  EPINEPHrine, Fluticasone-Salmeterol, albuterol, albuterol sulfate HFA, butalbital-acetaminophen-caffeine, calcium carbonate, cloNIDine, doxycycline, lamoTRIgine, levoFLOXacin, losartan, methocarbamol, methylPREDNISolone, montelukast, ondansetron, pantoprazole, predniSONE, prednisoLONE acetate, promethazine, sertraline, ubrogepant, and vitamin  D3    Allergies:  She is allergic to hydrocodone-acetaminophen, amoxicillin, azithromycin, cefuroxime, and hydrocodone.       Vital Signs:   Temp:  [98.2 °F (36.8 °C)] 98.2 °F (36.8 °C)  Heart Rate:  [83] 83  BP: (100)/(71) 100/71  ENT Physical Exam  Constitutional  Appearance: patient appears well-developed, well-nourished and well-groomed,  Communication/Voice: communication appropriate for developmental age; vocal quality normal;  Head and Face  Appearance: head appears normal, face appears normal and face appears atraumatic;  Palpation: TMJ tender on the right; temporalis muscle, masseter muscle and medial pterygoid muscle not tender on the right;  Salivary: glands normal;  Ear  Hearing: intact;  Auricles: bilateral auricles normal;  External Mastoids: bilateral external mastoids normal;  Ear Canals: bilateral ear canals normal;  Tympanic Membranes: right tympanic membrane normal; left tympanic membrane abnormal; with granulation tissue; tympanostomy tube noted; tilted tube;  Nose  External Nose: nares patent bilaterally; external nose normal;  Internal Nose: nasal mucosa normal; bilateral inferior turbinates normal;  Oral Cavity/Oropharynx  Lips: normal;  Teeth: missing teeth (all) and dentures noted;  Gums: gingiva normal;  Tongue: normal;  Oral mucosa: normal;  Hard palate: normal;  Soft palate: normal;  Tonsils: normal;  Base of Tongue: normal;  Posterior pharyngeal wall: normal;  Neck  Neck: neck normal; neck palpation normal;  Respiratory  Inspection: breathing unlabored; normal breathing rate;  Cardiovascular  Inspection: extremities are warm and well perfused;  Neurovestibular  Mental Status: alert and oriented;               Result Review       RESULTS REVIEW    I have reviewed the patients old records in the chart.           Assessment & Plan  S/p bilateral myringotomy with tube placement    Granulation polyp of left middle ear    Hearing within normal limits in both ears    Tinnitus, bilateral            New Medications Ordered This Visit   Medications    prednisoLONE acetate (Pred Forte) 1 % ophthalmic suspension     Sig: Use 2-3 drops in AU TID     Dispense:  10 mL     Refill:  3         Medical and surgical options were discussed including observation, continued medical management, medication modification, and surgical management. Risks, benefits and alternatives were discussed and questions were answered. After considering the options, the patient decided to proceed with medication modification.  Call for ear problems, especially change of hearing, ear pain or dizziness.  Protect getting water in the ears. If needed, may use over the counter silicone plugs or a cotton ball coated with vasoline when bathing.  Use hairdryer on a cool setting after bathing.  Use hearing protection in loud noise situations.  Stop ciprodex, start predforte  Water precautions  Continue flonase  Pop ears    Call with questions or concerns    No follow-ups on file.        Electronically signed by KRISTEN Kaplan 11/20/24 3:33 PM CST.

## 2024-11-20 NOTE — PATIENT INSTRUCTIONS
>NASAL STEROID use:  Using nasal steroids:  You will be prescribed one of the following nasal steroids: Flonase, Nasacort, Nasonex, Rhinocort, Qnasl, Zetonna  2 puffs each nostril 2 times daily  Start as soon as possible  If you are using Afrin for 3 days with the nasal steroid,  Use Afrin first and wait 10 minutes to allow the nose to open. Then administer nasal steroids.   >NASAL SALINE:  Use 2 puffs each nostril 4-6 times daily and more frequently if possible.  You can buy saline spray or you can make your own and use an old spray bottle to administer  Use a humidifier at bedside  Recipe for saline:  Water                                 1 quart  Salt (table)                        1 tablespoon  Gylcerin (or Ale Syrup)    1 teaspoon  Sodium bicarbonate           1 teaspoon  Sprays or Courtney pots are recommended    Do not allow to stand for more than 24 hrs. Make new solution. There is no preservative in this solution.    Sinus irrigation and saline application can be enhanced with various over-the-counter products.  A WaterPik can be quite useful to irrigate, especially following sinus surgery.  Navage makes a product that irrigates the nose and some of the sinuses.  NeilMed makes a Sinu-Gator to irrigate the sinuses.  Neomed also has canned saline that we will come out under pressure.  A Courtney pot can also be helpful.  All of these products help keep the nose clear of debris.  Please use as directed on the instructions that come with the particular device.   How to pop ears:  Pop your ears by holding nose and closing mouth, then blow hard until ears pop  Children (less than 8-9 years)- blow up ballons 4 times a day and more frequently   WATER PRECAUTIONS FOR EARS    Protecting your ears from water may sometimes be necessary for the health of your ears.     > Ear plugs: You may use earplugs: over the counter silicone plugs or a cotton ball coated with vasoline when bathing. If conservative measures are not  working, consider obtaining molded earplugs from the audiology department to use while bathing or swimming.   Purchase inexpensive types that are most comfortable for you. You can make your own by using cotton balls mixed with a generous amount of Vaseline petroleum jelly. Gently place these in the ear canal.    > Dry the ear canal: after getting out of the shower or bath, use a hair dryer on low heat blowing in the ear for 10-15 seconds. Pulling gently backwards on the ear straightens the ear canal and allows the air to get further down.    > If you are to use ear drops, please place them in the ear canal and give them a few seconds to run down.  Follow this by blowing in the ear canal with a hair dryer set on low heat for approximately 10 to 15 seconds.  You may do this multiple times during the day to help keep the ear canal dry.

## 2024-11-26 ENCOUNTER — TELEPHONE (OUTPATIENT)
Dept: OTOLARYNGOLOGY | Facility: CLINIC | Age: 24
End: 2024-11-26
Payer: COMMERCIAL

## 2024-11-26 DIAGNOSIS — Z96.22 S/P BILATERAL MYRINGOTOMY WITH TUBE PLACEMENT: Primary | ICD-10-CM

## 2024-11-26 DIAGNOSIS — H93.13 TINNITUS, BILATERAL: ICD-10-CM

## 2024-11-26 DIAGNOSIS — H93.A9 PULSATILE TINNITUS: ICD-10-CM

## 2024-11-26 NOTE — TELEPHONE ENCOUNTER
Left Warren RIDDLE is ordering CT Temporal bones, they will call you to [facility] that once insurance approves, continue using hearing protection from loud noises, decrease sodium and increase water intake. Call office with any questions

## 2024-12-02 NOTE — TELEPHONE ENCOUNTER
Rx Refill Note  Requested Prescriptions     Pending Prescriptions Disp Refills    sertraline (ZOLOFT) 100 MG tablet [Pharmacy Med Name: SERTRALINE  MG TABLET] 135 tablet 1     Sig: TAKE 1 AND 1/2 TABLET BY MOUTH DAILY      Last office visit with prescribing clinician: 10/25/2024   Last telemedicine visit with prescribing clinician: Visit date not found   Next office visit with prescribing clinician: Visit date not found

## 2024-12-03 RX ORDER — SERTRALINE HYDROCHLORIDE 100 MG/1
150 TABLET, FILM COATED ORAL DAILY
Qty: 60 TABLET | Refills: 0 | Status: SHIPPED | OUTPATIENT
Start: 2024-12-03

## 2024-12-05 ENCOUNTER — PATIENT MESSAGE (OUTPATIENT)
Dept: NEUROLOGY | Age: 24
End: 2024-12-05

## 2024-12-05 DIAGNOSIS — R56.9 SEIZURE-LIKE ACTIVITY: ICD-10-CM

## 2024-12-05 DIAGNOSIS — Z12.31 ENCOUNTER FOR SCREENING MAMMOGRAM FOR MALIGNANT NEOPLASM OF BREAST: ICD-10-CM

## 2024-12-05 DIAGNOSIS — I10 ESSENTIAL HYPERTENSION: Primary | ICD-10-CM

## 2024-12-13 ENCOUNTER — HOSPITAL ENCOUNTER (OUTPATIENT)
Dept: CT IMAGING | Facility: HOSPITAL | Age: 24
Discharge: HOME | End: 2024-12-13
Admitting: NURSE PRACTITIONER
Payer: COMMERCIAL

## 2024-12-13 ENCOUNTER — OFFICE VISIT (OUTPATIENT)
Dept: OTOLARYNGOLOGY | Facility: CLINIC | Age: 24
End: 2024-12-13
Payer: COMMERCIAL

## 2024-12-13 VITALS
HEIGHT: 64 IN | WEIGHT: 263 LBS | HEART RATE: 80 BPM | TEMPERATURE: 98.7 F | BODY MASS INDEX: 44.9 KG/M2 | DIASTOLIC BLOOD PRESSURE: 87 MMHG | SYSTOLIC BLOOD PRESSURE: 118 MMHG

## 2024-12-13 DIAGNOSIS — H74.42 GRANULATION POLYP OF LEFT MIDDLE EAR: ICD-10-CM

## 2024-12-13 DIAGNOSIS — H93.A9 PULSATILE TINNITUS: ICD-10-CM

## 2024-12-13 DIAGNOSIS — Z01.10 HEARING WITHIN NORMAL LIMITS IN BOTH EARS: ICD-10-CM

## 2024-12-13 DIAGNOSIS — H93.13 TINNITUS, BILATERAL: ICD-10-CM

## 2024-12-13 DIAGNOSIS — M26.623 TMJ TENDERNESS, BILATERAL: ICD-10-CM

## 2024-12-13 DIAGNOSIS — Z96.22 S/P BILATERAL MYRINGOTOMY WITH TUBE PLACEMENT: Primary | ICD-10-CM

## 2024-12-13 LAB
NCCN CRITERIA FLAG: NORMAL
TYRER CUZICK SCORE: 12.4

## 2024-12-13 PROCEDURE — 70482 CT ORBIT/EAR/FOSSA W/O&W/DYE: CPT

## 2024-12-13 PROCEDURE — 25510000001 IOPAMIDOL 61 % SOLUTION: Performed by: NURSE PRACTITIONER

## 2024-12-13 RX ORDER — IOPAMIDOL 612 MG/ML
100 INJECTION, SOLUTION INTRAVASCULAR
Status: COMPLETED | OUTPATIENT
Start: 2024-12-13 | End: 2024-12-13

## 2024-12-13 RX ADMIN — IOPAMIDOL 100 ML: 612 INJECTION, SOLUTION INTRAVENOUS at 13:50

## 2024-12-13 NOTE — PROGRESS NOTES
KRISTEN Kaplan  [facility] EAR NOSE & THROAT  [address], [address]  [facility] [address]  Fax [phone]  Phone [phone]      Visit Type: FOLLOW UP   Chief Complaint   Patient presents with    Ear Problem     Ear follow up with ear drops to see if it has been effective.           HISTORY OBTAINED FROM: patient  HPI  Lucrecia Lloyd is a 24 y.o. female who presents for follow up evaluation. She reports chronic history of ear problems for years. She admits ear pain and recurrent ear infections. She feels like she can not hear well. She denies ringing tinnitus but reports hearing her heart beat in her right ear over the last couple of months. She denies ear drainage. She has had some vertigo in the past but not recently.     Since last visit she reports no real change in symptoms. She did stop taking flonase a couple weeks ago, nosebleeds. She was put on losartan for hypertension by PCP recently.  She does feel that this may have improved her pulsatile tinnitus some.  She denies notable change in ear pain.    Past Medical History:   Diagnosis Date    Abdominal pain     r/t gallbladder    Acid reflux     Asthma     Migraines     Nausea     Sinusitis        Past Surgical History:   Procedure Laterality Date    CHOLECYSTECTOMY      CHOLECYSTECTOMY WITH INTRAOPERATIVE CHOLANGIOGRAM N/A 01/20/2017    Procedure: CHOLECYSTECTOMY, LAPAROSCOPIC;  Surgeon: Lo De La Torre MD;  Location: [facility];  Service:     TOOTH EXTRACTION      all top teeth removed    TYMPANOSTOMY TUBE PLACEMENT         Family History: Her family history is not on file.     Social History: She  reports that she has never smoked. She has never used smokeless tobacco. She reports that she does not drink alcohol and does not use drugs.    Home Medications:  EPINEPHrine, Fluticasone-Salmeterol, albuterol, albuterol sulfate HFA, butalbital-acetaminophen-caffeine, calcium carbonate,  cloNIDine, doxycycline, lamoTRIgine, levoFLOXacin, losartan, methocarbamol, methylPREDNISolone, montelukast, ondansetron, pantoprazole, predniSONE, prednisoLONE acetate, promethazine, sertraline, ubrogepant, and vitamin D3    Allergies:  She is allergic to hydrocodone-acetaminophen, amoxicillin, azithromycin, cefuroxime, and hydrocodone.       Vital Signs:   Temp:  [98.7 °F (37.1 °C)] 98.7 °F (37.1 °C)  Heart Rate:  [80] 80  BP: (118)/(87) 118/87  ENT Physical Exam  Constitutional  Appearance: patient appears well-developed, well-nourished and well-groomed,  Communication/Voice: communication appropriate for developmental age; vocal quality normal;  Head and Face  Appearance: head appears normal, face appears normal and face appears atraumatic;  Palpation: TMJ tender on the right; temporalis muscle, masseter muscle and medial pterygoid muscle not tender on the right; TMJ tender on the left; temporalis muscle, masseter muscle and medial pterygoid muscle not tender on the left;  Salivary: glands normal;  Ear  Hearing: intact;  Auricles: bilateral auricles normal;  External Mastoids: bilateral external mastoids normal;  Ear Canals: bilateral ear canals normal;  Tympanic Membranes: right tympanic membrane normal; left tympanic membrane abnormal; with granulation tissue; tympanostomy tube noted; tilted tube;  Nose  External Nose: nares patent bilaterally; external nose normal;  Internal Nose: nasal mucosa normal; bilateral inferior turbinates normal;  Oral Cavity/Oropharynx  Lips: normal;  Teeth: missing teeth (all) and dentures noted;  Gums: gingiva normal;  Tongue: normal;  Oral mucosa: normal;  Hard palate: normal;  Soft palate: normal;  Tonsils: normal;  Base of Tongue: normal;  Posterior pharyngeal wall: normal;  Neck  Neck: neck normal; neck palpation normal;  Respiratory  Inspection: breathing unlabored; normal breathing rate;  Cardiovascular  Inspection: extremities are warm and well  perfused;  Neurovestibular  Mental Status: alert and oriented;               Result Review       RESULTS REVIEW    I have reviewed the patients old records in the chart.             Assessment & Plan  S/p bilateral myringotomy with tube placement    Tinnitus, bilateral    Pulsatile tinnitus    Granulation polyp of left middle ear    Hearing within normal limits in both ears    TMJ tenderness, bilateral                Otoscopic exam today appears improved.  Right ear appears relatively normal.  TM is intact, dry and patent.  Left ear with tube intact, dry and patent.  Does appear to still have some granulation tissue around superior and posterior aspect of the tube base but appears to be improving.  No drainage today.  She does also persist with some general TMJ tenderness, bilaterally today.  She has not been using TMJ recommendations.  She is scheduled for her CT scan later this afternoon.  I will plan to continue with current treatment plan, will review CT scan once results return we will call with further recommendations if needed.    Obtain CT as scheduled, will call with results.  Continue Flonase  Pop ears  Restart Pred forte x 14 days  Dry ear precautions  Start TMJ interventions and exercises  TMJ exercises  Heat, ice, massage area  NSAID use  Mouthguard for sleep      Call with questions or concerns  Return in about 6 weeks (around 1/24/2025) for Recheck ears.        Electronically signed by KRISTEN Kaplan 12/13/24 12:21 PM CST.

## 2024-12-13 NOTE — PATIENT INSTRUCTIONS
>NASAL STEROID use:  Using nasal steroids:  You will be prescribed one of the following nasal steroids: Flonase, Nasacort, Nasonex, Rhinocort, Qnasl, Zetonna  2 puffs each nostril 2 times daily  Start as soon as possible  If you are using Afrin for 3 days with the nasal steroid,  Use Afrin first and wait 10 minutes to allow the nose to open. Then administer nasal steroids.   >NASAL SALINE:  Use 2 puffs each nostril 4-6 times daily and more frequently if possible.  You can buy saline spray or you can make your own and use an old spray bottle to administer  Use a humidifier at bedside  Recipe for saline:  Water                                 1 quart  Salt (table)                        1 tablespoon  Gylcerin (or Ale Syrup)    1 teaspoon  Sodium bicarbonate           1 teaspoon  Sprays or Courtney pots are recommended    Do not allow to stand for more than 24 hrs. Make new solution. There is no preservative in this solution.    Sinus irrigation and saline application can be enhanced with various over-the-counter products.  A WaterPik can be quite useful to irrigate, especially following sinus surgery.  Navage makes a product that irrigates the nose and some of the sinuses.  NeilMed makes a Sinu-Gator to irrigate the sinuses.  Neomed also has canned saline that we will come out under pressure.  A Courtney pot can also be helpful.  All of these products help keep the nose clear of debris.  Please use as directed on the instructions that come with the particular device.   How to pop ears:  Pop your ears by holding nose and closing mouth, then blow hard until ears pop  Children (less than 8-9 years)- blow up ballons 4 times a day and more frequently     TEMPOROMANDIBULAR JOINT EXERCISES     The temporomandibular joint, or the TMJ, is the jaw joint. This joint can frequently be a source of pain in the head and neck region. Typically because of its location, pain is felt either in area just in front of the ear, or in the ear  itself. However, pain in the TMJ can be referred to any part of the head and neck.     An examination by the physician frequently reveals tenderness around the joint. Oftentimes, a crack or pop can also be felt. This may be an indication that the pain is in all actuality coming from the joint. An exhaustive search for a cause is carried out, in an effort to determine the etiology of the pain. Pain can be caused by grinding of teeth at night (bruxism), overuse (gum chewing, ice cracking, over opening mouth), poor dentition and malocclusion (bad bite). In an attempt to be thorough, your physician may involve others who have experience in this field, such as oral surgeons, dentists and pain experts.     Should you be diagnosed with TMJ pain, a course of conservative treatment is indicated, as this works in an overwhelming majority of cases. Because this pain originates from a joint, the treatment is similar to treatment for pain in other joints.  Treatment     Rest:  This is indicated to relieve the pressure on the joint. No chewing gum, tough meat, hard bread, cracking ice in the teeth, or any other activity that places undue stress on the joint. Simply, if it causes it to hurt, stop doing it. This may be required for an unspecified period of time, usually 1 to 2 weeks.     Cold to the area:  This will reduce swelling and pain early in the course.     Heat to the area:  This improves blood flow to the area and speeds healing.     Pain Relievers:  Anti-inflammatory medications, such as aspirin, Motrin, Advil, Nuprin, Aleve or any other products in this category are satisfactory to use in the face of joint pain. Rarely are narcotics required, except possibly in the acute phase to gain some initial relief.  Dr. Dawson may administer pain blocks to relieve severe pain and spasm. Nerve blocks may also be used.    Recommendations:  ?       Reduce/eliminate caffeinated drinks  ?       Reduce high sugar drinks and foods  ?        Get plenty of rest  ?       Practice relaxation techniques; Yoga, Biofeedback, Prem Chi, etc.  ?       Exercise for overall fitness  ?       Eat healthy- High fiber, low fat/cholesterol eating, and change eating habits. We recommend Sugar Busters as a lifestyle change for eating.  ?       See your dentist regularly for checkups and bite checks.  Rehabilitation:  Once the acute pain has been controlled, you should begin exercises to strengthen and rehabilitate the TMJ.     Exercises: These should be performed for five minutes at least five times each day     Slowly open the mouth to its fullest extent, even using the fingers to exert gentle pressure.     Open and close the mouth as widely and rapidly as possible.     Open and close the mouth against resistance by placing the open palm underneath the chin, or the fingers on top of the chin.     Move the lower jaw side to side without resistance. Later, add resistance by placing both hands on either side of the jaw.     Push (protrude) the lower jaw without resistance. Later add resistance.     Should the above regimen fail to lead to improvement, your physician will discuss with you other forms of therapy. Since almost all types of TMJ pain respond to conservative therapy, surgery is indicated only after exhausting the rehabilitation. Dr. Dawson does not perform rehabilitative surgery on the temporomandibular joint, but refers patients to an oral surgery who specialize in this type of surgery.

## 2024-12-16 ENCOUNTER — TELEPHONE (OUTPATIENT)
Dept: OTOLARYNGOLOGY | Facility: CLINIC | Age: 24
End: 2024-12-16
Payer: COMMERCIAL

## 2024-12-16 NOTE — TELEPHONE ENCOUNTER
Nurse attempted to contact patient regarding CT results with reminder of scheduled appointment on 1/23/25.

## 2024-12-27 ENCOUNTER — TELEPHONE (OUTPATIENT)
Dept: OTOLARYNGOLOGY | Facility: CLINIC | Age: 24
End: 2024-12-27
Payer: COMMERCIAL

## 2024-12-27 NOTE — TELEPHONE ENCOUNTER
Patient returned call for CT results.  Nurse replied the following message from Warren PETERSON----- Message from Warren Antoine sent at 12/16/2024  8:53 AM CST -----  CT of the temporal bones is normal per radiologist.  Advise continue treatment as discussed during visit and follow-up as scheduled to recheck ear pain and granulation polyp to the left ear.  Advise focus on TMJ recommendations.  Call with any questions concerns develop any symptoms.    Nurse reviewed upcoming appt. On 1/23/25.  Patient verbalized understanding and had no further questions at this time.

## 2024-12-27 NOTE — TELEPHONE ENCOUNTER
Nurse attempted to call patient with CT scan results.  Nurse left message to call back to speak to MA or nurse.

## 2025-01-24 DIAGNOSIS — G43.009 MIGRAINE WITHOUT AURA AND RESPONSIVE TO TREATMENT: ICD-10-CM

## 2025-01-27 ENCOUNTER — HOSPITAL ENCOUNTER (OUTPATIENT)
Dept: CARDIOLOGY | Facility: HOSPITAL | Age: 25
Discharge: HOME | End: 2025-01-27
Payer: COMMERCIAL

## 2025-01-27 ENCOUNTER — HOSPITAL ENCOUNTER (OUTPATIENT)
Dept: MAMMOGRAPHY | Facility: HOSPITAL | Age: 25
Discharge: HOME | End: 2025-01-27
Payer: COMMERCIAL

## 2025-01-27 DIAGNOSIS — Z12.31 ENCOUNTER FOR SCREENING MAMMOGRAM FOR MALIGNANT NEOPLASM OF BREAST: ICD-10-CM

## 2025-01-27 DIAGNOSIS — I10 ESSENTIAL HYPERTENSION: ICD-10-CM

## 2025-01-27 DIAGNOSIS — R56.9 SEIZURE-LIKE ACTIVITY: ICD-10-CM

## 2025-01-27 LAB — BH CV TILT AGENT USED: NORMAL

## 2025-01-27 PROCEDURE — 93660 TILT TABLE EVALUATION: CPT

## 2025-01-27 RX ORDER — NITROGLYCERIN 0.4 MG/1
0.4 TABLET SUBLINGUAL ONCE
Status: COMPLETED | OUTPATIENT
Start: 2025-01-27 | End: 2025-01-27

## 2025-01-27 RX ADMIN — NITROGLYCERIN 0.4 MG: 0.4 TABLET SUBLINGUAL at 13:32

## 2025-01-27 NOTE — TELEPHONE ENCOUNTER
Rx Refill Note  Requested Prescriptions     Pending Prescriptions Disp Refills    butalbital-acetaminophen-caffeine (FIORICET, ESGIC) -40 MG per tablet [Pharmacy Med Name: BUTALBITAL-ACETAMN-CAF -40 TB] 30 tablet      Sig: TAKE 1 TABLET BY MOUTH EVERY 4 HOURS AS NEEDED      Last office visit with prescribing clinician: 10/25/2024   Last telemedicine visit with prescribing clinician: Visit date not found   Next office visit with prescribing clinician: Visit date not found

## 2025-01-28 RX ORDER — BUTALBITAL, ACETAMINOPHEN AND CAFFEINE 50; 325; 40 MG/1; MG/1; MG/1
1 TABLET ORAL EVERY 4 HOURS PRN
Qty: 30 TABLET | Refills: 0 | Status: SHIPPED | OUTPATIENT
Start: 2025-01-28

## 2025-01-30 DIAGNOSIS — Z80.3 FAMILY HISTORY OF BREAST CANCER: Primary | ICD-10-CM

## 2025-02-04 LAB — BH CV TILT AGENT USED: NORMAL

## 2025-02-05 ENCOUNTER — TELEPHONE (OUTPATIENT)
Dept: FAMILY MEDICINE CLINIC | Facility: CLINIC | Age: 25
End: 2025-02-05
Payer: COMMERCIAL

## 2025-02-05 DIAGNOSIS — G90.A POTS (POSTURAL ORTHOSTATIC TACHYCARDIA SYNDROME): Primary | ICD-10-CM

## 2025-02-07 DIAGNOSIS — I10 ESSENTIAL HYPERTENSION: ICD-10-CM

## 2025-02-07 RX ORDER — LOSARTAN POTASSIUM 25 MG/1
25 TABLET ORAL DAILY
Qty: 90 TABLET | Refills: 1 | Status: SHIPPED | OUTPATIENT
Start: 2025-02-07

## 2025-02-07 RX ORDER — SERTRALINE HYDROCHLORIDE 100 MG/1
TABLET, FILM COATED ORAL
Qty: 135 TABLET | Refills: 1 | Status: SHIPPED | OUTPATIENT
Start: 2025-02-07

## 2025-02-13 RX ORDER — METHOCARBAMOL 500 MG/1
500 TABLET, FILM COATED ORAL 4 TIMES DAILY
Qty: 120 TABLET | Refills: 0 | Status: SHIPPED | OUTPATIENT
Start: 2025-02-13

## 2025-02-13 NOTE — TELEPHONE ENCOUNTER
Rx Refill Note  Requested Prescriptions     Pending Prescriptions Disp Refills    methocarbamol (ROBAXIN) 500 MG tablet [Pharmacy Med Name: METHOCARBAMOL 500 MG TABLET] 120 tablet 0     Sig: TAKE 1 TABLET BY MOUTH 4 TIMES A DAY      Last office visit with prescribing clinician: 10/25/2024   Last telemedicine visit with prescribing clinician: Visit date not found   Next office visit with prescribing clinician: Visit date not found        Emily Head MA  02/13/25, 09:43 CST

## 2025-02-14 ENCOUNTER — OFFICE VISIT (OUTPATIENT)
Dept: OTOLARYNGOLOGY | Facility: CLINIC | Age: 25
End: 2025-02-14
Payer: COMMERCIAL

## 2025-02-14 VITALS
WEIGHT: 250 LBS | HEIGHT: 64 IN | TEMPERATURE: 98.8 F | DIASTOLIC BLOOD PRESSURE: 79 MMHG | SYSTOLIC BLOOD PRESSURE: 122 MMHG | HEART RATE: 87 BPM | BODY MASS INDEX: 42.68 KG/M2

## 2025-02-14 DIAGNOSIS — M26.623 TMJ TENDERNESS, BILATERAL: ICD-10-CM

## 2025-02-14 DIAGNOSIS — H93.A9 PULSATILE TINNITUS: ICD-10-CM

## 2025-02-14 DIAGNOSIS — H74.42 GRANULATION POLYP OF LEFT MIDDLE EAR: ICD-10-CM

## 2025-02-14 DIAGNOSIS — Z96.22 S/P BILATERAL MYRINGOTOMY WITH TUBE PLACEMENT: Primary | ICD-10-CM

## 2025-02-14 DIAGNOSIS — H93.13 TINNITUS, BILATERAL: ICD-10-CM

## 2025-02-14 NOTE — PROGRESS NOTES
KRISTEN Kaplan  [facility]  [address], [address]  [place] [address]  Fax [phone]  Phone [phone]      Visit Type: FOLLOW UP   Chief Complaint   Patient presents with    Ear Problem     6 weeks Recheck ears. Right is still giving problems             HISTORY OBTAINED FROM: patient  HPI  Lucrecia Lloyd is a 24 y.o. female who presents for follow up evaluation, recheck ear complaints.     She reports she is still having tinnitus to right ear, no notable change. Describes as buzzing, occasional pulsation. She denies ear drainage or notable ear pain, pressure or fullness.   Ct scan obtained after last visit.   She believes tubes are causing issue. Has had tubes for several years now, interested in having removed.     Past Medical History:   Diagnosis Date    Abdominal pain     r/t gallbladder    Acid reflux     Asthma     Dental disease     HL (hearing loss) 2023    Migraines     Nausea     Nosebleed     Seizures     Sinusitis        Past Surgical History:   Procedure Laterality Date    CHOLECYSTECTOMY      CHOLECYSTECTOMY WITH INTRAOPERATIVE CHOLANGIOGRAM N/A 01/20/2017    Procedure: CHOLECYSTECTOMY, LAPAROSCOPIC;  Surgeon: Lo De La Torre MD;  Location: [facility];  Service:     TOOTH EXTRACTION      all top teeth removed    TYMPANOSTOMY TUBE PLACEMENT         Family History: Her family history includes Migraines in her mother; Stroke in her maternal grandfather and paternal grandfather.     Social History: She  reports that she has never smoked. She has never used smokeless tobacco. She reports that she does not drink alcohol and does not use drugs.    Home Medications:  EPINEPHrine, Fluticasone-Salmeterol, albuterol, albuterol sulfate HFA, butalbital-acetaminophen-caffeine, calcium carbonate, cloNIDine, doxycycline, lamoTRIgine, levoFLOXacin, losartan, methocarbamol, methylPREDNISolone, montelukast, ondansetron,  pantoprazole, predniSONE, prednisoLONE acetate, promethazine, sertraline, ubrogepant, and vitamin D3    Allergies:  She is allergic to hydrocodone-acetaminophen, amoxicillin, azithromycin, cefuroxime, and hydrocodone.       Vital Signs:   Temp:  [98.8 °F (37.1 °C)] 98.8 °F (37.1 °C)  Heart Rate:  [87] 87  BP: (122)/(79) 122/79  ENT Physical Exam  Constitutional  Appearance: patient appears well-developed, well-nourished and well-groomed,  Communication/Voice: communication appropriate for developmental age; vocal quality normal;  Head and Face  Appearance: head appears normal, face appears normal and face appears atraumatic;  Palpation: TMJ tender on the right; TMJ tender on the left;  Salivary: glands normal;  Ear  Hearing: intact;  Auricles: bilateral auricles normal;  External Mastoids: bilateral external mastoids normal;  Ear Canals: bilateral ear canals normal;  Tympanic Membranes: right tympanic membrane tympanostomy tube noted; tilted tube; left tympanic membrane abnormal; tympanostomy tube noted; tilted tube; Tympanic Membrane comments: Ear tubes present bilaterally, some difficulty seeing base of right tube due to placement in anterior sulcus but appears relatively normal.  Left tube is surrounded with some cerumen, granulation tissue appears to be mostly resolved   Nose  External Nose: nares patent bilaterally; external nose normal;  Internal Nose: nasal mucosa normal; bilateral inferior turbinates normal;  Oral Cavity/Oropharynx  Lips: normal;  Teeth: missing teeth (all) and dentures noted;  Gums: gingiva normal;  Tongue: normal;  Oral mucosa: normal;  Hard palate: normal;  Soft palate: normal;  Tonsils: normal;  Base of Tongue: normal;  Posterior pharyngeal wall: normal;  Neck  Neck: neck normal; neck palpation normal;  Respiratory  Inspection: breathing unlabored; normal breathing rate;  Cardiovascular  Inspection: extremities are warm and well perfused;  Neurovestibular  Mental Status: alert and  oriented;           Result Review       RESULTS REVIEW    I have reviewed the patients old records in the chart.   The following results/records were reviewed:            Assessment & Plan  S/p bilateral myringotomy with tube placement    Tinnitus, bilateral    Pulsatile tinnitus    Granulation polyp of left middle ear    TMJ tenderness, bilateral                Patient wishes to discuss tube removal as she feels this is causing the tinnitus in her right ear.  Will have her restart Ciprodex x 10 days to clear crusting and bring patient back on a day 1 Dr. Dawson's office to discuss tube removal.  I will have her continue conservative measures.  I again advised TMJ precautions and exercises Flonase and general water precautions.      Call with questions or concerns      Return for Recheck on day Dr. Dawson in office, discussed tube removal.        Electronically signed by KRISTEN Kaplan 02/14/25 11:53 AM CST.

## 2025-03-03 ENCOUNTER — TELEPHONE (OUTPATIENT)
Dept: OTOLARYNGOLOGY | Facility: CLINIC | Age: 25
End: 2025-03-03
Payer: COMMERCIAL

## 2025-03-05 ENCOUNTER — OFFICE VISIT (OUTPATIENT)
Dept: CARDIOLOGY | Facility: CLINIC | Age: 25
End: 2025-03-05
Payer: COMMERCIAL

## 2025-03-05 VITALS
WEIGHT: 247 LBS | BODY MASS INDEX: 42.17 KG/M2 | HEIGHT: 64 IN | HEART RATE: 96 BPM | SYSTOLIC BLOOD PRESSURE: 130 MMHG | DIASTOLIC BLOOD PRESSURE: 87 MMHG

## 2025-03-05 DIAGNOSIS — J45.41 MODERATE PERSISTENT ASTHMA WITH ACUTE EXACERBATION: ICD-10-CM

## 2025-03-05 DIAGNOSIS — G90.A POTS (POSTURAL ORTHOSTATIC TACHYCARDIA SYNDROME): Primary | ICD-10-CM

## 2025-03-05 DIAGNOSIS — R29.818 SUSPECTED SLEEP APNEA: ICD-10-CM

## 2025-03-05 DIAGNOSIS — R42 DIZZINESS AND GIDDINESS: ICD-10-CM

## 2025-03-05 DIAGNOSIS — R55 SYNCOPE AND COLLAPSE: ICD-10-CM

## 2025-03-05 DIAGNOSIS — I10 PRIMARY HYPERTENSION: Primary | ICD-10-CM

## 2025-03-05 RX ORDER — DOXYCYCLINE 100 MG/1
100 CAPSULE ORAL 2 TIMES DAILY
Qty: 20 CAPSULE | Refills: 0 | Status: SHIPPED | OUTPATIENT
Start: 2025-03-05

## 2025-03-05 RX ORDER — PREDNISONE 10 MG/1
TABLET ORAL
Qty: 1 EACH | Refills: 0 | Status: SHIPPED | OUTPATIENT
Start: 2025-03-05

## 2025-03-05 NOTE — PROGRESS NOTES
Lucrecia Lloyd  [id number]  2000  24 y.o.  female    Referring Provider: Yvette Salazar APRN    Reason for  Visit:  Initial visit       Subjective    Mild chronic exertional shortness of breath on exertion relieved with rest  No significant cough or wheezing    No palpitations  No associated chest pain  No significant pedal edema    No fever or chills  No significant expectoration    No hemoptysis  Recent syncope, woke up on floor  No preceding palpitations, no incontinence of urine or stools, no preceeding chest pain. No aura.  Numerous dizzy spells   No seizure like activity     Tolerating current medications well with no untoward side effects   Compliant with prescribed medication regimen. Tries to adhere to cardiac diet.     Positive tilt table test reviewed  History of non epileptic seizure      Has moderate snoring with daytime fatigue    On Losartan and PRN Clonidine for elevated BP     Has moderate snoring with daytime fatigue      History of present illness:  Lucrecia Lloyd is a 24 y.o. yo female with HTN  who presents today for   Chief Complaint   Patient presents with    Establish Care     Recent positive tilt table test    .    History  Past Medical History:   Diagnosis Date    Abdominal pain     r/t gallbladder    Acid reflux     Asthma     Dental disease     HL (hearing loss) 2023    Migraines     Nausea     Nosebleed     Seizures     Sinusitis    ,   Past Surgical History:   Procedure Laterality Date    CHOLECYSTECTOMY      CHOLECYSTECTOMY WITH INTRAOPERATIVE CHOLANGIOGRAM N/A 01/20/2017    Procedure: CHOLECYSTECTOMY, LAPAROSCOPIC;  Surgeon: Lo De La Torre MD;  Location: [facility] OR;  Service:     TOOTH EXTRACTION      all top teeth removed    TYMPANOSTOMY TUBE PLACEMENT     ,   Family History   Problem Relation Age of Onset    Migraines Mother     Stroke Maternal Grandfather     Stroke Paternal Grandfather    ,   Social History     Tobacco Use    Smoking status: Never    Smokeless  tobacco: Never   Vaping Use    Vaping status: Never Used   Substance Use Topics    Alcohol use: No    Drug use: No   ,     Medications  Current Outpatient Medications   Medication Sig Dispense Refill    albuterol (PROVENTIL) (2.5 MG/3ML) 0.083% nebulizer solution Take 2.5 mg by nebulization Every 4 (Four) Hours As Needed for Wheezing. 100 mL 12    albuterol sulfate  (90 Base) MCG/ACT inhaler Inhale 2 puffs As Needed for Wheezing. 18 g 3    butalbital-acetaminophen-caffeine (FIORICET, ESGIC) -40 MG per tablet TAKE 1 TABLET BY MOUTH EVERY 4 HOURS AS NEEDED 30 tablet 0    calcium carbonate (Calcium 600) 600 MG tablet Take 1 tablet by mouth Daily. 90 tablet 3    cloNIDine (Catapres) 0.1 MG tablet Take 1 tablet by mouth 3 (Three) Times a Day As Needed for High Blood Pressure (SBP > 170 and/or DBP > 90). 90 tablet 2    lamoTRIgine (LaMICtal) 25 MG tablet Take 4 tablets by mouth.      losartan (COZAAR) 25 MG tablet TAKE 1 TABLET BY MOUTH DAILY 90 tablet 1    methocarbamol (ROBAXIN) 500 MG tablet TAKE 1 TABLET BY MOUTH 4 TIMES A  tablet 0    montelukast (SINGULAIR) 10 MG tablet TAKE 1 TABLET BY MOUTH DAILY 90 tablet 1    ondansetron (ZOFRAN) 4 MG tablet Take 1 tablet by mouth Every 8 (Eight) Hours As Needed for Nausea or Vomiting. 9 tablet 1    pantoprazole (PROTONIX) 40 MG EC tablet Take 1 tablet by mouth.      promethazine (PHENERGAN) 25 MG tablet Take 1 tablet by mouth Every 6 (Six) Hours As Needed for Nausea or Vomiting. 10 tablet 2    sertraline (ZOLOFT) 100 MG tablet TAKE 1.5 TABLET BY MOUTH DAILY 135 tablet 1    ubrogepant (Ubrelvy) 100 MG tablet TAKE 1 TABLET BY MOUTH AT ONSET OF HEADACHE; MAY REPEAT 1 TABLET IN 2 HOURS IF NEEDED. MAX OF 2 TABLETS IN A 24 HOUR PERIOD 10 tablet 2    vitamin D3 (Vitamin D) 125 MCG (5000 UT) capsule capsule Take 1 capsule by mouth Daily. 90 capsule 3    doxycycline (VIBRAMYCIN) 100 MG capsule Take 1 capsule by mouth 2 (Two) Times a Day. (Patient not taking: Reported  "on 11/20/2024) 20 capsule 0    doxycycline (VIBRAMYCIN) 100 MG capsule Take 1 capsule by mouth 2 (Two) Times a Day. (Patient not taking: Reported on 3/5/2025) 20 capsule 0    EPINEPHrine (EPIPEN) 0.3 MG/0.3ML solution auto-injector injection  (Patient not taking: Reported on 3/5/2025)      Fluticasone-Salmeterol (ADVAIR/WIXELA) 250-50 MCG/ACT DISKUS Inhale 1 puff 2 (Two) Times a Day. (Patient not taking: Reported on 3/5/2025) 60 each 2    levoFLOXacin (Levaquin) 500 MG tablet Take 1 tablet by mouth Daily. (Patient not taking: Reported on 2/14/2025) 5 tablet 0    prednisoLONE acetate (Pred Forte) 1 % ophthalmic suspension Use 2-3 drops in AU TID (Patient not taking: Reported on 2/14/2025) 10 mL 3    predniSONE (DELTASONE) 10 MG (48) dose pack As directed (Patient not taking: Reported on 3/5/2025) 1 each 0     No current facility-administered medications for this visit.       Allergies:  Hydrocodone-acetaminophen, Amoxicillin, Azithromycin, Cefuroxime, and Hydrocodone    Review of Systems  Review of Systems   Constitutional: Negative.   HENT: Negative.     Eyes: Negative.    Cardiovascular:  Positive for chest pain, dyspnea on exertion and palpitations. Negative for claudication, cyanosis, irregular heartbeat, leg swelling, near-syncope, orthopnea, paroxysmal nocturnal dyspnea and syncope.   Respiratory: Negative.     Endocrine: Negative.    Hematologic/Lymphatic: Negative.    Skin: Negative.    Gastrointestinal:  Negative for anorexia.   Genitourinary: Negative.    Neurological: Negative.    Psychiatric/Behavioral: Negative.         Objective     Physical Exam:  /87   Pulse 96   Ht 162.6 cm (64.02\")   Wt 112 kg (247 lb)   BMI 42.38 kg/m²     Physical Exam  Constitutional:       Appearance: Normal appearance.   HENT:      Head: Normocephalic.   Eyes:      General: Lids are normal.   Neck:      Vascular: No carotid bruit.   Cardiovascular:      Rate and Rhythm: Regular rhythm.      Heart sounds: Normal heart " sounds, S1 normal and S2 normal.      No systolic murmur is present.   Pulmonary:      Effort: Pulmonary effort is normal.   Abdominal:      Palpations: Abdomen is soft.   Neurological:      Mental Status: She is alert.   Psychiatric:         Speech: Speech normal.         Behavior: Behavior normal.         Thought Content: Thought content normal.         Results Review:    Results for orders placed during the hospital encounter of 07/12/24    Adult Transthoracic Echo Complete W/ Cont if Necessary Per Protocol    Interpretation Summary    Left ventricular systolic function is normal. Left ventricular ejection fraction appears to be 56 - 60%.    Left ventricular diastolic function was normal.    Estimated right ventricular systolic pressure from tricuspid regurgitation is normal (<35 mmHg).    Normal size and function of the right ventricle.    No significant valvular pathology.    No previous studies available for comparison.        ____________________________________________________________________________________________________________________________________________  Health maintenance and recommendations       The patient is advised to reduce or avoid caffeine or other cardiac stimulants.   Minimize or avoid  NSAID-type medications      Monitor for any signs of bleeding including red or dark stools. Fall precautions.   Advised staying uptodate with immunizations per established standard guidelines.    Offered to give patient  a copy of my notes     Questions were encouraged, asked and answered to the patient's  understanding and satisfaction. Questions if any regarding current medications and side effects, need for refills and importance of compliance to medications stressed.    Reviewed available prior notes, consults, prior visits, laboratory findings, radiology and cardiology relevant reports. Updated chart as applicable. I have reviewed the patient's medical history in detail and updated the computerized  patient record as relevant.      Updated patient regarding any new or relevant abnormalities on review of records or any new findings on physical exam. Mentioned to patient about purpose of visit and desirable health short and long term goals and objectives.    Primary to monitor CBC CMP Lipid panel and TSH as applicable    ___________________________________________________________________________________________________________________________________________   Procedures    Assessment & Plan   Diagnoses and all orders for this visit:    1. Primary hypertension (Primary)    2. Dizziness and giddiness  -     Holter Monitor - 72 Hour Up To 15 Days; Future    3. Syncope and collapse    4. Suspected sleep apnea  -     Ambulatory Referral to Sleep Lab          Plan    Patient expressed understanding  Encouraged and answered all questions   Discussed with the patient and all questioned fully answered. She will call me if any problems arise.   Discussed results of prior testing with patient : echo and Tilt table test      Plan on stopping Losartan   Check BP and heart rates twice daily initially till blood pressures and heart rates under good control and then at least 3x / week,   If blood pressures continue to be well-controlled then can check week a month  at home and bring a recording for review next visit  If BP >130/85 or < 100/60 persistently over 3 reading 30 mins apart or if heart rates persistently above 100 bpm or less than 55 bpm call sooner for evaluation and advise     Orders Placed This Encounter   Procedures    Ambulatory Referral to Sleep Lab     Referral Priority:   Routine     Referral Type:   Diagnostic Medical     Referral Reason:   Specialty Services Required     Requested Specialty:   Sleep Medicine     Number of Visits Requested:   1    Holter Monitor - 72 Hour Up To 15 Days     Standing Status:   Future     Standing Expiration Date:   3/5/2026     Order Specific Question:   Reason for exam?      Answer:   Dizziness     Order Specific Question:   Reason for exam?     Answer:   Presyncope or Syncope     Order Specific Question:   Vendor:     Answer:   Other     Order Specific Question:   Duration (days):     Answer:   14     Order Specific Question:   Release to patient     Answer:   Routine Release [[id number]]      Compression stockings, increase fluids so that there is clear urine every 2 hours, and use back brace with abdominal binder.   Elevate head end of bed 6 inches while sleeping     May need work up for secondary hypertension once BP chart reviewed        Return in about 2 months (around 5/5/2025).

## 2025-03-10 ENCOUNTER — TELEPHONE (OUTPATIENT)
Dept: OTOLARYNGOLOGY | Facility: CLINIC | Age: 25
End: 2025-03-10

## 2025-03-10 NOTE — TELEPHONE ENCOUNTER
Caller: Lucrecia Lloyd    Relationship: Self    Best call back number:   Telephone Information:   Mobile [phone]      What was the call regarding: PT CALLED TO R/S SAME DAY APPT PRIOR TO APPT TIME DUE TO TRANSPORTATION.  R/S TO 04/02/25 AT 2:45 PM PER PT REQUEST.

## 2025-03-11 ENCOUNTER — PATIENT MESSAGE (OUTPATIENT)
Dept: NEUROLOGY | Age: 25
End: 2025-03-11

## 2025-03-17 ENCOUNTER — OFFICE VISIT (OUTPATIENT)
Dept: NEUROLOGY | Age: 25
End: 2025-03-17
Payer: COMMERCIAL

## 2025-03-17 VITALS
HEIGHT: 64 IN | OXYGEN SATURATION: 97 % | HEART RATE: 74 BPM | SYSTOLIC BLOOD PRESSURE: 122 MMHG | DIASTOLIC BLOOD PRESSURE: 74 MMHG | BODY MASS INDEX: 40.29 KG/M2 | WEIGHT: 236 LBS

## 2025-03-17 DIAGNOSIS — M54.2 NECK PAIN: ICD-10-CM

## 2025-03-17 DIAGNOSIS — R56.9 CONVULSIONS, UNSPECIFIED CONVULSION TYPE (HCC): Primary | ICD-10-CM

## 2025-03-17 DIAGNOSIS — R27.0 ATAXIA: ICD-10-CM

## 2025-03-17 DIAGNOSIS — R56.9 CONVULSIONS, UNSPECIFIED CONVULSION TYPE (HCC): ICD-10-CM

## 2025-03-17 LAB — VIT B12 SERPL-MCNC: 1049 PG/ML (ref 232–1245)

## 2025-03-17 PROCEDURE — 99214 OFFICE O/P EST MOD 30 MIN: CPT | Performed by: PSYCHIATRY & NEUROLOGY

## 2025-03-17 RX ORDER — CLONIDINE HYDROCHLORIDE 0.1 MG/1
0.1 TABLET ORAL 3 TIMES DAILY PRN
COMMUNITY
Start: 2024-11-13

## 2025-03-17 RX ORDER — PHENOL 1.4 %
600 AEROSOL, SPRAY (ML) MUCOUS MEMBRANE DAILY
COMMUNITY
Start: 2024-10-25

## 2025-03-17 RX ORDER — LAMOTRIGINE 100 MG/1
200 TABLET ORAL 2 TIMES DAILY
Qty: 120 TABLET | Refills: 11 | Status: SHIPPED | OUTPATIENT
Start: 2025-03-17

## 2025-03-17 RX ORDER — LOSARTAN POTASSIUM 25 MG/1
25 TABLET ORAL DAILY
COMMUNITY
Start: 2025-02-07

## 2025-03-17 RX ORDER — PREDNISONE 10 MG/1
10 TABLET ORAL 2 TIMES DAILY
COMMUNITY
Start: 2025-03-05 | End: 2025-03-17 | Stop reason: ALTCHOICE

## 2025-03-17 NOTE — PROGRESS NOTES
[facility] Office Note      Patient:   Autumn Burt  MR#:    [id number]  Account Number:                         YOB: 2000  Date of Evaluation:  3/17/2025  Time of Note:                          9:21 AM  Primary/Referring Physician:  Martine Olson MD   Consulting Physician:  Chance Stafford, DO    FOLLOW UP VISIT     Chief Complaint   Patient presents with    Follow-up     Sooner follow up per Dr Stafford     Seizures     Having more seizures las seizure was 3-       HISTORY OF PRESENT ILLNESS    Autumn Burt is a 24 y.o. year old female here for possible seizures, ataxia, speech difficulty.  Previously patient had a seizure like episode, patient reports a DAMIEN, eyes rolled back, unresponsive briefly, noted some facial numbness, extremity numbness, possible mild GTC activity noted, confusion after, speech difficulty after.   Also noting more balance difficulty, headaches, and intermittent speech difficulty.  No history of TBI, meningitis or encephalitis.  Having daily events.  No family history of seizures.  Prior MRI brain with nothing acute, pineal cyst noted, unchanged.  Some anxiety and depression noted but has not overtly worsened.  Prior video EEG consistent with non-epileptic events, started on Lamictal for mood stabilization.  Events waxing and waning, some worsening since last seen from an event frequency standpoint.  Noting more cognitive loss.       Past Medical History:   Diagnosis Date    Anxiety     Asthma     Gastritis        Past Surgical History:   Procedure Laterality Date    CHOLECYSTECTOMY  02/22/2017    UPPER GASTROINTESTINAL ENDOSCOPY  2017    [facility]- normal per pt's mom    UPPER GASTROINTESTINAL ENDOSCOPY N/A 12/13/2023    Dr Eid, (-) [place], no lesions to explains symptoms,       Family History   Problem Relation Age of Onset    Rheum Arthritis Mother     Depression Mother     Colon Cancer Maternal Grandmother 69    Dementia

## 2025-03-18 DIAGNOSIS — I10 ESSENTIAL HYPERTENSION: Primary | ICD-10-CM

## 2025-03-18 RX ORDER — LISINOPRIL 5 MG/1
5 TABLET ORAL DAILY
Qty: 30 TABLET | Refills: 2 | Status: SHIPPED | OUTPATIENT
Start: 2025-03-18

## 2025-03-18 NOTE — TELEPHONE ENCOUNTER
Patient called in and stated that her neurologist told her that losartan could be causing increased seizures. He recommended that her PCP change the bp medication. Will stop losratan and trial lisinopril 5 mg

## 2025-03-19 LAB — VIT B1 BLD-MCNC: 127 NMOL/L (ref 70–180)

## 2025-03-20 ENCOUNTER — HOSPITAL ENCOUNTER (OUTPATIENT)
Dept: MRI IMAGING | Age: 25
Discharge: HOME | End: 2025-03-20
Attending: PSYCHIATRY & NEUROLOGY
Payer: COMMERCIAL

## 2025-03-20 DIAGNOSIS — R56.9 CONVULSIONS, UNSPECIFIED CONVULSION TYPE (HCC): ICD-10-CM

## 2025-03-20 DIAGNOSIS — M54.2 NECK PAIN: ICD-10-CM

## 2025-03-20 DIAGNOSIS — R27.0 ATAXIA: ICD-10-CM

## 2025-03-20 PROCEDURE — A9577 INJ MULTIHANCE: HCPCS | Performed by: PSYCHIATRY & NEUROLOGY

## 2025-03-20 PROCEDURE — 6360000004 HC RX CONTRAST MEDICATION: Performed by: PSYCHIATRY & NEUROLOGY

## 2025-03-20 PROCEDURE — 70553 MRI BRAIN STEM W/O & W/DYE: CPT

## 2025-03-20 RX ADMIN — GADOBENATE DIMEGLUMINE 20 ML: 529 INJECTION, SOLUTION INTRAVENOUS at 10:02

## 2025-03-25 ENCOUNTER — PATIENT MESSAGE (OUTPATIENT)
Dept: NEUROLOGY | Age: 25
End: 2025-03-25

## 2025-03-27 DIAGNOSIS — G90.A POTS (POSTURAL ORTHOSTATIC TACHYCARDIA SYNDROME): Primary | ICD-10-CM

## 2025-03-27 RX ORDER — MECLIZINE HYDROCHLORIDE 25 MG/1
25 TABLET ORAL 3 TIMES DAILY PRN
Qty: 90 TABLET | Refills: 1 | Status: SHIPPED | OUTPATIENT
Start: 2025-03-27

## 2025-03-27 RX ORDER — METHYLPREDNISOLONE 4 MG/1
TABLET ORAL
Qty: 21 TABLET | Refills: 0 | Status: SHIPPED | OUTPATIENT
Start: 2025-03-27

## 2025-04-01 ENCOUNTER — RESULTS FOLLOW-UP (OUTPATIENT)
Dept: CARDIOLOGY | Facility: CLINIC | Age: 25
End: 2025-04-01
Payer: COMMERCIAL

## 2025-04-01 DIAGNOSIS — G90.A POTS (POSTURAL ORTHOSTATIC TACHYCARDIA SYNDROME): Primary | ICD-10-CM

## 2025-04-02 ENCOUNTER — OFFICE VISIT (OUTPATIENT)
Dept: OTOLARYNGOLOGY | Facility: CLINIC | Age: 25
End: 2025-04-02
Payer: COMMERCIAL

## 2025-04-02 VITALS
TEMPERATURE: 98 F | BODY MASS INDEX: 42.89 KG/M2 | WEIGHT: 250 LBS | DIASTOLIC BLOOD PRESSURE: 92 MMHG | SYSTOLIC BLOOD PRESSURE: 123 MMHG | HEART RATE: 95 BPM

## 2025-04-02 DIAGNOSIS — H93.A9 PULSATILE TINNITUS: ICD-10-CM

## 2025-04-02 DIAGNOSIS — Z96.22 S/P BILATERAL MYRINGOTOMY WITH TUBE PLACEMENT: Primary | ICD-10-CM

## 2025-04-02 DIAGNOSIS — M26.623 TMJ TENDERNESS, BILATERAL: ICD-10-CM

## 2025-04-02 DIAGNOSIS — H93.13 TINNITUS, BILATERAL: ICD-10-CM

## 2025-04-02 RX ORDER — CLONIDINE HYDROCHLORIDE 0.1 MG/1
1 TABLET ORAL 3 TIMES DAILY PRN
COMMUNITY
Start: 2024-11-13

## 2025-04-02 RX ORDER — LAMOTRIGINE 100 MG/1
2 TABLET ORAL 2 TIMES DAILY
COMMUNITY
Start: 2025-03-17

## 2025-04-02 NOTE — PROGRESS NOTES
KRISTEN Kaplan  W [facility]NT [facility]  [address], [address]  [facility] [address]  Fax [phone]  Phone [phone]      Visit Type: FOLLOW UP   Chief Complaint   Patient presents with    Follow-up           HISTORY OBTAINED FROM: patient  HPI  Lucrecia Lloyd is a 24 y.o. female who presents for evaluation, recheck of ear complaints.  He has had ear issues for years.  Localized ears bilaterally.  She admits ear pain and recurrent ear infections.She feels like she can not hear well. She denies ringing tinnitus but reports hearing her heart beat in her right ear over for months.  She denies ear drainage. She has had some vertigo in the past but not recently.  She is status post myringotomy and tube placement by Dr. Arango at [facility] in [place].    She denies notable change in symptoms since last visit. No improvement or worsening of symptoms either. She wants to discuss tube removal, feel that is the cause of her symptoms.     Past Medical History:   Diagnosis Date    Abdominal pain     r/t gallbladder    Acid reflux     Asthma     Dental disease     HL (hearing loss) 2023    Migraines     Nausea     Nosebleed     Seizures     Sinusitis        Past Surgical History:   Procedure Laterality Date    CHOLECYSTECTOMY      CHOLECYSTECTOMY WITH INTRAOPERATIVE CHOLANGIOGRAM N/A 01/20/2017    Procedure: CHOLECYSTECTOMY, LAPAROSCOPIC;  Surgeon: Lo De La Torre MD;  Location: [facility];  Service:     TOOTH EXTRACTION      all top teeth removed    TYMPANOSTOMY TUBE PLACEMENT         Family History: Her family history includes Migraines in her mother; Stroke in her maternal grandfather and paternal grandfather.     Social History: She  reports that she has never smoked. She has never used smokeless tobacco. She reports that she does not drink alcohol and does not use drugs.    Home Medications:  EPINEPHrine, Fluticasone-Salmeterol, albuterol,  albuterol sulfate HFA, butalbital-acetaminophen-caffeine, calcium carbonate, cloNIDine, doxycycline, lamoTRIgine, levoFLOXacin, lisinopril, meclizine, methocarbamol, methylPREDNISolone, montelukast, ondansetron, pantoprazole, predniSONE, prednisoLONE acetate, promethazine, sertraline, ubrogepant, and vitamin D3    Allergies:  She is allergic to hydrocodone-acetaminophen, amoxicillin, azithromycin, cefuroxime, and hydrocodone.       Vital Signs:   Temp:  [98 °F (36.7 °C)] 98 °F (36.7 °C)  Heart Rate:  [95] 95  BP: (123)/(92) 123/92  ENT Physical Exam  Constitutional  Appearance: patient appears well-developed, well-nourished and well-groomed,  Communication/Voice: communication appropriate for developmental age; vocal quality normal;  Head and Face  Appearance: head appears normal, face appears normal and face appears atraumatic;  Palpation: TMJ tender on the right; TMJ tender on the left;  Salivary: glands normal;  Ear  Hearing: intact;  Auricles: bilateral auricles normal;  External Mastoids: bilateral external mastoids normal;  Ear Canals: bilateral ear canals normal;  Tympanic Membranes: right tympanic membrane tympanostomy tube noted; tilted tube; left tympanic membrane abnormal; tympanostomy tube noted; tilted tube; Tympanic Membrane comments: Ear tubes present bilaterally, some difficulty seeing base of right tube due to placement in anterior sulcus but appears relatively normal.  Left tube is surrounded with some cerumen, granulation tissue appears resolved   Nose  External Nose: nares patent bilaterally; external nose normal;  Internal Nose: nasal mucosa normal; bilateral inferior turbinates normal;  Oral Cavity/Oropharynx  Lips: normal;  Teeth: missing teeth (all) and dentures noted;  Gums: gingiva normal;  Tongue: normal;  Oral mucosa: normal;  Hard palate: normal;  Soft palate: normal;  Tonsils: normal;  Base of Tongue: normal;  Posterior pharyngeal wall: normal;  Neck  Neck: neck normal; neck palpation  normal;  Respiratory  Inspection: breathing unlabored; normal breathing rate;  Cardiovascular  Inspection: extremities are warm and well perfused;  Neurovestibular  Mental Status: alert and oriented;           Result Review       RESULTS REVIEW    I have reviewed the patients old records in the chart.                 Assessment & Plan  S/p bilateral myringotomy with tube placement    Tinnitus, bilateral    Pulsatile tinnitus    TMJ tenderness, bilateral       Otoscopic exam today appears relatively normal, left tube intact, tilted with some mild crusting but no notable return of granulation.  Right tube appears intact, mildly tilted, some difficulty seeing base due to curvature of ear canal and placement in the anterior sulcus but appears relatively normal otherwise.    Case discussed with Dr. Dawson, will plan to bring patient back for attempted removal of tubes in office.  Patient is agreeable to this.  Will schedule patient for IOP with Dr. Dawson.  She is to call with any questions or concerns.    Call for ear problems, especially change of hearing, ear pain or dizziness.  For the best results, use nasal sprays every day. It may take a week to build up in the nasal lining and a few more weeks to improve the eustachian tube function.  Protect getting water in the ears. If needed, may use over the counter silicone plugs or a cotton ball coated with vasoline when bathing.  Use hairdryer on a cool setting after bathing.  Use hearing protection in loud noise situations.  Continue water precautions      Call with questions or concerns    Return for Recheck, Follow up with Dr. Dawson IOP tube removal.        Electronically signed by KRISTEN Kaplan 04/02/25 10:46 AM CDT.

## 2025-04-03 ENCOUNTER — TELEPHONE (OUTPATIENT)
Dept: OTOLARYNGOLOGY | Facility: CLINIC | Age: 25
End: 2025-04-03
Payer: COMMERCIAL

## 2025-04-03 NOTE — TELEPHONE ENCOUNTER
Caller: Lucrecia Lloyd    Relationship to patient: Self    Best call back number: [phone]    Chief complaint: NEEDS TO RESCHEDULE APPT    Type of visit: IN OFFICE PROCEDURE    Requested date: EARLIEST TIME SAME DAY     If rescheduling, when is the original appointment: 4/17/25  2:30P    Additional notes:OK TO LEAVE A VM

## 2025-04-25 DIAGNOSIS — G47.00 INSOMNIA, UNSPECIFIED TYPE: Primary | ICD-10-CM

## 2025-04-25 RX ORDER — QUETIAPINE FUMARATE 50 MG/1
50 TABLET, FILM COATED ORAL NIGHTLY
Qty: 30 TABLET | Refills: 2 | Status: SHIPPED | OUTPATIENT
Start: 2025-04-25

## 2025-05-06 ENCOUNTER — PATIENT MESSAGE (OUTPATIENT)
Dept: NEUROLOGY | Age: 25
End: 2025-05-06

## 2025-05-29 DIAGNOSIS — R11.2 NAUSEA AND VOMITING, UNSPECIFIED VOMITING TYPE: ICD-10-CM

## 2025-05-29 RX ORDER — PROMETHAZINE HYDROCHLORIDE 25 MG/1
TABLET ORAL
Qty: 10 TABLET | Refills: 2 | OUTPATIENT
Start: 2025-05-29

## 2025-05-29 NOTE — TELEPHONE ENCOUNTER
Relay    Pt should schedule an appointment     Rx Refill Note  Requested Prescriptions     Pending Prescriptions Disp Refills    promethazine (PHENERGAN) 25 MG tablet [Pharmacy Med Name: PROMETHAZINE 25 MG TABLET] 10 tablet 2     Sig: TAKE ONE TABLET BY MOUTH EVERY 6 HOURS AS NEEDED FOR NAUSEA AND/OR VOMITING      Last office visit with prescribing clinician: Office Visit with Yvette Salazar APRN (10/25/2024) 10/25/2024   Last telemedicine visit with prescribing clinician: Visit date not found   Next office visit with prescribing clinician: Visit date not found              Natalia Corrales RN  05/29/25, 08:45 CDT

## 2025-05-30 DIAGNOSIS — R55 SYNCOPE AND COLLAPSE: Primary | ICD-10-CM

## 2025-05-30 RX ORDER — BLOOD-GLUCOSE METER
1 KIT MISCELLANEOUS AS NEEDED
Qty: 1 EACH | Refills: 0 | Status: SHIPPED | OUTPATIENT
Start: 2025-05-30

## 2025-06-16 DIAGNOSIS — I10 ESSENTIAL HYPERTENSION: ICD-10-CM

## 2025-06-16 RX ORDER — LISINOPRIL 5 MG/1
5 TABLET ORAL DAILY
Qty: 90 TABLET | Refills: 0 | Status: SHIPPED | OUTPATIENT
Start: 2025-06-16

## 2025-06-25 ENCOUNTER — OFFICE VISIT (OUTPATIENT)
Dept: NEUROLOGY | Age: 25
End: 2025-06-25
Payer: COMMERCIAL

## 2025-06-25 VITALS
DIASTOLIC BLOOD PRESSURE: 74 MMHG | WEIGHT: 250 LBS | BODY MASS INDEX: 42.68 KG/M2 | HEART RATE: 90 BPM | SYSTOLIC BLOOD PRESSURE: 105 MMHG | HEIGHT: 64 IN | OXYGEN SATURATION: 98 %

## 2025-06-25 DIAGNOSIS — R25.8 NOCTURNAL LEG MOVEMENTS: ICD-10-CM

## 2025-06-25 DIAGNOSIS — G47.33 OBSTRUCTIVE SLEEP APNEA: Primary | ICD-10-CM

## 2025-06-25 DIAGNOSIS — R06.81 WITNESSED APNEIC SPELLS: ICD-10-CM

## 2025-06-25 DIAGNOSIS — G47.00 INSOMNIA, UNSPECIFIED TYPE: ICD-10-CM

## 2025-06-25 DIAGNOSIS — R06.83 SNORING: ICD-10-CM

## 2025-06-25 PROCEDURE — 99214 OFFICE O/P EST MOD 30 MIN: CPT | Performed by: PHYSICIAN ASSISTANT

## 2025-06-25 RX ORDER — LISINOPRIL 5 MG/1
5 TABLET ORAL DAILY
COMMUNITY
Start: 2025-06-16

## 2025-06-25 NOTE — PATIENT INSTRUCTIONS
takes several deep breaths to catch up on breathing. As the person awakens, he or she may move briefly, snort or snore, and take a deep breath. Less frequently, a person may awaken completely with a sensation of gasping, smothering, or choking.  If the person falls back to sleep quickly, he or she will not remember the event. Many people with sleep apnea are unaware of their abnormal breathing in sleep, and all patients underestimate how often their sleep is interrupted. Awakening from sleep causes sleep to be unrefreshing and causes fatigue and daytime sleepiness.    Anatomic causes of obstructive sleep apnea --  Most patients have YVETTE because of a small upper airway. As the bones of the face and skull develop, some people develop a small lower face, a small mouth, and a tongue that seems too large for the mouth. These features are genetically determined, which explains why YVETTE tends to cluster in families. Obesity is another major factor. Tonsil enlargement can be an important cause, especially in children.    SLEEP APNEA SYMPTOMS -- The main symptoms of YVETTE are loud snoring, fatigue, and daytime sleepiness. However, some people have no symptoms. For example, if the person does not have a bed partner, he or she may not be aware of the snoring. Fatigue and sleepiness have many causes and are often attributed to overwork and increasing age. As a result, a person may be slow to recognize that they have a problem. A bed partner or spouse often prompts the patient to seek medical care.  Other symptoms may include one or more of the following:  ?Restless sleep  ?Awakening with choking, gasping, or smothering  ?Morning headaches, dry mouth, or sore throat  ?Waking frequently to urinate  ?Awakening unrested, groggy  ?Low energy, difficulty concentrating, memory impairment    Risk factors -- Certain factors increase the risk of sleep apnea.  ?Increasing age - YVETTE occurs at all ages, but it is more common in middle and

## 2025-06-25 NOTE — PROGRESS NOTES
REVIEW OF SYSTEMS    Constitutional: []Fever []Sweats []Chills [] Recent Injury [x] Denies all unless marked  HEENT:[]Headache  [] Head Injury [] Hearing Loss  [] Sore Throat  [] Ear Ache [x] Denies all unless marked  Spine:  [] Neck pain  [] Back pain  [] Sciaticia  [x] Denies all unless marked  Cardiovascular:[]Heart Disease []Palpitations [] Chest Pain   [x] Denies all unless marked  Pulmonary: []Shortness of Breath []Cough   [x] Denies all unless marked  Psychiatric/Behavioral:[] Depression [] Anxiety [x] Denies all unless marked  Gastrointestinal: []Nausea  []Vomiting  []Abdominal Pain  []Constipation  []Diarrhea  [x] Denies all unless marked  Genitourinary:   [] Frequency  [] Urgency  [] Dysuria [] Incontinence  [x] Denies all unless marked  Extremities: []Pain  []Swelling  [x] Denies all unless marked  Musculoskeletal: [] Myalgias  [] Joint Pain  [] Arthritis [] Muscle Cramps [] Muscle Twitches  [x] Denies all unless marked  Sleep: [x]Insomnia[x]Snoring []Restless Legs  []Sleep Apnea  [x]Daytime Sleepiness  [x] Denies all unless marked  Skin:[] Rash [] Color Change [x] Denies all unless marked   Neurological:[]Visual Disturbance [] Memory Loss []Loss of Balance []Slurred Speech []Weakness []Seizures  [] Dizziness [x] Denies all unless marked     
driving or operating heavy machinery when drowsy and the use of respiratory suppressants.   - The following educational material has been included in this visit after visit summary for your review:  YVETTE/PAP guidelines/sleep studies/CPAP/trouble shooting tips-discussed with pt.  - Ashton score assessed: 7  - BMI calculated: 42.9  - Neck circumference measured: 15 inches; Mallampati: Type 4  - Home sleep study ordered through sleep lab  - Results to be interpreted by Dr. Brando Real    Final Assessment: High likelihood of sleep apnea based on clinical findings and physical examination. Home sleep study ordered to confirm diagnosis.    Disposition:  - Follow-Up: Await results of home sleep study and follow up with CPAP therapy if sleep apnea is confirmed.      Orders Placed This Encounter   Procedures    [facility]    Home Sleep Study     No orders of the defined types were placed in this encounter.      The patient indicates understanding of the above issues and agrees with the care plan.       I spent 30 minutes caring for Autumn Burt  on this date of service. This time includes time spent by me in the following activities:preparing for the visit, reviewing tests, performing a medically appropriate examination and/or evaluation , counseling and educating the patient/family/caregiver, ordering medications, tests, or procedures, documenting information in the medical record and care coordination     The patient (or guardian, if applicable) and other individuals in attendance with the patient were advised that Artificial Intelligence will be utilized during this visit to record, process the conversation to generate a clinical note, and support improvement of the AI technology. The patient (or guardian, if applicable) and other individuals in attendance at the appointment consented to the use of AI, including the recording.

## 2025-06-29 PROBLEM — R25.8 NOCTURNAL LEG MOVEMENTS: Status: ACTIVE | Noted: 2025-06-29

## 2025-06-29 PROBLEM — G47.00 INSOMNIA: Status: ACTIVE | Noted: 2025-06-29

## 2025-06-29 PROBLEM — R06.83 SNORING: Status: ACTIVE | Noted: 2025-06-29

## 2025-06-29 PROBLEM — R06.81 WITNESSED APNEIC SPELLS: Status: ACTIVE | Noted: 2025-06-29

## 2025-06-29 PROBLEM — G47.33 OBSTRUCTIVE SLEEP APNEA: Status: ACTIVE | Noted: 2025-06-29

## 2025-08-08 ENCOUNTER — HOSPITAL ENCOUNTER (OUTPATIENT)
Dept: SLEEP CENTER | Age: 25
Discharge: HOME | End: 2025-08-10
Payer: COMMERCIAL

## 2025-08-08 DIAGNOSIS — R25.8 NOCTURNAL LEG MOVEMENTS: ICD-10-CM

## 2025-08-08 DIAGNOSIS — R06.83 SNORING: ICD-10-CM

## 2025-08-08 DIAGNOSIS — G47.00 INSOMNIA, UNSPECIFIED TYPE: ICD-10-CM

## 2025-08-08 DIAGNOSIS — F32.A DEPRESSION, UNSPECIFIED DEPRESSION TYPE: Primary | ICD-10-CM

## 2025-08-08 DIAGNOSIS — R06.81 WITNESSED APNEIC SPELLS: ICD-10-CM

## 2025-08-08 DIAGNOSIS — G47.33 OBSTRUCTIVE SLEEP APNEA: ICD-10-CM

## 2025-08-08 PROCEDURE — G0399 HOME SLEEP TEST/TYPE 3 PORTA: HCPCS

## 2025-08-08 RX ORDER — SERTRALINE HYDROCHLORIDE 100 MG/1
150 TABLET, FILM COATED ORAL DAILY
Qty: 90 TABLET | Refills: 0 | Status: CANCELLED | OUTPATIENT
Start: 2025-08-08

## 2025-08-08 RX ORDER — SERTRALINE HYDROCHLORIDE 100 MG/1
150 TABLET, FILM COATED ORAL DAILY
Qty: 90 TABLET | Refills: 1 | Status: SHIPPED | OUTPATIENT
Start: 2025-08-08 | End: 2025-08-14

## 2025-08-14 ENCOUNTER — OFFICE VISIT (OUTPATIENT)
Dept: FAMILY MEDICINE CLINIC | Facility: CLINIC | Age: 25
End: 2025-08-14
Payer: COMMERCIAL

## 2025-08-14 VITALS
BODY MASS INDEX: 42 KG/M2 | SYSTOLIC BLOOD PRESSURE: 106 MMHG | OXYGEN SATURATION: 98 % | DIASTOLIC BLOOD PRESSURE: 70 MMHG | HEART RATE: 88 BPM | WEIGHT: 246 LBS | HEIGHT: 64 IN

## 2025-08-14 DIAGNOSIS — F44.9 CONVERSION DISORDER: ICD-10-CM

## 2025-08-14 DIAGNOSIS — F32.A DEPRESSION, UNSPECIFIED DEPRESSION TYPE: ICD-10-CM

## 2025-08-14 DIAGNOSIS — N63.12 MASS OF UPPER INNER QUADRANT OF RIGHT BREAST: Primary | ICD-10-CM

## 2025-08-14 DIAGNOSIS — F41.1 GENERALIZED ANXIETY DISORDER: ICD-10-CM

## 2025-08-14 DIAGNOSIS — R56.9 SEIZURE-LIKE ACTIVITY: ICD-10-CM

## 2025-08-14 PROCEDURE — 99214 OFFICE O/P EST MOD 30 MIN: CPT | Performed by: NURSE PRACTITIONER

## 2025-08-14 PROCEDURE — 96127 BRIEF EMOTIONAL/BEHAV ASSMT: CPT | Performed by: NURSE PRACTITIONER

## 2025-08-14 RX ORDER — LAMOTRIGINE 150 MG/1
300 TABLET ORAL 2 TIMES DAILY
Qty: 120 TABLET | Refills: 5 | Status: SHIPPED | OUTPATIENT
Start: 2025-08-14 | End: 2025-09-13

## 2025-08-14 RX ORDER — VILAZODONE HYDROCHLORIDE 20 MG/1
20 TABLET ORAL DAILY
Qty: 90 TABLET | Refills: 0 | Status: SHIPPED | OUTPATIENT
Start: 2025-08-14

## 2025-08-14 RX ORDER — ALPRAZOLAM 0.5 MG
0.5 TABLET ORAL 2 TIMES DAILY PRN
Qty: 30 TABLET | Refills: 0 | Status: SHIPPED | OUTPATIENT
Start: 2025-08-14

## 2025-08-18 DIAGNOSIS — R06.83 SNORING: ICD-10-CM

## 2025-08-18 DIAGNOSIS — R25.8 NOCTURNAL LEG MOVEMENTS: ICD-10-CM

## 2025-08-18 DIAGNOSIS — Z71.2 ENCOUNTER TO DISCUSS TEST RESULTS: ICD-10-CM

## 2025-08-18 DIAGNOSIS — R06.81 WITNESSED APNEIC SPELLS: ICD-10-CM

## 2025-08-18 DIAGNOSIS — G47.00 INSOMNIA, UNSPECIFIED TYPE: Primary | ICD-10-CM

## 2025-08-18 DIAGNOSIS — G47.00 INSOMNIA, UNSPECIFIED TYPE: ICD-10-CM

## 2025-08-18 DIAGNOSIS — R06.81 WITNESSED APNEIC SPELLS: Primary | ICD-10-CM

## 2025-08-20 ENCOUNTER — FOLLOWUP TELEPHONE ENCOUNTER (OUTPATIENT)
Dept: SLEEP CENTER | Age: 25
End: 2025-08-20

## (undated) DEVICE — ENDOPATH PNEUMONEEDLE INSUFFLATION NEEDLES WITH LUER LOCK CONNECTORS 120MM: Brand: ENDOPATH

## (undated) DEVICE — PK TURNOVER RM ADV

## (undated) DEVICE — ENDOPATH XCEL WITH OPTIVIEW TECHNOLOGY UNIVERSAL TROCAR STABILITY SLEEVES: Brand: ENDOPATH XCEL OPTIVIEW

## (undated) DEVICE — FORCEPS BX 240CM 2.4MM L NDL RAD JAW 4 M00513334

## (undated) DEVICE — CANNULA NSL AD L7FT DIV O2 CO2 W/ M LUERLOCK TRMPT CONN

## (undated) DEVICE — SINGLE PORT MANIFOLD: Brand: NEPTUNE 2

## (undated) DEVICE — BRUSH ENDOSCP 2 END CHN HEDGEHOG

## (undated) DEVICE — ANTIBACTERIAL UNDYED BRAIDED (POLYGLACTIN 910), SYNTHETIC ABSORBABLE SUTURE: Brand: COATED VICRYL

## (undated) DEVICE — ADAPTER CLEANING PORPOISE CLEANING

## (undated) DEVICE — 3M™ STERI-STRIP™ REINFORCED ADHESIVE SKIN CLOSURES, R1546, 1/4 IN X 4 IN (6 MM X 100 MM), 10 STRIPS/ENVELOPE: Brand: 3M™ STERI-STRIP™

## (undated) DEVICE — PRESSURE SYSTEM DISPOSABLE SUCTION/IRRIGATOR, DUAL SPIKE TUBING: Brand: STRYKEFLOW

## (undated) DEVICE — ENDOPATH XCEL WITH OPTIVIEW TECHNOLOGY DILATING TIP TROCARS WITH STABILITY SLEEVES: Brand: ENDOPATH XCEL OPTIVIEW

## (undated) DEVICE — GLV SURG BIOGEL M LTX PF 7 1/2

## (undated) DEVICE — SUT VIC 0 UR6 27IN VCP603H

## (undated) DEVICE — BITE BLOCK ENDOSCP AD 60 FR W/ ADJ STRP PLAS GRN BLOX

## (undated) DEVICE — ENDO KIT,LOURDES HOSPITAL: Brand: MEDLINE INDUSTRIES, INC.

## (undated) DEVICE — PAD LAP CHOLE: Brand: MEDLINE INDUSTRIES, INC.

## (undated) DEVICE — SPONGE ENDOSCP CLN BS INF PREVENTION KOALA

## (undated) DEVICE — PDS II VLT 0 107CM AG ST3: Brand: ENDOLOOP

## (undated) DEVICE — APPL CHLORAPREP W/TINT 26ML ORNG

## (undated) DEVICE — ENDOPATH XCEL DILATING TIP TROCARS WITH STABILITY SLEEVES: Brand: ENDOPATH XCEL

## (undated) DEVICE — LAPAROSCOPIC MONOPOLAR CORD: Brand: VALLEYLAB

## (undated) DEVICE — MINI ENDOCUT SCISSOR TIP, DISPOSABLE: Brand: RENEW